# Patient Record
Sex: MALE | Race: WHITE | Employment: OTHER | URBAN - METROPOLITAN AREA
[De-identification: names, ages, dates, MRNs, and addresses within clinical notes are randomized per-mention and may not be internally consistent; named-entity substitution may affect disease eponyms.]

---

## 2017-02-02 RX ORDER — SIMVASTATIN 40 MG
TABLET ORAL
Qty: 90 TABLET | Refills: 3 | Status: SHIPPED | OUTPATIENT
Start: 2017-02-02 | End: 2017-10-17 | Stop reason: ALTCHOICE

## 2017-02-02 RX ORDER — LISINOPRIL 5 MG/1
TABLET ORAL
Qty: 90 TABLET | Refills: 3 | Status: SHIPPED | OUTPATIENT
Start: 2017-02-02 | End: 2017-10-17 | Stop reason: DRUGHIGH

## 2017-02-06 ENCOUNTER — OFFICE VISIT (OUTPATIENT)
Dept: INTERNAL MEDICINE CLINIC | Age: 57
End: 2017-02-06

## 2017-02-06 VITALS
HEART RATE: 68 BPM | WEIGHT: 208 LBS | DIASTOLIC BLOOD PRESSURE: 66 MMHG | BODY MASS INDEX: 30.81 KG/M2 | SYSTOLIC BLOOD PRESSURE: 120 MMHG | HEIGHT: 69 IN | RESPIRATION RATE: 12 BRPM

## 2017-02-06 DIAGNOSIS — N52.9 ERECTILE DYSFUNCTION, UNSPECIFIED ERECTILE DYSFUNCTION TYPE: ICD-10-CM

## 2017-02-06 DIAGNOSIS — I10 BENIGN ESSENTIAL HTN: Primary | ICD-10-CM

## 2017-02-06 DIAGNOSIS — R73.01 IMPAIRED FASTING GLUCOSE: ICD-10-CM

## 2017-02-06 DIAGNOSIS — Z23 FLU VACCINE NEED: ICD-10-CM

## 2017-02-06 DIAGNOSIS — I87.2 VENOUS INSUFFICIENCY OF BOTH LOWER EXTREMITIES: ICD-10-CM

## 2017-02-06 DIAGNOSIS — E78.5 HYPERLIPIDEMIA, UNSPECIFIED HYPERLIPIDEMIA TYPE: ICD-10-CM

## 2017-02-06 LAB
A/G RATIO: 1.7 (ref 1.1–2.2)
ALBUMIN SERPL-MCNC: 4.6 G/DL (ref 3.4–5)
ALP BLD-CCNC: 55 U/L (ref 40–129)
ALT SERPL-CCNC: 18 U/L (ref 10–40)
ANION GAP SERPL CALCULATED.3IONS-SCNC: 13 MMOL/L (ref 3–16)
AST SERPL-CCNC: 16 U/L (ref 15–37)
BILIRUB SERPL-MCNC: <0.2 MG/DL (ref 0–1)
BUN BLDV-MCNC: 27 MG/DL (ref 7–20)
CALCIUM SERPL-MCNC: 9.4 MG/DL (ref 8.3–10.6)
CHLORIDE BLD-SCNC: 102 MMOL/L (ref 99–110)
CHOLESTEROL, TOTAL: 168 MG/DL (ref 0–199)
CO2: 27 MMOL/L (ref 21–32)
CREAT SERPL-MCNC: 1 MG/DL (ref 0.9–1.3)
GFR AFRICAN AMERICAN: >60
GFR NON-AFRICAN AMERICAN: >60
GLOBULIN: 2.7 G/DL
GLUCOSE BLD-MCNC: 76 MG/DL (ref 70–99)
HDLC SERPL-MCNC: 42 MG/DL (ref 40–60)
LDL CHOLESTEROL CALCULATED: ABNORMAL MG/DL
LDL CHOLESTEROL DIRECT: 94 MG/DL
POTASSIUM SERPL-SCNC: 4.6 MMOL/L (ref 3.5–5.1)
SODIUM BLD-SCNC: 142 MMOL/L (ref 136–145)
TOTAL PROTEIN: 7.3 G/DL (ref 6.4–8.2)
TRIGL SERPL-MCNC: 310 MG/DL (ref 0–150)
VLDLC SERPL CALC-MCNC: ABNORMAL MG/DL

## 2017-02-06 PROCEDURE — 99214 OFFICE O/P EST MOD 30 MIN: CPT | Performed by: INTERNAL MEDICINE

## 2017-02-06 PROCEDURE — 90686 IIV4 VACC NO PRSV 0.5 ML IM: CPT | Performed by: INTERNAL MEDICINE

## 2017-02-06 PROCEDURE — 90471 IMMUNIZATION ADMIN: CPT | Performed by: INTERNAL MEDICINE

## 2017-02-06 RX ORDER — TADALAFIL 20 MG/1
TABLET ORAL
Qty: 5 TABLET | Refills: 5 | Status: SHIPPED | OUTPATIENT
Start: 2017-02-06 | End: 2017-09-13 | Stop reason: SDUPTHER

## 2017-02-07 LAB
ESTIMATED AVERAGE GLUCOSE: 116.9 MG/DL
HBA1C MFR BLD: 5.7 %

## 2017-02-10 ENCOUNTER — TELEPHONE (OUTPATIENT)
Dept: INTERNAL MEDICINE CLINIC | Age: 57
End: 2017-02-10

## 2017-09-13 ENCOUNTER — OFFICE VISIT (OUTPATIENT)
Dept: INTERNAL MEDICINE CLINIC | Age: 57
End: 2017-09-13

## 2017-09-13 VITALS
BODY MASS INDEX: 29.2 KG/M2 | SYSTOLIC BLOOD PRESSURE: 129 MMHG | HEIGHT: 70 IN | DIASTOLIC BLOOD PRESSURE: 74 MMHG | WEIGHT: 204 LBS | RESPIRATION RATE: 12 BRPM | HEART RATE: 49 BPM

## 2017-09-13 DIAGNOSIS — Z12.5 SCREENING FOR PROSTATE CANCER: ICD-10-CM

## 2017-09-13 DIAGNOSIS — M79.672 PAIN OF LEFT HEEL: ICD-10-CM

## 2017-09-13 DIAGNOSIS — S90.32XA CONTUSION OF LEFT HEEL, INITIAL ENCOUNTER: ICD-10-CM

## 2017-09-13 DIAGNOSIS — Z23 FLU VACCINE NEED: ICD-10-CM

## 2017-09-13 DIAGNOSIS — N52.9 ERECTILE DYSFUNCTION, UNSPECIFIED ERECTILE DYSFUNCTION TYPE: ICD-10-CM

## 2017-09-13 DIAGNOSIS — I45.10 RBBB (RIGHT BUNDLE BRANCH BLOCK): ICD-10-CM

## 2017-09-13 DIAGNOSIS — Z00.00 ANNUAL PHYSICAL EXAM: Primary | ICD-10-CM

## 2017-09-13 DIAGNOSIS — R00.1 SINUS BRADYCARDIA: ICD-10-CM

## 2017-09-13 LAB
A/G RATIO: 1.6 (ref 1.1–2.2)
ALBUMIN SERPL-MCNC: 4.6 G/DL (ref 3.4–5)
ALP BLD-CCNC: 51 U/L (ref 40–129)
ALT SERPL-CCNC: 19 U/L (ref 10–40)
ANION GAP SERPL CALCULATED.3IONS-SCNC: 14 MMOL/L (ref 3–16)
AST SERPL-CCNC: 17 U/L (ref 15–37)
BASOPHILS ABSOLUTE: 0.1 K/UL (ref 0–0.2)
BASOPHILS RELATIVE PERCENT: 1.9 %
BILIRUB SERPL-MCNC: 0.4 MG/DL (ref 0–1)
BILIRUBIN, POC: NORMAL
BLOOD URINE, POC: NORMAL
BUN BLDV-MCNC: 27 MG/DL (ref 7–20)
CALCIUM SERPL-MCNC: 9.3 MG/DL (ref 8.3–10.6)
CHLORIDE BLD-SCNC: 100 MMOL/L (ref 99–110)
CHOLESTEROL, TOTAL: 188 MG/DL (ref 0–199)
CLARITY, POC: CLEAR
CO2: 26 MMOL/L (ref 21–32)
COLOR, POC: YELLOW
CREAT SERPL-MCNC: 0.9 MG/DL (ref 0.9–1.3)
EOSINOPHILS ABSOLUTE: 0.5 K/UL (ref 0–0.6)
EOSINOPHILS RELATIVE PERCENT: 10.5 %
GFR AFRICAN AMERICAN: >60
GFR NON-AFRICAN AMERICAN: >60
GLOBULIN: 2.9 G/DL
GLUCOSE BLD-MCNC: 96 MG/DL (ref 70–99)
GLUCOSE URINE, POC: NORMAL
HCT VFR BLD CALC: 44.2 % (ref 40.5–52.5)
HDLC SERPL-MCNC: 48 MG/DL (ref 40–60)
HEMOGLOBIN: 14.7 G/DL (ref 13.5–17.5)
KETONES, POC: NORMAL
LDL CHOLESTEROL CALCULATED: 113 MG/DL
LEUKOCYTE EST, POC: NORMAL
LYMPHOCYTES ABSOLUTE: 2.1 K/UL (ref 1–5.1)
LYMPHOCYTES RELATIVE PERCENT: 41.1 %
MCH RBC QN AUTO: 32.1 PG (ref 26–34)
MCHC RBC AUTO-ENTMCNC: 33.4 G/DL (ref 31–36)
MCV RBC AUTO: 96.2 FL (ref 80–100)
MONOCYTES ABSOLUTE: 0.4 K/UL (ref 0–1.3)
MONOCYTES RELATIVE PERCENT: 8.2 %
NEUTROPHILS ABSOLUTE: 2 K/UL (ref 1.7–7.7)
NEUTROPHILS RELATIVE PERCENT: 38.3 %
NITRITE, POC: NORMAL
PDW BLD-RTO: 12.6 % (ref 12.4–15.4)
PH, POC: 5
PLATELET # BLD: 260 K/UL (ref 135–450)
PMV BLD AUTO: 7.6 FL (ref 5–10.5)
POTASSIUM SERPL-SCNC: 5.1 MMOL/L (ref 3.5–5.1)
PROSTATE SPECIFIC ANTIGEN: 0.85 NG/ML (ref 0–4)
PROTEIN, POC: NORMAL
RBC # BLD: 4.59 M/UL (ref 4.2–5.9)
SODIUM BLD-SCNC: 140 MMOL/L (ref 136–145)
SPECIFIC GRAVITY, POC: 1.01
TOTAL PROTEIN: 7.5 G/DL (ref 6.4–8.2)
TRIGL SERPL-MCNC: 137 MG/DL (ref 0–150)
TSH SERPL DL<=0.05 MIU/L-ACNC: 2.07 UIU/ML (ref 0.27–4.2)
UROBILINOGEN, POC: 0.2
VLDLC SERPL CALC-MCNC: 27 MG/DL
WBC # BLD: 5.1 K/UL (ref 4–11)

## 2017-09-13 PROCEDURE — 99396 PREV VISIT EST AGE 40-64: CPT | Performed by: INTERNAL MEDICINE

## 2017-09-13 PROCEDURE — 81002 URINALYSIS NONAUTO W/O SCOPE: CPT | Performed by: INTERNAL MEDICINE

## 2017-09-13 PROCEDURE — 90471 IMMUNIZATION ADMIN: CPT | Performed by: INTERNAL MEDICINE

## 2017-09-13 PROCEDURE — 93000 ELECTROCARDIOGRAM COMPLETE: CPT | Performed by: INTERNAL MEDICINE

## 2017-09-13 PROCEDURE — 90686 IIV4 VACC NO PRSV 0.5 ML IM: CPT | Performed by: INTERNAL MEDICINE

## 2017-09-13 RX ORDER — TADALAFIL 20 MG/1
TABLET ORAL
Qty: 5 TABLET | Refills: 5 | Status: SHIPPED | OUTPATIENT
Start: 2017-09-13 | End: 2017-10-17

## 2017-09-13 RX ORDER — MELOXICAM 15 MG/1
15 TABLET ORAL
Qty: 30 TABLET | Refills: 0 | Status: SHIPPED | OUTPATIENT
Start: 2017-09-13 | End: 2017-10-17 | Stop reason: ALTCHOICE

## 2017-09-22 ENCOUNTER — HOSPITAL ENCOUNTER (OUTPATIENT)
Dept: OTHER | Age: 57
Discharge: HOME | End: 2017-09-22
Attending: INTERNAL MEDICINE | Admitting: INTERNAL MEDICINE

## 2017-09-22 DIAGNOSIS — M79.672 PAIN OF LEFT HEEL: ICD-10-CM

## 2017-09-22 DIAGNOSIS — S90.32XA CONTUSION OF LEFT HEEL, INITIAL ENCOUNTER: ICD-10-CM

## 2017-09-28 ENCOUNTER — TELEPHONE (OUTPATIENT)
Dept: INTERNAL MEDICINE CLINIC | Age: 57
End: 2017-09-28

## 2017-10-03 ENCOUNTER — OFFICE VISIT (OUTPATIENT)
Dept: INTERNAL MEDICINE CLINIC | Age: 57
End: 2017-10-03

## 2017-10-03 ENCOUNTER — HOSPITAL ENCOUNTER (OUTPATIENT)
Dept: CT IMAGING | Age: 57
Discharge: HOME | End: 2017-10-03
Attending: INTERNAL MEDICINE | Admitting: INTERNAL MEDICINE

## 2017-10-03 ENCOUNTER — TELEPHONE (OUTPATIENT)
Dept: INTERNAL MEDICINE CLINIC | Age: 57
End: 2017-10-03

## 2017-10-03 VITALS — DIASTOLIC BLOOD PRESSURE: 76 MMHG | SYSTOLIC BLOOD PRESSURE: 137 MMHG | HEART RATE: 62 BPM

## 2017-10-03 DIAGNOSIS — R51.9 HEADACHE: ICD-10-CM

## 2017-10-03 DIAGNOSIS — R51.9 NEW ONSET HEADACHE: ICD-10-CM

## 2017-10-03 DIAGNOSIS — R51.9 NEW ONSET HEADACHE: Primary | ICD-10-CM

## 2017-10-03 PROCEDURE — 99214 OFFICE O/P EST MOD 30 MIN: CPT | Performed by: INTERNAL MEDICINE

## 2017-10-03 NOTE — MR AVS SNAPSHOT
· Do not ignore new symptoms that occur with a headache, such as a fever, weakness or numbness, vision changes, or confusion. These may be signs of a more serious problem. To prevent headaches  · Keep a headache diary so you can figure out what triggers your headaches. Avoiding triggers may help you prevent headaches. Record when each headache began, how long it lasted, and what the pain was like (throbbing, aching, stabbing, or dull). Write down any other symptoms you had with the headache, such as nausea, flashing lights or dark spots, or sensitivity to bright light or loud noise. Note if the headache occurred near your period. List anything that might have triggered the headache, such as certain foods (chocolate, cheese, wine) or odors, smoke, bright light, stress, or lack of sleep. · Find healthy ways to deal with stress. Headaches are most common during or right after stressful times. Take time to relax before and after you do something that has caused a headache in the past.  · Try to keep your muscles relaxed by keeping good posture. Check your jaw, face, neck, and shoulder muscles for tension, and try relaxing them. When sitting at a desk, change positions often, and stretch for 30 seconds each hour. · Get plenty of sleep and exercise. · Eat regularly and well. Long periods without food can trigger a headache. · Treat yourself to a massage. Some people find that regular massages are very helpful in relieving tension. · Limit caffeine by not drinking too much coffee, tea, or soda. But don't quit caffeine suddenly, because that can also give you headaches. · Reduce eyestrain from computers by blinking frequently and looking away from the computer screen every so often. Make sure you have proper eyewear and that your monitor is set up properly, about an arm's length away. · Seek help if you have depression or anxiety.  Your headaches may be linked to these conditions. Treatment can both prevent headaches and help with symptoms of anxiety or depression. When should you call for help? Call 911 anytime you think you may need emergency care. For example, call if:  · You have signs of a stroke. These may include:  ¨ Sudden numbness, paralysis, or weakness in your face, arm, or leg, especially on only one side of your body. ¨ Sudden vision changes. ¨ Sudden trouble speaking. ¨ Sudden confusion or trouble understanding simple statements. ¨ Sudden problems with walking or balance. ¨ A sudden, severe headache that is different from past headaches. Call your doctor now or seek immediate medical care if:  · You have a new or worse headache. · Your headache gets much worse. Where can you learn more? Go to https://[employer] Lasertron[employer] Corporation.Qustodian. org and sign in to your Night Out account. Enter 1367 7488 in the EnCoate box to learn more about \"Headache: Care Instructions. \"     If you do not have an account, please click on the \"Sign Up Now\" link. Current as of: October 14, 2016  Content Version: 11.3  © 9382-6219 uGift. Care instructions adapted under license by [facility] ([facility]). If you have questions about a medical condition or this instruction, always ask your healthcare professional. [address] any warranty or liability for your use of this information.               Medications and Orders      Your Current Medications Are              tadalafil (CIALIS) 20 MG tablet Take 1/2 tab by mouth when necessary    meloxicam (MOBIC) 15 MG tablet Take 1 tablet by mouth daily (with breakfast)    lisinopril (PRINIVIL;ZESTRIL) 5 MG tablet TAKE 1 TABLET DAILY    simvastatin (ZOCOR) 40 MG tablet TAKE 1 TABLET EVERY EVENING    Cetirizine HCl (ZYRTEC ALLERGY PO)   Take 10 mg by mouth daily       Allergies              Amoxicillin Nausea Only    Codeine Nausea Only      We Ordered/Performed the following

## 2017-10-03 NOTE — PROGRESS NOTES
[facility]) Physicians  Internal Medicine  Patient Encounter  Leon Richard D.O., [facility]        Chief Complaint   Patient presents with    Headache       HPI: 60 y.o. male Seen today with complaint of headache. Symptoms started while on his boat 2 days ago in [place]. He had been on the boat for 4-5 hours fishing. Pt reports developing an acute severe headache. He described a sharp pain that was unbearable. He reports this was the worst HA of his life. Given his family history of cerebral aneurysm (Father  of ruptured cerebral aneurysm) he was nervous. He felt like his Samba.melupis Lob was going to explode. \"  He thought he was going to dye. He then developed a feeling like he was going to pass out. His arms were tingling. The sun light made his HA worse. His friend drove boat in to the port. He had a Coca Cola. He then started vomiting. He still had the HA. He went back to his hotel and took Tylenol. The HA eased, and continued to be dull. He almost went to the ER. He continues to have a dull HA today that worsens with coughing or bending forward. He had a CTA of his head 2015 which showed no evidence of cerebral aneurysm. He also has had an MRA in the remote past-- no aneurysms. He denies H/O Migraines. He had been drinking the day before. He did not have vertigo. No syncope. He denies fever or chills. His friend was with him the whole time. Past Medical History:   Diagnosis Date    Carotid atherosclerosis     Colon polyps     Dilated aortic root (HCC)     HTN (hypertension)     Hyperlipidemia     Impaired fasting glucose     Mitral regurgitation     Psoriasis     RBBB (right bundle branch block)     Varicose veins     Venous insufficiency     Venous reflux     Saphenofemoral       Review of Systems - As per HPI  GEN: Pt denies fever, chills or night sweats. Denies weight changes.   Appetite good  HEENT: Pt denies visual and auditory changes, epistaxis, upper respiratory symptoms and dysphagia  CV: Pt denies CP, SOB, HUERTAS, orthopnea palpitations, LE swelling, and claudication. PULM: Pt denies cough, wheezing or sputum production  GI: Pt denies N/V/D, heart burn, rectal bleeding, or melena. NEURO: Pt denies unilateral weakness, paresthesia and dizziness. OBJECTIVE:  /76  Pulse 62  GEN: NAD, A&O, Non-toxic  HEENT: NC/AT, KRIS, EOMI, Oral cavity Clear,  TM's NL without hemotympanum, Nasal cavity clear without discharge. Funduscopic exam difficult but no obvious abnormalities particularly papilledema. NECK: Supple. No thyromegaly. No nuchal regidity  LYMPH: No C/SC nodes. CV: S1 S2 NL, RRR. No murmurs, clicks or rubs. PULM: CTA, symmentric air exchange  EXT: No C/C/E  GI: Abdomen is soft, NT, BS+, No hepatomegaly. NEURO: No focal or lateralizing deficits. No meningismus. (-) Kernig's, (-) Brudzinski's. No focal deficits. VASC:  No carotid bruits. Pulses symmetric    ASSESSMENT[de-identified]  Fernando Uribe was seen today for headache. Diagnoses and all orders for this visit:    New onset headache  -     CT Head WO Contrast      R/O Bleed  Differential considerations-- Migraine, dehydration, hypotension, HTN    Additional Plan:  1. Fluids  2. Tylenol or Advil for headache        Discussed medications with patient who voiced understanding of their use, indication and potential side effects. Pt also understands the above recommendations. All questions answered.

## 2017-10-03 NOTE — PATIENT INSTRUCTIONS
triggers your headaches. Avoiding triggers may help you prevent headaches. Record when each headache began, how long it lasted, and what the pain was like (throbbing, aching, stabbing, or dull). Write down any other symptoms you had with the headache, such as nausea, flashing lights or dark spots, or sensitivity to bright light or loud noise. Note if the headache occurred near your period. List anything that might have triggered the headache, such as certain foods (chocolate, cheese, wine) or odors, smoke, bright light, stress, or lack of sleep. · Find healthy ways to deal with stress. Headaches are most common during or right after stressful times. Take time to relax before and after you do something that has caused a headache in the past.  · Try to keep your muscles relaxed by keeping good posture. Check your jaw, face, neck, and shoulder muscles for tension, and try relaxing them. When sitting at a desk, change positions often, and stretch for 30 seconds each hour. · Get plenty of sleep and exercise. · Eat regularly and well. Long periods without food can trigger a headache. · Treat yourself to a massage. Some people find that regular massages are very helpful in relieving tension. · Limit caffeine by not drinking too much coffee, tea, or soda. But don't quit caffeine suddenly, because that can also give you headaches. · Reduce eyestrain from computers by blinking frequently and looking away from the computer screen every so often. Make sure you have proper eyewear and that your monitor is set up properly, about an arm's length away. · Seek help if you have depression or anxiety. Your headaches may be linked to these conditions. Treatment can both prevent headaches and help with symptoms of anxiety or depression. When should you call for help? Call 911 anytime you think you may need emergency care. For example, call if:  · You have signs of a stroke.  These may include:  ¨ Sudden numbness, paralysis, or weakness in your face, arm, or leg, especially on only one side of your body. ¨ Sudden vision changes. ¨ Sudden trouble speaking. ¨ Sudden confusion or trouble understanding simple statements. ¨ Sudden problems with walking or balance. ¨ A sudden, severe headache that is different from past headaches. Call your doctor now or seek immediate medical care if:  · You have a new or worse headache. · Your headache gets much worse. Where can you learn more? Go to https://Authentic8peVenuCare Medical.Resonant Sensors Inc.. org and sign in to your Trigemina account. Enter [id number] [id number] in the Ayrstone Productivity box to learn more about \"Headache: Care Instructions. \"     If you do not have an account, please click on the \"Sign Up Now\" link. Current as of: October 14, 2016  Content Version: 11.3  © 2382-4103 HealthRally, Incorporated. Care instructions adapted under license by [facility] ([facility]). If you have questions about a medical condition or this instruction, always ask your healthcare professional. [place] [address] any warranty or liability for your use of this information.

## 2017-10-12 ENCOUNTER — TELEPHONE (OUTPATIENT)
Dept: INTERNAL MEDICINE CLINIC | Age: 57
End: 2017-10-12

## 2017-10-12 NOTE — TELEPHONE ENCOUNTER
Suhail Ortez is pt's girfriend,she stated that pt was recently discharged from hospital for brain bleed. She stated that last night pt was not exerting himself but his b/p was up to 190/111 and he was complaining of headaches. She stated that she spoke to doctor on call  Dr Nina Voss. She advised pt to take extra Lisinopril 5 mg ,pt had taken his regular dose of 10 mg. She took his b/p this mrning about 10 mins ago and his b/p was 146/78. She did speak to the Neurologist and he told her to continue monitoring pt's b/p ot bring pt to ER for further evaluation. She wants to bring pt here for a appt. She is aware that Dr Shanika Guidry is out of the office. Please advise.

## 2017-10-12 NOTE — TELEPHONE ENCOUNTER
Ok to schedule follow up next week with Dr Brittany Parra for BP monitoring ,or if another MD appt available and family prefers can be seen tomorrow. . If pt is having recurrent HA, should go to ER.

## 2017-10-16 ENCOUNTER — TELEPHONE (OUTPATIENT)
Dept: INTERNAL MEDICINE CLINIC | Age: 57
End: 2017-10-16

## 2017-10-16 NOTE — TELEPHONE ENCOUNTER
He never went to ER when he was told to do so. If he is doing OK now, he can wait until visit. Go to ER if symptoms return.

## 2017-10-16 NOTE — TELEPHONE ENCOUNTER
Per pt: Saturday and Sunday pt bent over x2 and upon standing her felt a tingle and numbness in his left arm for about 5 minuets. He states that each episode left him feeling weak for about 2 hrs. Pt checked BP and his average is 148/82. PC to Dr Colton Mehta last week when pt bp went to 190 and pt was advised to take an extra 5mg Lisinopril. Pt dosage is 10mg Lisinopril per hospital  Pt denies any head pain or headache today.  He is scheduled for an office visit tomorrow

## 2017-10-17 ENCOUNTER — OFFICE VISIT (OUTPATIENT)
Dept: INTERNAL MEDICINE CLINIC | Age: 57
End: 2017-10-17

## 2017-10-17 ENCOUNTER — TELEPHONE (OUTPATIENT)
Dept: INTERNAL MEDICINE CLINIC | Age: 57
End: 2017-10-17

## 2017-10-17 VITALS
BODY MASS INDEX: 27.75 KG/M2 | WEIGHT: 199 LBS | DIASTOLIC BLOOD PRESSURE: 70 MMHG | SYSTOLIC BLOOD PRESSURE: 120 MMHG | HEART RATE: 66 BPM

## 2017-10-17 DIAGNOSIS — I10 BENIGN ESSENTIAL HTN: ICD-10-CM

## 2017-10-17 DIAGNOSIS — I60.9 SAH (SUBARACHNOID HEMORRHAGE) (HCC): Primary | ICD-10-CM

## 2017-10-17 DIAGNOSIS — F41.9 ANXIETY: ICD-10-CM

## 2017-10-17 DIAGNOSIS — R45.87 POOR IMPULSE CONTROL: ICD-10-CM

## 2017-10-17 DIAGNOSIS — E78.5 HYPERLIPIDEMIA, UNSPECIFIED HYPERLIPIDEMIA TYPE: ICD-10-CM

## 2017-10-17 PROCEDURE — 99214 OFFICE O/P EST MOD 30 MIN: CPT | Performed by: INTERNAL MEDICINE

## 2017-10-17 RX ORDER — ATORVASTATIN CALCIUM 20 MG/1
20 TABLET, FILM COATED ORAL DAILY
Qty: 30 TABLET | Refills: 0 | Status: SHIPPED | OUTPATIENT
Start: 2017-10-17 | End: 2018-09-24 | Stop reason: SDUPTHER

## 2017-10-17 RX ORDER — LISINOPRIL 10 MG/1
10 TABLET ORAL DAILY
Qty: 90 TABLET | Refills: 3 | Status: SHIPPED | OUTPATIENT
Start: 2017-10-17 | End: 2018-09-24 | Stop reason: SDUPTHER

## 2017-10-17 RX ORDER — LISINOPRIL 10 MG/1
10 TABLET ORAL DAILY
COMMUNITY
End: 2017-10-17 | Stop reason: SDUPTHER

## 2017-10-17 RX ORDER — ATORVASTATIN CALCIUM 20 MG/1
20 TABLET, FILM COATED ORAL DAILY
Qty: 90 TABLET | Refills: 3 | Status: SHIPPED | OUTPATIENT
Start: 2017-10-17 | End: 2017-10-17 | Stop reason: SDUPTHER

## 2017-10-17 NOTE — PATIENT INSTRUCTIONS
group for people with similar problems. Talking to others sometimes relieves stress. · Get involved in social groups, or volunteer to help others. Being alone sometimes makes things seem worse than they are. · Get at least 30 minutes of exercise on most days of the week to relieve stress. Walking is a good choice. You also may want to do other activities, such as running, swimming, cycling, or playing tennis or team sports. Relaxation techniques  Do relaxation exercises 10 to 20 minutes a day. You can play soothing, relaxing music while you do them, if you wish. · Tell others in your house that you are going to do your relaxation exercises. Ask them not to disturb you. · Find a comfortable place, away from all distractions and noise. · Lie down on your back, or sit with your back straight. · Focus on your breathing. Make it slow and steady. · Breathe in through your nose. Breathe out through either your nose or mouth. · Breathe deeply, filling up the area between your navel and your rib cage. Breathe so that your belly goes up and down. · Do not hold your breath. · Breathe like this for 5 to 10 minutes. Notice the feeling of calmness throughout your whole body. As you continue to breathe slowly and deeply, relax by doing the following for another 5 to 10 minutes:  · Tighten and relax each muscle group in your body. You can begin at your toes and work your way up to your head. · Imagine your muscle groups relaxing and becoming heavy. · Empty your mind of all thoughts. · Let yourself relax more and more deeply. · Become aware of the state of calmness that surrounds you. · When your relaxation time is over, you can bring yourself back to alertness by moving your fingers and toes and then your hands and feet and then stretching and moving your entire body. Sometimes people fall asleep during relaxation, but they usually wake up shortly afterward.   · Always give yourself time to return to full alertness before you drive a car or do anything that might cause an accident if you are not fully alert. Never play a relaxation tape while you drive a car. When should you call for help? Call 911 anytime you think you may need emergency care. For example, call if:  · You feel you cannot stop from hurting yourself or someone else. Keep the numbers for these national suicide hotlines: 8-290-692-TALK (1-934.304.9729) and 3-446-XRFKHMD (0-686.629.3426). If you or someone you know talks about suicide or feeling hopeless, get help right away. Watch closely for changes in your health, and be sure to contact your doctor if:  · You have anxiety or fear that affects your life. · You have symptoms of anxiety that are new or different from those you had before. Where can you learn more? Go to https://App[employer] Ltd.Flipora. org and sign in to your J[employer]J Solutions account. Enter P754 in the BrainLAB box to learn more about \"Anxiety Disorder: Care Instructions. \"     If you do not have an account, please click on the \"Sign Up Now\" link. Current as of: July 26, 2016  Content Version: 11.3  © 3914-4866 From The Bench. Care instructions adapted under license by [facility] [address] If you have questions about a medical condition or this instruction, always ask your healthcare professional. [place] [address] any warranty or liability for your use of this information. Patient Education        Learning About Anxiety Disorders  What are anxiety disorders? Anxiety disorders are a type of medical problem. They cause severe anxiety. When you feel anxious, you feel that something bad is about to happen. This feeling interferes with your life. These disorders include:  · Generalized anxiety disorder. You feel worried and stressed about many everyday events and activities. This goes on for several months and disrupts your life on most days. · Panic disorder. You have repeated panic attacks.  A panic be used. Medicines may include:  · Antidepressants. These may help your symptoms by keeping chemicals in your brain in balance. · Benzodiazepines. These may give you short-term relief of your symptoms. Some people use cognitive-behavioral therapy. A therapist helps you learn to change stressful or bad thoughts into helpful thoughts. Lead a healthy lifestyle  A healthy lifestyle may help you feel better. · Get at least 30 minutes of exercise on most days of the week. Walking is a good choice. · Eat a healthy diet. Include fruits, vegetables, lean proteins, and whole grains in your diet each day. · Try to go to bed at the same time every night. Try for 8 hours of sleep a night. · Find ways to manage stress. Try relaxation exercises. · Avoid alcohol and illegal drugs. Follow-up care is a key part of your treatment and safety. Be sure to make and go to all appointments, and call your doctor if you are having problems. It's also a good idea to know your test results and keep a list of the medicines you take. Where can you learn more? Go to https://RUNform.BitPoster. org and sign in to your MOAEC account. Enter I097 in the [facility] box to learn more about \"Learning About Anxiety Disorders. \"     If you do not have an account, please click on the \"Sign Up Now\" link. Current as of: May 3, 2017  Content Version: 11.3  © 9732-2020 CreationFlow, Incorporated. Care instructions adapted under license by [facility] ([facility]). If you have questions about a medical condition or this instruction, always ask your healthcare professional. [place] [address] any warranty or liability for your use of this information. Patient Education        Learning About High Cholesterol  What is high cholesterol? Cholesterol is a type of fat in your blood. It is needed for many body functions, such as making new cells. Cholesterol is made by your body. It also comes from food you eat.   If you have too much cholesterol, it starts to build up in your arteries. This is called hardening of the arteries, or atherosclerosis. High cholesterol raises your risk of a heart attack and stroke. There are different types of cholesterol. LDL is the \"bad\" cholesterol. High LDL can raise your risk for heart disease, heart attack, and stroke. HDL is the \"good\" cholesterol. High HDL is linked with a lower risk for heart disease, heart attack, and stroke. Your cholesterol levels help your doctor find out your risk for having a heart attack or stroke. How can you prevent high cholesterol? A heart-healthy lifestyle can help you prevent high cholesterol. This lifestyle helps lower your risk for a heart attack and stroke. · Eat heart-healthy foods. ¨ Eat fruits, vegetables, whole grains (like oatmeal), dried beans and peas, nuts and seeds, soy products (like tofu), and fat-free or low-fat dairy products. ¨ Replace butter, margarine, and hydrogenated or partially hydrogenated oils with olive and canola oils. (Canola oil margarine without trans fat is fine.)  ¨ Replace red meat with fish, poultry, and soy protein (like tofu). ¨ Limit processed and packaged foods like chips, crackers, and cookies. · Be active. Exercise can improve your cholesterol level. Get at least 30 minutes of exercise on most days of the week. Walking is a good choice. You also may want to do other activities, such as running, swimming, cycling, or playing tennis or team sports. · Stay at a healthy weight. Lose weight if you need to. · Don't smoke. If you need help quitting, talk to your doctor about stop-smoking programs and medicines. These can increase your chances of quitting for good. How is high cholesterol treated? The goal of treatment is to reduce your chances of having a heart attack or stroke. The goal is not to lower your cholesterol numbers only.   · You may make lifestyle changes, such as eating healthy foods, not smoking, losing weight, and being more active. · You may have to take medicine. Follow-up care is a key part of your treatment and safety. Be sure to make and go to all appointments, and call your doctor if you are having problems. It's also a good idea to know your test results and keep a list of the medicines you take. Where can you learn more? Go to https://chpepiceweb.Yagomart. org and sign in to your [employer] account. Enter A608 in the Malwa International box to learn more about \"Learning About High Cholesterol. \"     If you do not have an account, please click on the \"Sign Up Now\" link. Current as of: April 3, 2017  Content Version: 11.3  © 9173-2727 VoiceGem, A[employer]ent Group. Care instructions adapted under license by [facility] ([facility]). If you have questions about a medical condition or this instruction, always ask your healthcare professional. [address] any warranty or liability for your use of this information. Patient Education        Learning About High Blood Pressure  What is high blood pressure? Blood pressure is a measure of how hard the blood pushes against the walls of your arteries. It's normal for blood pressure to go up and down throughout the day, but if it stays up, you have high blood pressure. Another name for high blood pressure is hypertension. Two numbers tell you your blood pressure. The first number is the systolic pressure. It shows how hard the blood pushes when your heart is pumping. The second number is the diastolic pressure. It shows how hard the blood pushes between heartbeats, when your heart is relaxed and filling with blood. A blood pressure of less than 120/80 (say \"120 over 80\") is ideal for an adult. High blood pressure is 140/90 or higher. You have high blood pressure if your top number is 140 or higher or your bottom number is 90 or higher, or both. Many people fall into the category in between, called prehypertension.  People with prehypertension need
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
## 2017-10-18 ENCOUNTER — OFFICE VISIT (OUTPATIENT)
Dept: PSYCHOLOGY | Age: 57
End: 2017-10-18

## 2017-10-18 DIAGNOSIS — F63.81 INTERMITTENT EXPLOSIVE DISORDER IN ADULT: Primary | ICD-10-CM

## 2017-10-18 PROCEDURE — 90791 PSYCH DIAGNOSTIC EVALUATION: CPT | Performed by: PSYCHOLOGIST

## 2017-10-18 ASSESSMENT — PATIENT HEALTH QUESTIONNAIRE - PHQ9
1. LITTLE INTEREST OR PLEASURE IN DOING THINGS: 0
SUM OF ALL RESPONSES TO PHQ QUESTIONS 1-9: 0
2. FEELING DOWN, DEPRESSED OR HOPELESS: 0
SUM OF ALL RESPONSES TO PHQ9 QUESTIONS 1 & 2: 0

## 2017-10-18 NOTE — PATIENT INSTRUCTIONS
1. When driving, work to see a different explanation of why someone did something that isn't personal (the odds the other  wasn't targeting you)  2. Challenge the belief that there are others ways to not be afraid that don't require violence/aggression  3. Work to find softer ways to defend yourself  4. [place] with new ways to correct the wrongs you see others do  5. Use the word \"Norma\" to remind you to pause and try a new approach when you get angry (let Pat Farah use this word to help you too)  6. To calm your body: diaphragmatic breathing (shorter inhale, longer exhale)  7. Keep going fishing as much as you can  8. Return in 2-3 weeks          Relaxation:  Diaphragmatic Breathing             ______________________________________________________________________________    1. Sit in a comfortable position    2. Place one hand on your stomach and the other on your chest    3. Try to breathe so that only your stomach rises and falls    As you inhale, concentrate on your chest remaining relatively still while your stomach rises. It may be helpful for you to imagine that your pants are too big and you need to push your stomach out to hold them up. When exhaling, allow your stomach to fall in and the air to fully escape. Inhale slowly. You may choose to hold the air in for about a second. Exhale slowly. Dont push the air out, but just let the natural pressure of your body slowly move it out. It is normal for this healthy method of breathing to feel a little awkward at first.  With practice, it will feel more natural.    4. Get your mind on your side    One other important factor in getting relaxed is your mind. Your mind and body are connected. The mind influences the body and the body influences the mind. What you do with your mind when you are trying to relax is very important. The key is to avoid thinking about stressful things.       You can think about      Neutral things (e.g., counting, saying a word like calm or relax)   Pleasant things (e.g., imagining a pleasant place)    5. It is recommended that you practice 2 times per day, 15 minutes each time.

## 2017-10-27 ENCOUNTER — TELEPHONE (OUTPATIENT)
Dept: INTERNAL MEDICINE CLINIC | Age: 57
End: 2017-10-27

## 2017-11-06 ENCOUNTER — OFFICE VISIT (OUTPATIENT)
Dept: PSYCHOLOGY | Age: 57
End: 2017-11-06

## 2017-11-06 DIAGNOSIS — F63.81 INTERMITTENT EXPLOSIVE DISORDER IN ADULT: Primary | ICD-10-CM

## 2017-11-06 PROCEDURE — 90832 PSYTX W PT 30 MINUTES: CPT | Performed by: PSYCHOLOGIST

## 2017-11-06 NOTE — PROGRESS NOTES
Behavioral Health Consultation  GEOVANI Justice,Ph.D.  Psychologist  11/6/2017  8:57 AM      Time spent with Patient: 30 minutes  This is patient's second  [facility] appointment. Reason for Consult:    Chief Complaint   Patient presents with    Agitation     Referring Provider: Bradford Phillip DO  [address], [address]      Feedback given to PCP. S:  Pt is doing better. Working on skills and thinking about things in a different way. Some edginess but breathing helps him not act out on it. Checked his blood pressure at home before and after he did the breathing and it did lower it. Pt reports girlfriend sees he's doing better. Thinking of his granddaughter helps him control himself. Seeing more how getting upset over these things isn't worth it. Talked to siblings about how they were brought up to fight others. O:  MSE:    Appearance    cooperative  Appetite normal  Sleep disturbance No  Fatigue No  Loss of pleasure No  Impulsive behavior Yes  Speech    normal rate  Mood    Irritability  Affect    normal affect  Thought Content    intact  Thought Process    coherent  Associations    logical connections  Insight    Good  Judgment    Intact  Orientation    oriented to person, place, time, and general circumstances  Memory    recent and remote memory intact  Attention/Concentration    intact  Morbid ideation No  Suicide Assessment    no suicidal ideation        A:  Pt is doing much better with his temper. Not perfect but using skills, has been adjusting his thinking. Even discussed it with his siblings. Pt feels confident can continue using skills for a more complete change.     Diagnosis:    Intermittent explosive disorder      Diagnosis Date    Carotid atherosclerosis     Colon polyps     Dilated aortic root (HCC)     HTN (hypertension)     Hyperlipidemia     Impaired fasting glucose     Mitral regurgitation     Psoriasis     RBBB (right bundle branch block)    

## 2017-11-17 ENCOUNTER — OFFICE VISIT (OUTPATIENT)
Dept: INTERNAL MEDICINE CLINIC | Age: 57
End: 2017-11-17

## 2017-11-17 VITALS
BODY MASS INDEX: 27.89 KG/M2 | WEIGHT: 200 LBS | RESPIRATION RATE: 12 BRPM | HEART RATE: 68 BPM | SYSTOLIC BLOOD PRESSURE: 130 MMHG | DIASTOLIC BLOOD PRESSURE: 80 MMHG

## 2017-11-17 DIAGNOSIS — I10 BENIGN ESSENTIAL HTN: Primary | ICD-10-CM

## 2017-11-17 DIAGNOSIS — F63.9 IMPULSE CONTROL DISORDER: ICD-10-CM

## 2017-11-17 DIAGNOSIS — Z86.79 HISTORY OF SUBARACHNOID HEMORRHAGE: ICD-10-CM

## 2017-11-17 DIAGNOSIS — S30.1XXA HEMATOMA OF GROIN, INITIAL ENCOUNTER: ICD-10-CM

## 2017-11-17 PROCEDURE — 99213 OFFICE O/P EST LOW 20 MIN: CPT | Performed by: INTERNAL MEDICINE

## 2017-11-17 NOTE — PROGRESS NOTES
[facility]) Physicians  Internal Medicine  Patient Encounter  Alma Cason D.O., [facility]        Chief Complaint   Patient presents with    Follow-up       HPI: 59 y.o. male who is scheduled to be seen for a follow-up regarding his recent stroke and blood pressure. Patient is requesting updated paperwork to extend his time off work until December 4, 2017. He was given strict instructions by his neurosurgical team who treated him for a subarachnoid hemorrhage. At his last visit with me we addressed his hypertension. We told him to continue lisinopril 10 mg daily. He needed to have his blood pressure rechecked. In addition we addressed his hyperlipidemia. His Lipitor was increased to 20 mg. We also addressed poor impulse control associated with anger spikes, anxiety, mood lability that we were able to relate back to his childhood. He was seen by the psychologist.  At his last visit he indicated he was doing much better with his temper but it was not perfect. He has enjoyed his visits with Dr. Stephanei Alford. He is using the strategies and techniques to help him \"calm down\" in multiple situations when he is getting upset. Since the last visit, he had another angiogram that was complicated by vessel dissection on the left. The vessel healed by pressure alone. The switched to the other side and used the clamp to stop bleeding. Test was 1 week ago. This time they were able to see an aneurysm behind right eye. He will have an MRA every 6 months. He met Dr. Kathleen Rodriguez when he was hospitalized. He will see him in follow up. F/U in January (1/11/2018). He still continues to have headaches intermittently. He has been released from Neurosurgery      He also has appointment with ID 12/11/2017. He was exposed to another patients tooth brush. He needs paperwork filled out.     Past Medical History:   Diagnosis Date    Carotid atherosclerosis     Colon polyps     Dilated aortic root (HCC)     HTN

## 2017-11-17 NOTE — PATIENT INSTRUCTIONS
blood pressure even more. ¨ Buy foods that are labeled \"unsalted,\" \"sodium-free,\" or \"low-sodium. \" Foods labeled \"reduced-sodium\" and \"light sodium\" may still have too much sodium. ¨ Flavor your food with garlic, lemon juice, onion, vinegar, herbs, and spices instead of salt. Do not use soy sauce, steak sauce, onion salt, garlic salt, mustard, or ketchup on your food. ¨ Use less salt (or none) when recipes call for it. You can often use half the salt a recipe calls for without losing flavor. · Be physically active. Get at least 30 minutes of exercise on most days of the week. Walking is a good choice. You also may want to do other activities, such as running, swimming, cycling, or playing tennis or team sports. · Limit alcohol to 2 drinks a day for men and 1 drink a day for women. · Eat plenty of fruits, vegetables, and low-fat dairy products. Eat less saturated and total fats. How is high blood pressure treated? · Your doctor will suggest making lifestyle changes. For example, your doctor may ask you to eat healthy foods, quit smoking, lose extra weight, and be more active. · If lifestyle changes don't help enough or your blood pressure is very high, you will have to take medicine every day. Follow-up care is a key part of your treatment and safety. Be sure to make and go to all appointments, and call your doctor if you are having problems. It's also a good idea to know your test results and keep a list of the medicines you take. Where can you learn more? Go to https://chpepiceweb.[email or web address]-lance.ru. org and sign in to your [employer] account. Enter P501 in the QuantiSense box to learn more about \"Learning About High Blood Pressure. \"     If you do not have an account, please click on the \"Sign Up Now\" link. Current as of: March 23, 2016  Content Version: 11.3  © 8773-0959 [employer]. Care instructions adapted under license by [facility] ([facility]).  If you have questions about a medical condition or this instruction, always ask your healthcare professional. [place] [address] any warranty or liability for your use of this information.

## 2017-11-20 ENCOUNTER — TELEPHONE (OUTPATIENT)
Dept: INTERNAL MEDICINE CLINIC | Age: 57
End: 2017-11-20

## 2017-11-20 NOTE — TELEPHONE ENCOUNTER
Disability paperwork completed and faxed to pts employer. pt will be by the office tomorrow AM to  his original paperwork

## 2017-11-21 ENCOUNTER — TELEPHONE (OUTPATIENT)
Dept: INTERNAL MEDICINE CLINIC | Age: 57
End: 2017-11-21

## 2017-11-21 NOTE — TELEPHONE ENCOUNTER
Aryan Weber stated that pt's Neurologist has listed pt as disabled til 12-4 . They are done with seeing pt. And are referring pt to his PCP . She wants to know how long is pt to be disabled and be off work ?   Also she is requesting a copy of [employer] forms to be faxed to her at [phone]

## 2018-02-20 ENCOUNTER — OFFICE VISIT (OUTPATIENT)
Dept: INTERNAL MEDICINE CLINIC | Age: 58
End: 2018-02-20

## 2018-02-20 VITALS
RESPIRATION RATE: 12 BRPM | HEART RATE: 56 BPM | BODY MASS INDEX: 28.35 KG/M2 | WEIGHT: 198 LBS | HEIGHT: 70 IN | SYSTOLIC BLOOD PRESSURE: 115 MMHG | DIASTOLIC BLOOD PRESSURE: 60 MMHG

## 2018-02-20 DIAGNOSIS — I10 BENIGN ESSENTIAL HTN: Primary | ICD-10-CM

## 2018-02-20 DIAGNOSIS — E78.5 HYPERLIPIDEMIA, UNSPECIFIED HYPERLIPIDEMIA TYPE: ICD-10-CM

## 2018-02-20 DIAGNOSIS — R73.01 IMPAIRED FASTING GLUCOSE: ICD-10-CM

## 2018-02-20 DIAGNOSIS — I87.2 VENOUS INSUFFICIENCY OF BOTH LOWER EXTREMITIES: ICD-10-CM

## 2018-02-20 DIAGNOSIS — E88.81 METABOLIC SYNDROME: ICD-10-CM

## 2018-02-20 LAB
A/G RATIO: 1.6 (ref 1.1–2.2)
ALBUMIN SERPL-MCNC: 4.4 G/DL (ref 3.4–5)
ALP BLD-CCNC: 50 U/L (ref 40–129)
ALT SERPL-CCNC: 17 U/L (ref 10–40)
ANION GAP SERPL CALCULATED.3IONS-SCNC: 13 MMOL/L (ref 3–16)
AST SERPL-CCNC: 18 U/L (ref 15–37)
BASOPHILS ABSOLUTE: 0.1 K/UL (ref 0–0.2)
BASOPHILS RELATIVE PERCENT: 1.4 %
BILIRUB SERPL-MCNC: 0.5 MG/DL (ref 0–1)
BUN BLDV-MCNC: 22 MG/DL (ref 7–20)
CALCIUM SERPL-MCNC: 9.1 MG/DL (ref 8.3–10.6)
CHLORIDE BLD-SCNC: 102 MMOL/L (ref 99–110)
CHOLESTEROL, TOTAL: 152 MG/DL (ref 0–199)
CO2: 27 MMOL/L (ref 21–32)
CREAT SERPL-MCNC: 1 MG/DL (ref 0.9–1.3)
EOSINOPHILS ABSOLUTE: 0.4 K/UL (ref 0–0.6)
EOSINOPHILS RELATIVE PERCENT: 8.6 %
GFR AFRICAN AMERICAN: >60
GFR NON-AFRICAN AMERICAN: >60
GLOBULIN: 2.7 G/DL
GLUCOSE BLD-MCNC: 89 MG/DL (ref 70–99)
HCT VFR BLD CALC: 43.6 % (ref 40.5–52.5)
HDLC SERPL-MCNC: 47 MG/DL (ref 40–60)
HEMOGLOBIN: 14.5 G/DL (ref 13.5–17.5)
LDL CHOLESTEROL CALCULATED: 86 MG/DL
LYMPHOCYTES ABSOLUTE: 1.9 K/UL (ref 1–5.1)
LYMPHOCYTES RELATIVE PERCENT: 36.9 %
MCH RBC QN AUTO: 32.1 PG (ref 26–34)
MCHC RBC AUTO-ENTMCNC: 33.2 G/DL (ref 31–36)
MCV RBC AUTO: 96.7 FL (ref 80–100)
MONOCYTES ABSOLUTE: 0.4 K/UL (ref 0–1.3)
MONOCYTES RELATIVE PERCENT: 7.8 %
NEUTROPHILS ABSOLUTE: 2.4 K/UL (ref 1.7–7.7)
NEUTROPHILS RELATIVE PERCENT: 45.3 %
PDW BLD-RTO: 12.6 % (ref 12.4–15.4)
PLATELET # BLD: 281 K/UL (ref 135–450)
PMV BLD AUTO: 7.6 FL (ref 5–10.5)
POTASSIUM SERPL-SCNC: 4.7 MMOL/L (ref 3.5–5.1)
RBC # BLD: 4.51 M/UL (ref 4.2–5.9)
SODIUM BLD-SCNC: 142 MMOL/L (ref 136–145)
TOTAL PROTEIN: 7.1 G/DL (ref 6.4–8.2)
TRIGL SERPL-MCNC: 93 MG/DL (ref 0–150)
VLDLC SERPL CALC-MCNC: 19 MG/DL
WBC # BLD: 5.2 K/UL (ref 4–11)

## 2018-02-20 PROCEDURE — 99214 OFFICE O/P EST MOD 30 MIN: CPT | Performed by: INTERNAL MEDICINE

## 2018-02-20 NOTE — PROGRESS NOTES
is well controlled on the compression stockings  Prescription provided for compression stockings.   He takes these to [facility]

## 2018-02-21 LAB
ESTIMATED AVERAGE GLUCOSE: 111.2 MG/DL
HBA1C MFR BLD: 5.5 %

## 2018-06-25 ENCOUNTER — TELEPHONE (OUTPATIENT)
Dept: INTERNAL MEDICINE CLINIC | Age: 58
End: 2018-06-25

## 2018-08-20 ENCOUNTER — OFFICE VISIT (OUTPATIENT)
Dept: INTERNAL MEDICINE CLINIC | Age: 58
End: 2018-08-20

## 2018-08-20 VITALS
BODY MASS INDEX: 27.92 KG/M2 | RESPIRATION RATE: 12 BRPM | DIASTOLIC BLOOD PRESSURE: 60 MMHG | WEIGHT: 195 LBS | HEIGHT: 70 IN | HEART RATE: 60 BPM | SYSTOLIC BLOOD PRESSURE: 110 MMHG

## 2018-08-20 DIAGNOSIS — I10 BENIGN ESSENTIAL HTN: Primary | ICD-10-CM

## 2018-08-20 DIAGNOSIS — R73.01 IMPAIRED FASTING GLUCOSE: ICD-10-CM

## 2018-08-20 DIAGNOSIS — E78.5 HYPERLIPIDEMIA, UNSPECIFIED HYPERLIPIDEMIA TYPE: ICD-10-CM

## 2018-08-20 DIAGNOSIS — I87.2 VENOUS INSUFFICIENCY OF BOTH LOWER EXTREMITIES: ICD-10-CM

## 2018-08-20 DIAGNOSIS — Z12.5 SCREENING FOR PROSTATE CANCER: ICD-10-CM

## 2018-08-20 DIAGNOSIS — E88.81 METABOLIC SYNDROME: ICD-10-CM

## 2018-08-20 DIAGNOSIS — Z23 NEED FOR SHINGLES VACCINE: ICD-10-CM

## 2018-08-20 PROCEDURE — 99214 OFFICE O/P EST MOD 30 MIN: CPT | Performed by: INTERNAL MEDICINE

## 2018-08-20 NOTE — PROGRESS NOTES
[facility]) Physicians  Internal Medicine  Patient Encounter  Flores Mendez D.O., [facility] Portillo        Chief Complaint   Patient presents with   [place]       HPI: 59 y.o. male who has not been seen for a check up in about a year seen today for follow up regarding the status of his current chronic medical problems along with a med review. He has no further follow up with neurology for H/O Ruptured aneurysm. Hyperlipidemia:    Lab Results   Component Value Date    LDLCALC 86 02/20/2018   Patient remains on Lipitor 20 mg daily. He denies any new myalgias. He denies muscle cramping. HTN-- BP at home-- 130's. He denies cough. He does not add salt to foods. He denies HA's. He denies dizziness. He denies lightheadedness or syncope. IFG-- He has lost 15# since he had the stroke. He is walking. He eats well. He tries to stay away from sweets, but he likes potatoes. Lab Results   Component Value Date    LABA1C 5.5 02/20/2018     Lab Results   Component Value Date    .2 02/20/2018      Venous insufficiency-- He wears his stocking daily. He denies swelling. He does get swelling if he does not wear compression stockings. He does walk at work. Past Medical History:   Diagnosis Date    Carotid atherosclerosis     Colon polyps     Dilated aortic root (HCC)     HTN (hypertension)     Hyperlipidemia     Impaired fasting glucose     Mitral regurgitation     Psoriasis     RBBB (right bundle branch block)     Subarachnoid hematoma (HCC) 10/01/2017    Varicose veins     Venous insufficiency     Venous reflux     Saphenofemoral       Review of Systems - As per HPI  PSYCH:  He can still have a temper. Better since counseling. No meds.       OBJECTIVE:  /60   Pulse 60   Resp 12   Ht 5' 9.75\" (1.772 m)   Wt 195 lb (88.5 kg)   BMI 28.18 kg/m²    Wt Readings from Last 3 Encounters:   08/20/18 195 lb (88.5 kg)   02/20/18 198 lb (89.8 kg)   11/17/17 200 lb (90.7 kg)

## 2018-08-20 NOTE — PATIENT INSTRUCTIONS
cookies. · Bake, broil, or steam foods. Don't dunham them. · Be physically active. Get at least 30 minutes of exercise on most days of the week. Walking is a good choice. You also may want to do other activities, such as running, swimming, cycling, or playing tennis or team sports. · Stay at a healthy weight or lose weight by making the changes in eating and physical activity listed above. Losing just a small amount of weight, even 5 to 10 pounds, can reduce your risk for having a heart attack or stroke. · Do not smoke. When should you call for help? Watch closely for changes in your health, and be sure to contact your doctor if:    · You need help making lifestyle changes.     · You have questions about your medicine. Where can you learn more? Go to https://[email or web address] TVpepiceweb.CorvisaCloud. org and sign in to your Dashlane account. Enter A356 in the Fashiontrot box to learn more about \"High Cholesterol: Care Instructions. \"     If you do not have an account, please click on the \"Sign Up Now\" link. Current as of: May 10, 2017  Content Version: 11.7  © 4128-6901 [employer]. Care instructions adapted under license by [facility] ([facility]). If you have questions about a medical condition or this instruction, always ask your healthcare professional. [address] any warranty or liability for your use of this information.

## 2018-09-24 RX ORDER — LISINOPRIL 10 MG/1
TABLET ORAL
Qty: 90 TABLET | Refills: 2 | Status: SHIPPED | OUTPATIENT
Start: 2018-09-24 | End: 2019-06-23 | Stop reason: SDUPTHER

## 2018-09-24 RX ORDER — ATORVASTATIN CALCIUM 20 MG/1
TABLET, FILM COATED ORAL
Qty: 90 TABLET | Refills: 2 | Status: SHIPPED | OUTPATIENT
Start: 2018-09-24 | End: 2019-06-23 | Stop reason: SDUPTHER

## 2019-01-07 ENCOUNTER — OFFICE VISIT (OUTPATIENT)
Dept: ORTHOPEDIC SURGERY | Age: 59
End: 2019-01-07
Payer: COMMERCIAL

## 2019-01-07 VITALS
DIASTOLIC BLOOD PRESSURE: 82 MMHG | WEIGHT: 195 LBS | SYSTOLIC BLOOD PRESSURE: 126 MMHG | BODY MASS INDEX: 26.41 KG/M2 | HEIGHT: 72 IN

## 2019-01-07 DIAGNOSIS — M25.512 ACUTE PAIN OF LEFT SHOULDER: Primary | ICD-10-CM

## 2019-01-07 PROCEDURE — 99204 OFFICE O/P NEW MOD 45 MIN: CPT | Performed by: ORTHOPAEDIC SURGERY

## 2019-01-14 ENCOUNTER — OFFICE VISIT (OUTPATIENT)
Dept: ORTHOPEDIC SURGERY | Age: 59
End: 2019-01-14
Payer: COMMERCIAL

## 2019-01-14 VITALS — WEIGHT: 195 LBS | HEIGHT: 72 IN | BODY MASS INDEX: 26.41 KG/M2

## 2019-01-14 DIAGNOSIS — M65.20 CALCIFIC TENDINITIS: Primary | ICD-10-CM

## 2019-01-14 DIAGNOSIS — M75.41 SUBACROMIAL IMPINGEMENT, RIGHT: ICD-10-CM

## 2019-01-14 DIAGNOSIS — M75.112 INCOMPLETE TEAR OF LEFT ROTATOR CUFF: ICD-10-CM

## 2019-01-14 PROCEDURE — 99214 OFFICE O/P EST MOD 30 MIN: CPT | Performed by: ORTHOPAEDIC SURGERY

## 2019-01-28 RX ORDER — ASPIRIN 81 MG/1
TABLET, COATED ORAL
Qty: 90 TABLET | Refills: 3 | Status: SHIPPED | OUTPATIENT
Start: 2019-01-28 | End: 2020-01-23

## 2019-02-21 ENCOUNTER — OFFICE VISIT (OUTPATIENT)
Dept: INTERNAL MEDICINE CLINIC | Age: 59
End: 2019-02-21
Payer: COMMERCIAL

## 2019-02-21 VITALS
WEIGHT: 195 LBS | DIASTOLIC BLOOD PRESSURE: 74 MMHG | BODY MASS INDEX: 26.41 KG/M2 | RESPIRATION RATE: 12 BRPM | HEART RATE: 58 BPM | SYSTOLIC BLOOD PRESSURE: 122 MMHG | HEIGHT: 72 IN

## 2019-02-21 DIAGNOSIS — E88.81 METABOLIC SYNDROME: ICD-10-CM

## 2019-02-21 DIAGNOSIS — E78.5 HYPERLIPIDEMIA, UNSPECIFIED HYPERLIPIDEMIA TYPE: ICD-10-CM

## 2019-02-21 DIAGNOSIS — Z00.00 ANNUAL PHYSICAL EXAM: Primary | ICD-10-CM

## 2019-02-21 DIAGNOSIS — M75.112 INCOMPLETE TEAR OF LEFT ROTATOR CUFF: ICD-10-CM

## 2019-02-21 DIAGNOSIS — I45.10 RBBB (RIGHT BUNDLE BRANCH BLOCK): ICD-10-CM

## 2019-02-21 DIAGNOSIS — I67.1 ANEURYSM OF CAVERNOUS PORTION OF RIGHT INTERNAL CAROTID ARTERY: ICD-10-CM

## 2019-02-21 DIAGNOSIS — M65.20 CALCIFIC TENDINITIS: ICD-10-CM

## 2019-02-21 DIAGNOSIS — I10 BENIGN ESSENTIAL HTN: ICD-10-CM

## 2019-02-21 DIAGNOSIS — Z12.5 SCREENING FOR PROSTATE CANCER: ICD-10-CM

## 2019-02-21 PROBLEM — M75.41 SUBACROMIAL IMPINGEMENT, RIGHT: Status: RESOLVED | Noted: 2019-01-14 | Resolved: 2019-02-21

## 2019-02-21 PROCEDURE — 93000 ELECTROCARDIOGRAM COMPLETE: CPT | Performed by: INTERNAL MEDICINE

## 2019-02-21 PROCEDURE — 99396 PREV VISIT EST AGE 40-64: CPT | Performed by: INTERNAL MEDICINE

## 2019-02-21 ASSESSMENT — PATIENT HEALTH QUESTIONNAIRE - PHQ9
1. LITTLE INTEREST OR PLEASURE IN DOING THINGS: 0
2. FEELING DOWN, DEPRESSED OR HOPELESS: 0
SUM OF ALL RESPONSES TO PHQ9 QUESTIONS 1 & 2: 0
SUM OF ALL RESPONSES TO PHQ QUESTIONS 1-9: 0
SUM OF ALL RESPONSES TO PHQ QUESTIONS 1-9: 0

## 2019-02-25 ENCOUNTER — TELEPHONE (OUTPATIENT)
Dept: INTERNAL MEDICINE CLINIC | Age: 59
End: 2019-02-25

## 2019-03-08 ENCOUNTER — TELEPHONE (OUTPATIENT)
Dept: ORTHOPEDIC SURGERY | Age: 59
End: 2019-03-08

## 2019-03-11 ENCOUNTER — ANESTHESIA EVENT (OUTPATIENT)
Dept: OPERATING ROOM | Age: 59
End: 2019-03-11
Payer: COMMERCIAL

## 2019-03-12 ENCOUNTER — TELEPHONE (OUTPATIENT)
Dept: ORTHOPEDIC SURGERY | Age: 59
End: 2019-03-12

## 2019-03-14 DIAGNOSIS — M75.112 INCOMPLETE TEAR OF LEFT ROTATOR CUFF: ICD-10-CM

## 2019-03-14 DIAGNOSIS — M75.41 SUBACROMIAL IMPINGEMENT, RIGHT: Primary | ICD-10-CM

## 2019-03-14 DIAGNOSIS — M65.20 CALCIFIC TENDINITIS: ICD-10-CM

## 2019-03-14 RX ORDER — MELOXICAM 15 MG/1
15 TABLET ORAL DAILY
Qty: 90 TABLET | Refills: 3 | Status: SHIPPED | OUTPATIENT
Start: 2019-03-19 | End: 2019-08-21 | Stop reason: ALTCHOICE

## 2019-03-14 RX ORDER — OXYCODONE HYDROCHLORIDE 10 MG/1
10 TABLET ORAL EVERY 8 HOURS PRN
Qty: 21 TABLET | Refills: 0 | Status: SHIPPED | OUTPATIENT
Start: 2019-03-19 | End: 2019-03-26

## 2019-03-18 ENCOUNTER — SURG/PROC ORDERS (OUTPATIENT)
Dept: ORTHOPEDIC SURGERY | Age: 59
End: 2019-03-18

## 2019-03-18 RX ORDER — DOCUSATE SODIUM 100 MG/1
100 CAPSULE, LIQUID FILLED ORAL 2 TIMES DAILY
Status: CANCELLED | OUTPATIENT
Start: 2019-03-18

## 2019-03-19 ENCOUNTER — ANESTHESIA (OUTPATIENT)
Dept: OPERATING ROOM | Age: 59
End: 2019-03-19
Payer: COMMERCIAL

## 2019-03-19 ENCOUNTER — HOSPITAL ENCOUNTER (OUTPATIENT)
Age: 59
Setting detail: OUTPATIENT SURGERY
Discharge: HOME | End: 2019-03-19
Attending: ORTHOPAEDIC SURGERY | Admitting: ORTHOPAEDIC SURGERY
Payer: COMMERCIAL

## 2019-03-19 VITALS
TEMPERATURE: 95.5 F | OXYGEN SATURATION: 99 % | RESPIRATION RATE: 5 BRPM | SYSTOLIC BLOOD PRESSURE: 174 MMHG | DIASTOLIC BLOOD PRESSURE: 98 MMHG

## 2019-03-19 VITALS
DIASTOLIC BLOOD PRESSURE: 74 MMHG | SYSTOLIC BLOOD PRESSURE: 124 MMHG | HEART RATE: 72 BPM | HEIGHT: 72 IN | TEMPERATURE: 97.1 F | OXYGEN SATURATION: 98 % | RESPIRATION RATE: 17 BRPM | BODY MASS INDEX: 26.28 KG/M2 | WEIGHT: 194 LBS

## 2019-03-19 PROCEDURE — 6360000002 HC RX W HCPCS: Performed by: NURSE ANESTHETIST, CERTIFIED REGISTERED

## 2019-03-19 PROCEDURE — 3600000014 HC SURGERY LEVEL 4 ADDTL 15MIN: Performed by: ORTHOPAEDIC SURGERY

## 2019-03-19 PROCEDURE — 6360000002 HC RX W HCPCS: Performed by: ANESTHESIOLOGY

## 2019-03-19 PROCEDURE — 7100000001 HC PACU RECOVERY - ADDTL 15 MIN: Performed by: ORTHOPAEDIC SURGERY

## 2019-03-19 PROCEDURE — 2580000003 HC RX 258: Performed by: ORTHOPAEDIC SURGERY

## 2019-03-19 PROCEDURE — 2500000003 HC RX 250 WO HCPCS: Performed by: NURSE ANESTHETIST, CERTIFIED REGISTERED

## 2019-03-19 PROCEDURE — 2500000003 HC RX 250 WO HCPCS: Performed by: ORTHOPAEDIC SURGERY

## 2019-03-19 PROCEDURE — 3600000004 HC SURGERY LEVEL 4 BASE: Performed by: ORTHOPAEDIC SURGERY

## 2019-03-19 PROCEDURE — 6360000002 HC RX W HCPCS: Performed by: ORTHOPAEDIC SURGERY

## 2019-03-19 PROCEDURE — C1713 ANCHOR/SCREW BN/BN,TIS/BN: HCPCS | Performed by: ORTHOPAEDIC SURGERY

## 2019-03-19 PROCEDURE — 3700000000 HC ANESTHESIA ATTENDED CARE: Performed by: ORTHOPAEDIC SURGERY

## 2019-03-19 PROCEDURE — 3700000001 HC ADD 15 MINUTES (ANESTHESIA): Performed by: ORTHOPAEDIC SURGERY

## 2019-03-19 PROCEDURE — 2709999900 HC NON-CHARGEABLE SUPPLY: Performed by: ORTHOPAEDIC SURGERY

## 2019-03-19 PROCEDURE — 7100000010 HC PHASE II RECOVERY - FIRST 15 MIN: Performed by: ORTHOPAEDIC SURGERY

## 2019-03-19 PROCEDURE — 2720000010 HC SURG SUPPLY STERILE: Performed by: ORTHOPAEDIC SURGERY

## 2019-03-19 PROCEDURE — 7100000000 HC PACU RECOVERY - FIRST 15 MIN: Performed by: ORTHOPAEDIC SURGERY

## 2019-03-19 PROCEDURE — 64415 NJX AA&/STRD BRCH PLXS IMG: CPT | Performed by: ANESTHESIOLOGY

## 2019-03-19 PROCEDURE — L3660 SO 8 AB RSTR CAN/WEB PRE OTS: HCPCS | Performed by: ORTHOPAEDIC SURGERY

## 2019-03-19 PROCEDURE — 7100000011 HC PHASE II RECOVERY - ADDTL 15 MIN: Performed by: ORTHOPAEDIC SURGERY

## 2019-03-19 DEVICE — Z INACTIVE USE 2600835 ANCHOR TEND 8 FOR REGENETEN BIOINDUCTIVE IMPL SYS: Type: IMPLANTABLE DEVICE | Site: SHOULDER | Status: FUNCTIONAL

## 2019-03-19 DEVICE — IMPLANTABLE DEVICE
Type: IMPLANTABLE DEVICE | Site: SHOULDER | Status: FUNCTIONAL
Brand: ROTATION MEDICAL RECONSTITUTED COLLAGEN SCAFFOLD - ARTHROSCOPIC, MEDIUM

## 2019-03-19 RX ORDER — FENTANYL CITRATE 50 UG/ML
100 INJECTION, SOLUTION INTRAMUSCULAR; INTRAVENOUS ONCE
Status: COMPLETED | OUTPATIENT
Start: 2019-03-19 | End: 2019-03-19

## 2019-03-19 RX ORDER — LABETALOL HYDROCHLORIDE 5 MG/ML
5 INJECTION, SOLUTION INTRAVENOUS EVERY 10 MIN PRN
Status: DISCONTINUED | OUTPATIENT
Start: 2019-03-19 | End: 2019-03-19 | Stop reason: HOSPADM

## 2019-03-19 RX ORDER — DEXAMETHASONE SODIUM PHOSPHATE 4 MG/ML
INJECTION, SOLUTION INTRA-ARTICULAR; INTRALESIONAL; INTRAMUSCULAR; INTRAVENOUS; SOFT TISSUE PRN
Status: DISCONTINUED | OUTPATIENT
Start: 2019-03-19 | End: 2019-03-19 | Stop reason: SDUPTHER

## 2019-03-19 RX ORDER — ONDANSETRON 2 MG/ML
INJECTION INTRAMUSCULAR; INTRAVENOUS PRN
Status: DISCONTINUED | OUTPATIENT
Start: 2019-03-19 | End: 2019-03-19 | Stop reason: SDUPTHER

## 2019-03-19 RX ORDER — CEFAZOLIN SODIUM 2 G/100ML
2 INJECTION, SOLUTION INTRAVENOUS
Status: COMPLETED | OUTPATIENT
Start: 2019-03-19 | End: 2019-03-19

## 2019-03-19 RX ORDER — HYDROMORPHONE HCL 110MG/55ML
0.5 PATIENT CONTROLLED ANALGESIA SYRINGE INTRAVENOUS EVERY 5 MIN PRN
Status: DISCONTINUED | OUTPATIENT
Start: 2019-03-19 | End: 2019-03-19 | Stop reason: HOSPADM

## 2019-03-19 RX ORDER — ONDANSETRON 2 MG/ML
4 INJECTION INTRAMUSCULAR; INTRAVENOUS
Status: DISCONTINUED | OUTPATIENT
Start: 2019-03-19 | End: 2019-03-19 | Stop reason: HOSPADM

## 2019-03-19 RX ORDER — EPHEDRINE SULFATE 50 MG/ML
INJECTION INTRAVENOUS PRN
Status: DISCONTINUED | OUTPATIENT
Start: 2019-03-19 | End: 2019-03-19 | Stop reason: SDUPTHER

## 2019-03-19 RX ORDER — SODIUM CHLORIDE 0.9 % (FLUSH) 0.9 %
10 SYRINGE (ML) INJECTION EVERY 12 HOURS SCHEDULED
Status: DISCONTINUED | OUTPATIENT
Start: 2019-03-19 | End: 2019-03-19 | Stop reason: HOSPADM

## 2019-03-19 RX ORDER — SODIUM CHLORIDE, SODIUM LACTATE, POTASSIUM CHLORIDE, CALCIUM CHLORIDE 600; 310; 30; 20 MG/100ML; MG/100ML; MG/100ML; MG/100ML
INJECTION, SOLUTION INTRAVENOUS CONTINUOUS
Status: DISCONTINUED | OUTPATIENT
Start: 2019-03-19 | End: 2019-03-19 | Stop reason: HOSPADM

## 2019-03-19 RX ORDER — GLYCOPYRROLATE 0.2 MG/ML
INJECTION INTRAMUSCULAR; INTRAVENOUS PRN
Status: DISCONTINUED | OUTPATIENT
Start: 2019-03-19 | End: 2019-03-19 | Stop reason: SDUPTHER

## 2019-03-19 RX ORDER — OXYCODONE HYDROCHLORIDE AND ACETAMINOPHEN 5; 325 MG/1; MG/1
1 TABLET ORAL
Status: DISCONTINUED | OUTPATIENT
Start: 2019-03-19 | End: 2019-03-19 | Stop reason: HOSPADM

## 2019-03-19 RX ORDER — PROPOFOL 10 MG/ML
INJECTION, EMULSION INTRAVENOUS PRN
Status: DISCONTINUED | OUTPATIENT
Start: 2019-03-19 | End: 2019-03-19 | Stop reason: SDUPTHER

## 2019-03-19 RX ORDER — ROCURONIUM BROMIDE 10 MG/ML
INJECTION, SOLUTION INTRAVENOUS PRN
Status: DISCONTINUED | OUTPATIENT
Start: 2019-03-19 | End: 2019-03-19 | Stop reason: SDUPTHER

## 2019-03-19 RX ORDER — LIDOCAINE HYDROCHLORIDE 10 MG/ML
1 INJECTION, SOLUTION EPIDURAL; INFILTRATION; INTRACAUDAL; PERINEURAL
Status: DISCONTINUED | OUTPATIENT
Start: 2019-03-19 | End: 2019-03-19 | Stop reason: HOSPADM

## 2019-03-19 RX ORDER — KETOROLAC TROMETHAMINE 30 MG/ML
INJECTION, SOLUTION INTRAMUSCULAR; INTRAVENOUS PRN
Status: DISCONTINUED | OUTPATIENT
Start: 2019-03-19 | End: 2019-03-19 | Stop reason: SDUPTHER

## 2019-03-19 RX ORDER — HYDRALAZINE HYDROCHLORIDE 20 MG/ML
5 INJECTION INTRAMUSCULAR; INTRAVENOUS EVERY 10 MIN PRN
Status: DISCONTINUED | OUTPATIENT
Start: 2019-03-19 | End: 2019-03-19 | Stop reason: HOSPADM

## 2019-03-19 RX ORDER — LIDOCAINE HYDROCHLORIDE 20 MG/ML
INJECTION, SOLUTION INFILTRATION; PERINEURAL PRN
Status: DISCONTINUED | OUTPATIENT
Start: 2019-03-19 | End: 2019-03-19 | Stop reason: SDUPTHER

## 2019-03-19 RX ORDER — MIDAZOLAM HYDROCHLORIDE 1 MG/ML
2 INJECTION INTRAMUSCULAR; INTRAVENOUS ONCE
Status: COMPLETED | OUTPATIENT
Start: 2019-03-19 | End: 2019-03-19

## 2019-03-19 RX ORDER — SODIUM CHLORIDE 0.9 % (FLUSH) 0.9 %
10 SYRINGE (ML) INJECTION PRN
Status: DISCONTINUED | OUTPATIENT
Start: 2019-03-19 | End: 2019-03-19 | Stop reason: HOSPADM

## 2019-03-19 RX ORDER — DOCUSATE SODIUM 100 MG/1
100 CAPSULE, LIQUID FILLED ORAL 2 TIMES DAILY
Status: DISCONTINUED | OUTPATIENT
Start: 2019-03-19 | End: 2019-03-19 | Stop reason: HOSPADM

## 2019-03-19 RX ORDER — FENTANYL CITRATE 50 UG/ML
25 INJECTION, SOLUTION INTRAMUSCULAR; INTRAVENOUS EVERY 5 MIN PRN
Status: DISCONTINUED | OUTPATIENT
Start: 2019-03-19 | End: 2019-03-19 | Stop reason: HOSPADM

## 2019-03-19 RX ORDER — SUCCINYLCHOLINE CHLORIDE 20 MG/ML
INJECTION INTRAMUSCULAR; INTRAVENOUS PRN
Status: DISCONTINUED | OUTPATIENT
Start: 2019-03-19 | End: 2019-03-19 | Stop reason: SDUPTHER

## 2019-03-19 RX ADMIN — ONDANSETRON 4 MG: 2 INJECTION INTRAMUSCULAR; INTRAVENOUS at 07:18

## 2019-03-19 RX ADMIN — GLYCOPYRROLATE 0.2 MG: 0.2 INJECTION, SOLUTION INTRAMUSCULAR; INTRAVENOUS at 08:04

## 2019-03-19 RX ADMIN — PROPOFOL 150 MG: 10 INJECTION, EMULSION INTRAVENOUS at 07:10

## 2019-03-19 RX ADMIN — LIDOCAINE HYDROCHLORIDE 75 MG: 20 INJECTION, SOLUTION INFILTRATION; PERINEURAL at 07:10

## 2019-03-19 RX ADMIN — ROCURONIUM BROMIDE 5 MG: 10 INJECTION, SOLUTION INTRAVENOUS at 07:10

## 2019-03-19 RX ADMIN — KETOROLAC TROMETHAMINE 15 MG: 30 INJECTION, SOLUTION INTRAMUSCULAR; INTRAVENOUS at 08:24

## 2019-03-19 RX ADMIN — EPHEDRINE SULFATE 10 MG: 50 INJECTION, SOLUTION INTRAVENOUS at 07:32

## 2019-03-19 RX ADMIN — SODIUM CHLORIDE, POTASSIUM CHLORIDE, SODIUM LACTATE AND CALCIUM CHLORIDE: 600; 310; 30; 20 INJECTION, SOLUTION INTRAVENOUS at 06:29

## 2019-03-19 RX ADMIN — CEFAZOLIN SODIUM 2 G: 2 INJECTION, SOLUTION INTRAVENOUS at 06:59

## 2019-03-19 RX ADMIN — SUCCINYLCHOLINE CHLORIDE 140 MG: 20 INJECTION, SOLUTION INTRAMUSCULAR; INTRAVENOUS at 07:11

## 2019-03-19 RX ADMIN — MIDAZOLAM 1 MG: 1 INJECTION INTRAMUSCULAR; INTRAVENOUS at 06:50

## 2019-03-19 RX ADMIN — FENTANYL CITRATE 50 MCG: 50 INJECTION INTRAMUSCULAR; INTRAVENOUS at 06:49

## 2019-03-19 RX ADMIN — EPHEDRINE SULFATE 10 MG: 50 INJECTION, SOLUTION INTRAVENOUS at 08:00

## 2019-03-19 RX ADMIN — DEXAMETHASONE SODIUM PHOSPHATE 8 MG: 4 INJECTION, SOLUTION INTRAMUSCULAR; INTRAVENOUS at 07:18

## 2019-03-19 ASSESSMENT — PULMONARY FUNCTION TESTS
PIF_VALUE: 17
PIF_VALUE: 28
PIF_VALUE: 17
PIF_VALUE: 20
PIF_VALUE: 15
PIF_VALUE: 17
PIF_VALUE: 17
PIF_VALUE: 20
PIF_VALUE: 24
PIF_VALUE: 21
PIF_VALUE: 22
PIF_VALUE: 16
PIF_VALUE: 20
PIF_VALUE: 19
PIF_VALUE: 21
PIF_VALUE: 15
PIF_VALUE: 24
PIF_VALUE: 21
PIF_VALUE: 20
PIF_VALUE: 16
PIF_VALUE: 16
PIF_VALUE: 17
PIF_VALUE: 21
PIF_VALUE: 20
PIF_VALUE: 23
PIF_VALUE: 19
PIF_VALUE: 17
PIF_VALUE: 15
PIF_VALUE: 21
PIF_VALUE: 17
PIF_VALUE: 17
PIF_VALUE: 18
PIF_VALUE: 21
PIF_VALUE: 20
PIF_VALUE: 23
PIF_VALUE: 19
PIF_VALUE: 4
PIF_VALUE: 18
PIF_VALUE: 17
PIF_VALUE: 21
PIF_VALUE: 20
PIF_VALUE: 16
PIF_VALUE: 20
PIF_VALUE: 21
PIF_VALUE: 17
PIF_VALUE: 17
PIF_VALUE: 20
PIF_VALUE: 16
PIF_VALUE: 21
PIF_VALUE: 21
PIF_VALUE: 17
PIF_VALUE: 4
PIF_VALUE: 17
PIF_VALUE: 21
PIF_VALUE: 17
PIF_VALUE: 21
PIF_VALUE: 24
PIF_VALUE: 21
PIF_VALUE: 23
PIF_VALUE: 21
PIF_VALUE: 20
PIF_VALUE: 17
PIF_VALUE: 21
PIF_VALUE: 23
PIF_VALUE: 20
PIF_VALUE: 17
PIF_VALUE: 21
PIF_VALUE: 17
PIF_VALUE: 18
PIF_VALUE: 23
PIF_VALUE: 17
PIF_VALUE: 16
PIF_VALUE: 17
PIF_VALUE: 17

## 2019-03-19 ASSESSMENT — ENCOUNTER SYMPTOMS: SHORTNESS OF BREATH: 0

## 2019-03-19 ASSESSMENT — PAIN - FUNCTIONAL ASSESSMENT: PAIN_FUNCTIONAL_ASSESSMENT: 0-10

## 2019-03-20 DIAGNOSIS — M75.112 INCOMPLETE TEAR OF LEFT ROTATOR CUFF: Primary | ICD-10-CM

## 2019-03-20 DIAGNOSIS — M75.42 SUBACROMIAL IMPINGEMENT OF LEFT SHOULDER: ICD-10-CM

## 2019-03-25 ENCOUNTER — OFFICE VISIT (OUTPATIENT)
Dept: ORTHOPEDIC SURGERY | Age: 59
End: 2019-03-25

## 2019-03-25 DIAGNOSIS — M75.112 INCOMPLETE TEAR OF LEFT ROTATOR CUFF: Primary | ICD-10-CM

## 2019-03-25 DIAGNOSIS — M75.42 SUBACROMIAL IMPINGEMENT OF LEFT SHOULDER: ICD-10-CM

## 2019-03-25 DIAGNOSIS — M65.20 CALCIFIC TENDINITIS: ICD-10-CM

## 2019-03-25 PROCEDURE — 99024 POSTOP FOLLOW-UP VISIT: CPT | Performed by: ORTHOPAEDIC SURGERY

## 2019-04-29 ENCOUNTER — OFFICE VISIT (OUTPATIENT)
Dept: ORTHOPEDIC SURGERY | Age: 59
End: 2019-04-29

## 2019-04-29 VITALS — HEIGHT: 72 IN | WEIGHT: 194 LBS | BODY MASS INDEX: 26.28 KG/M2

## 2019-04-29 DIAGNOSIS — M75.112 INCOMPLETE TEAR OF LEFT ROTATOR CUFF: Primary | ICD-10-CM

## 2019-04-29 DIAGNOSIS — M65.20 CALCIFIC TENDINITIS: ICD-10-CM

## 2019-04-29 DIAGNOSIS — M75.42 SUBACROMIAL IMPINGEMENT OF LEFT SHOULDER: ICD-10-CM

## 2019-04-29 PROCEDURE — 99024 POSTOP FOLLOW-UP VISIT: CPT | Performed by: ORTHOPAEDIC SURGERY

## 2019-04-29 NOTE — PROGRESS NOTES
History of Present of Illness:  S/P Rotator Cuff Repair  The patient returns today for left shoulder evaluation 5 weeks after rotator cuff repair    Examination:  Inspection reveals warm, dry, intact skin. There is no adenopathy. The distal neurovascular exam is grossly intact. Examination of the contralateral shoulder reveals no atrophy or deformity. The skin is warm and dry. Range of motion is within normal limits. There is no focal tenderness with palpation. Provocative SLAP, biceps tension, apprehension AC joint or rotator cuff tests are negative. Strength is graded 5/5 in all muscle groups outside of the rotator cuff. Rotator cuff strength is intact. The distal neurovascular exam is grossly intact. Cervical spine: The skin is warm and dry. There is no swelling, warmth, or erythema. Range of motion is within normal limits. There is no paraspinal or spinous process tenderness. Spurling's sign is negative and did not produce shoulder pain. The distal neurovascular exam is grossly intact. Diagnostic Test Findings:    No orders of the defined types were placed in this encounter. Treatment Plan:  He needs to work on his range of motion slowly and I'll see him back here in 5-6 weeks      Disclaimer: \"This note was dictated with voice recognition software. Though review and correction are routine, we apologize for any errors. \"

## 2019-06-10 ENCOUNTER — OFFICE VISIT (OUTPATIENT)
Dept: ORTHOPEDIC SURGERY | Age: 59
End: 2019-06-10

## 2019-06-10 VITALS — BODY MASS INDEX: 26.28 KG/M2 | HEIGHT: 72 IN | WEIGHT: 194 LBS

## 2019-06-10 DIAGNOSIS — M75.42 SUBACROMIAL IMPINGEMENT OF LEFT SHOULDER: ICD-10-CM

## 2019-06-10 DIAGNOSIS — M75.112 NONTRAUMATIC INCOMPLETE TEAR OF LEFT ROTATOR CUFF: Primary | ICD-10-CM

## 2019-06-10 DIAGNOSIS — M65.20 CALCIFIC TENDINITIS: ICD-10-CM

## 2019-06-10 PROCEDURE — 99024 POSTOP FOLLOW-UP VISIT: CPT | Performed by: ORTHOPAEDIC SURGERY

## 2019-06-10 NOTE — PROGRESS NOTES
History of Present of Illness:  S/P Rotator Cuff Repair  The patient returns today for left shoulder evaluation 10 weeks after rotator cuff repair    Examination:  Inspection reveals warm, dry, intact skin. There is no adenopathy. The distal neurovascular exam is grossly intact. Examination of the contralateral shoulder reveals no atrophy or deformity. The skin is warm and dry. Range of motion is within normal limits. There is no focal tenderness with palpation. Provocative SLAP, biceps tension, apprehension AC joint or rotator cuff tests are negative. Strength is graded 5/5 in all muscle groups outside of the rotator cuff. Rotator cuff strength is intact. The distal neurovascular exam is grossly intact. Cervical spine: The skin is warm and dry. There is no swelling, warmth, or erythema. Range of motion is within normal limits. There is no paraspinal or spinous process tenderness. Spurling's sign is negative and did not produce shoulder pain. The distal neurovascular exam is grossly intact. Diagnostic Test Findings:    No orders of the defined types were placed in this encounter. Treatment Plan:  He still needs to work a little bit on his range of motion especially external and internal rotation overall though he is doing very well we will get him return him to work      Disclaimer: \"This note was dictated with voice recognition software. Though review and correction are routine, we apologize for any errors. \"

## 2019-06-13 ENCOUNTER — OFFICE VISIT (OUTPATIENT)
Dept: ORTHOPEDIC SURGERY | Age: 59
End: 2019-06-13

## 2019-06-13 DIAGNOSIS — M75.42 SUBACROMIAL IMPINGEMENT OF LEFT SHOULDER: ICD-10-CM

## 2019-06-13 DIAGNOSIS — M75.112 NONTRAUMATIC INCOMPLETE TEAR OF LEFT ROTATOR CUFF: Primary | ICD-10-CM

## 2019-06-13 DIAGNOSIS — M65.20 CALCIFIC TENDINITIS: ICD-10-CM

## 2019-06-13 PROCEDURE — 99024 POSTOP FOLLOW-UP VISIT: CPT | Performed by: ORTHOPAEDIC SURGERY

## 2019-06-14 ENCOUNTER — TELEPHONE (OUTPATIENT)
Dept: ORTHOPEDIC SURGERY | Age: 59
End: 2019-06-14

## 2019-06-24 RX ORDER — LISINOPRIL 10 MG/1
TABLET ORAL
Qty: 90 TABLET | Refills: 2 | Status: SHIPPED | OUTPATIENT
Start: 2019-06-24 | End: 2020-03-20

## 2019-06-24 RX ORDER — ATORVASTATIN CALCIUM 20 MG/1
TABLET, FILM COATED ORAL
Qty: 90 TABLET | Refills: 2 | Status: SHIPPED | OUTPATIENT
Start: 2019-06-24 | End: 2020-03-20

## 2019-08-05 ENCOUNTER — OFFICE VISIT (OUTPATIENT)
Dept: ORTHOPEDIC SURGERY | Age: 59
End: 2019-08-05
Payer: COMMERCIAL

## 2019-08-05 VITALS — HEIGHT: 72 IN | WEIGHT: 194 LBS | BODY MASS INDEX: 26.28 KG/M2

## 2019-08-05 DIAGNOSIS — M75.112 NONTRAUMATIC INCOMPLETE TEAR OF LEFT ROTATOR CUFF: Primary | ICD-10-CM

## 2019-08-05 DIAGNOSIS — M75.42 SUBACROMIAL IMPINGEMENT OF LEFT SHOULDER: ICD-10-CM

## 2019-08-05 PROCEDURE — 99214 OFFICE O/P EST MOD 30 MIN: CPT | Performed by: ORTHOPAEDIC SURGERY

## 2019-08-05 NOTE — PROGRESS NOTES
extremity does not show any tenderness, deformity or injury. Range of motion is unremarkable. There is no gross instability. There are no rashes, ulcerations or lesions. Strength and tone are normal.  Right Upper Extremity:  Examination of the right upper extremity does not show any tenderness, deformity or injury. Range of motion is unremarkable. There is no gross instability. There are no rashes, ulcerations or lesions. Strength and tone are normal.  Neck: Examination of the neck does not show any tenderness, deformity or injury. Range of motion is unremarkable. There is no gross instability. There are no rashes, ulcerations or lesions. Strength and tone are normal.    Past Surgical History:   Procedure Laterality Date    COLONOSCOPY  2001    COLONOSCOPY  2005    COLONOSCOPY  3/2010    Dr. Arline Davila Right 1/8/2013    Dr. Ambriz Comp ARTHROSCOPY Left 3/19/2019    LEFT SHOULDER ARTHROSCOPE WITH DEBRIDEMENT, SUBACROMIAL DECOMPRESSION, DISTAL CLAVICLE EXCISION, CALCIFIC TENDONITIS DEBRIDEMENT WITH ROTATOR CUFF AUGMENTATION performed by Xiomara Cerrato DO at [address] [place] [address]  6/22/2015    RF ablation SFJ, calf , Dr. Jaime Hutchinson      cyst     Assessment: Left shoulder postop rotator cuff repair doing extremely well he also had a calcific tendinitis debridement    Impression: Same    Office Procedures:  No orders of the defined types were placed in this encounter. Previous Treatments: X-ray, MRI, shoulder arthroscopy, physical therapy,    Treatment Plan: Increase activities as tolerated return to work Monday follow-up as needed      Adama Correa D.O. [facility]  Sports Fellowship Trained  Board Certified  [employer]ov[employer] Sheila Team Physician      Disclaimer: \"This note was dictated with voice recognition software. Though review and correction are routine, we apologize for any errors. \"

## 2019-08-07 ENCOUNTER — TELEPHONE (OUTPATIENT)
Dept: ORTHOPEDIC SURGERY | Age: 59
End: 2019-08-07

## 2019-08-19 ENCOUNTER — TELEPHONE (OUTPATIENT)
Dept: INTERNAL MEDICINE CLINIC | Age: 59
End: 2019-08-19

## 2019-08-21 ENCOUNTER — OFFICE VISIT (OUTPATIENT)
Dept: INTERNAL MEDICINE CLINIC | Age: 59
End: 2019-08-21
Payer: COMMERCIAL

## 2019-08-21 VITALS
SYSTOLIC BLOOD PRESSURE: 115 MMHG | RESPIRATION RATE: 12 BRPM | BODY MASS INDEX: 25.73 KG/M2 | HEART RATE: 54 BPM | DIASTOLIC BLOOD PRESSURE: 60 MMHG | HEIGHT: 72 IN | OXYGEN SATURATION: 99 % | WEIGHT: 190 LBS

## 2019-08-21 DIAGNOSIS — R73.01 IMPAIRED FASTING GLUCOSE: ICD-10-CM

## 2019-08-21 DIAGNOSIS — I10 BENIGN ESSENTIAL HTN: Primary | ICD-10-CM

## 2019-08-21 DIAGNOSIS — E78.5 HYPERLIPIDEMIA, UNSPECIFIED HYPERLIPIDEMIA TYPE: ICD-10-CM

## 2019-08-21 PROCEDURE — 99214 OFFICE O/P EST MOD 30 MIN: CPT | Performed by: INTERNAL MEDICINE

## 2019-08-21 NOTE — PROGRESS NOTES
Comprehensive Metabolic Panel; Future  - Lipid Panel; Future    3. Impaired fasting glucose  Condition stability is uncertain. Due for lab  Continue a sensible low simple sugar diet. Continue portion control  Check A1c twice a year.   - Hemoglobin A1C; Future

## 2020-01-23 RX ORDER — ASPIRIN 81 MG/1
TABLET, COATED ORAL
Qty: 90 TABLET | Refills: 3 | Status: SHIPPED | OUTPATIENT
Start: 2020-01-23 | End: 2021-01-18

## 2020-01-23 NOTE — TELEPHONE ENCOUNTER
Last appointment: 8/21/2019  Next appointment: None scheduled  Last refill: 01/28/19 # 90 with 3 refills

## 2020-03-20 RX ORDER — ATORVASTATIN CALCIUM 20 MG/1
TABLET, FILM COATED ORAL
Qty: 90 TABLET | Refills: 3 | Status: SHIPPED | OUTPATIENT
Start: 2020-03-20 | End: 2021-03-15

## 2020-03-20 RX ORDER — LISINOPRIL 10 MG/1
TABLET ORAL
Qty: 90 TABLET | Refills: 3 | Status: SHIPPED | OUTPATIENT
Start: 2020-03-20 | End: 2021-03-15

## 2020-08-25 ENCOUNTER — VIRTUAL VISIT (OUTPATIENT)
Dept: INTERNAL MEDICINE CLINIC | Age: 60
End: 2020-08-25
Payer: COMMERCIAL

## 2020-08-25 PROBLEM — R73.01 IMPAIRED FASTING GLUCOSE: Status: ACTIVE | Noted: 2020-08-25

## 2020-08-25 PROBLEM — I67.1 ANEURYSM OF CAVERNOUS PORTION OF RIGHT INTERNAL CAROTID ARTERY: Status: ACTIVE | Noted: 2020-08-25

## 2020-08-25 PROBLEM — I87.2 VENOUS INSUFFICIENCY OF BOTH LOWER EXTREMITIES: Status: ACTIVE | Noted: 2020-08-25

## 2020-08-25 PROCEDURE — 99214 OFFICE O/P EST MOD 30 MIN: CPT | Performed by: INTERNAL MEDICINE

## 2020-08-25 ASSESSMENT — PATIENT HEALTH QUESTIONNAIRE - PHQ9
SUM OF ALL RESPONSES TO PHQ QUESTIONS 1-9: 0
2. FEELING DOWN, DEPRESSED OR HOPELESS: 0
SUM OF ALL RESPONSES TO PHQ QUESTIONS 1-9: 0
1. LITTLE INTEREST OR PLEASURE IN DOING THINGS: 0
SUM OF ALL RESPONSES TO PHQ9 QUESTIONS 1 & 2: 0

## 2020-08-25 ASSESSMENT — ENCOUNTER SYMPTOMS
TROUBLE SWALLOWING: 0
DIARRHEA: 0
SHORTNESS OF BREATH: 0
VOMITING: 0
NAUSEA: 0
ABDOMINAL PAIN: 0
COUGH: 0

## 2020-08-25 NOTE — PATIENT INSTRUCTIONS
To do list:      #1 schedule your consultation with sleep specialist    #2 obtain your lab test as soon as possible. You can do this at the [facility]    #3 continue efforts with Lifestyle modification including low calorie diet focusing on Low fat/low cholesterol and low carbohydrate intake, along with  increasing cardiovascular (aerobic) exercise. #4 return in the next 4 to 6 months for your physical.    #5 MRA of your brain is due.   Please schedule at the same location

## 2020-08-25 NOTE — PROGRESS NOTES
2020    [facility] Phys[facility]  Internal Medicine  Patient Encounter  Hima Nguyen D.O., [address] -- Audio/Visual (During JMDJL-34 public health emergency)      I discussed at this telephone/video visit is a nontraditional type of visit that we are conducting in lieu of an office visit to minimize patient risk during the coronavirus pandemic. I discussed with the patient that I would not be able to perform a full physical examination, but that in most other respects the visit would be beneficial to his/her continued medical care. He/She again gave verbal consent for us to conduct this type of visit. Chief Complaint   Patient presents with    Sleep Apnea    Check-Up    Medication Check         HPI:    Dallas Lam (:  1960) has requested an audio/video evaluation for the following concern(s): Checkup and medication review    62 y.o. male being evaluated via virtual video visit due to the coronavirus pandemic emergency and public health crisis and inability to see the patient face-to-face in the office. Patient is being evaluated regarding the status of current chronic medical problems as the below along with a medication review and reconciliation. The latter is accurate without discrepancies. He is also expressing concern about sleep apnea. Pt states he has been witnessed stopping breathing. He has also been told he snores. He has been told this for several years. He occasionally has lack of energy during the day. He may get drowsy driving. He is requesting a referral to sleep specialist.        Hyperlipidemia:    Lab Results   Component Value Date     [address] 90 2019    LDLDIRECT 94 2017    Patient remains on Lipitor 20 mg daily. He denies any new myalgias. He denies muscle cramping.       HTN-- H/O Subarachnoid hemorrhage due to cerebral aneurysm rupture in 2017. He states is BP has been well controlled.   He denies HA's, dizziness, episodes of unilateral weakness or paresthesias.       IFG-- He states his weight has been OK. He denies changes. He denies excessive thirst or frequent urination. Lab Results   Component Value Date    LABA1C 5.6 08/21/2019     Lab Results   Component Value Date    .0 08/21/2019       Venous insufficiency-- he has not been wearing his compression stockings. He has not been on his feet at much.           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 Authorizing Provider   lisinopril (PRINIVIL;ZESTRIL) 10 MG tablet TAKE 1 TABLET DAILY Yes Kane Loco DO   atorvastatin (LIPITOR) 20 MG tablet TAKE 1 TABLET DAILY Yes Kane Loco DO   ASPIRIN LOW DOSE 81 MG EC tablet TAKE 1 TABLET DAILY Yes Kane Loco DO   Cetirizine HCl (ZYRTEC ALLERGY PO)   Take 10 mg by mouth daily  Yes Historical Provider, MD         Review of Systems   Constitutional: Negative for chills, fever and unexpected weight change. HENT: Negative for hearing loss, nosebleeds and trouble swallowing. Eyes: Negative for visual disturbance. Respiratory: Negative for cough and shortness of breath. Cardiovascular: Negative for chest pain, palpitations and leg swelling. No claudication  No Orthopnea     Gastrointestinal: Negative for abdominal pain, diarrhea, nausea and vomiting. Genitourinary: Negative for difficulty urinating. Neurological: Negative for dizziness and headaches. No unilateral weakness or paresthesias           PHYSICAL EXAMINATION:    Vital Signs: (As obtained by patient/caregiver or practitioner observation)    There were no vitals filed for this visit. There is no height or weight on file to calculate BMI. Wt Readings from Last 3 Encounters:   08/21/19 190 lb (86.2 kg)   08/05/19 194 lb 0.1 oz (88 kg)   06/10/19 194 lb 0.1 oz (88 kg)     BP Readings from Last 3 Encounters:   08/21/19 115/60   03/19/19 (!) 174/98   03/19/19 124/74           Physical Exam  Constitutional:       General: He is not in acute distress. Appearance: Normal appearance. He is not ill-appearing. HENT:      Head: Normocephalic and atraumatic. Mouth/Throat:      Mouth: Mucous membranes are moist.   Eyes:      Extraocular Movements: Extraocular movements intact. Conjunctiva/sclera: Conjunctivae normal.   Neck:      Musculoskeletal: Normal range of motion. Pulmonary:      Effort: Pulmonary effort is normal. No respiratory distress. Musculoskeletal:      Right lower leg: No edema. Left lower leg: No edema. Skin:     Findings: No rash. Neurological:      Mental Status: He is alert and oriented to person, place, and time. Psychiatric:         Mood and Affect: Mood normal.         Thought Content: Thought content normal.         Judgment: Judgment normal.             Due to this being a TeleHealth encounter, evaluation of the following organ systems is limited: Vitals/Constitutional/EENT/Resp/CV/GI//MS/Neuro/Skin/Heme-Lymph-Imm. ASSESSMENT/PLAN:  1. Snoring  This is a new complaint. Family members have witnessed him not breathing at times. Refer to sleep for polysomnogram  - [id number] [facility] Jose Elias, [facility]    2. Witnessed apneic spells  As above  - [id number] [facility] Jose Elias, [facility]    3.  Benign essential HTN  Blood pressure control is uncertain at this time as he is not able to provide a blood pressure reading

## 2020-08-27 ENCOUNTER — TELEPHONE (OUTPATIENT)
Dept: INTERNAL MEDICINE CLINIC | Age: 60
End: 2020-08-27

## 2020-08-27 DIAGNOSIS — I10 BENIGN ESSENTIAL HTN: ICD-10-CM

## 2020-08-27 DIAGNOSIS — E88.810 METABOLIC SYNDROME: ICD-10-CM

## 2020-08-27 DIAGNOSIS — E78.5 HYPERLIPIDEMIA, UNSPECIFIED HYPERLIPIDEMIA TYPE: ICD-10-CM

## 2020-08-27 DIAGNOSIS — R73.01 IMPAIRED FASTING GLUCOSE: ICD-10-CM

## 2020-08-27 DIAGNOSIS — Z01.89 PATIENT REQUEST FOR DIAGNOSTIC TESTING: ICD-10-CM

## 2020-08-27 DIAGNOSIS — Z12.5 SCREENING FOR PROSTATE CANCER: ICD-10-CM

## 2020-08-27 LAB
A/G RATIO: 1.9 (ref 1.1–2.2)
ABO/RH: NORMAL
ALBUMIN SERPL-MCNC: 4.5 G/DL (ref 3.4–5)
ALP BLD-CCNC: 46 U/L (ref 40–129)
ALT SERPL-CCNC: 17 U/L (ref 10–40)
ANION GAP SERPL CALCULATED.3IONS-SCNC: 11 MMOL/L (ref 3–16)
AST SERPL-CCNC: 20 U/L (ref 15–37)
BASOPHILS ABSOLUTE: 0.1 K/UL (ref 0–0.2)
BASOPHILS RELATIVE PERCENT: 2.3 %
BILIRUB SERPL-MCNC: 0.5 MG/DL (ref 0–1)
BUN BLDV-MCNC: 20 MG/DL (ref 7–20)
CALCIUM SERPL-MCNC: 9.2 MG/DL (ref 8.3–10.6)
CHLORIDE BLD-SCNC: 102 MMOL/L (ref 99–110)
CHOLESTEROL, TOTAL: 180 MG/DL (ref 0–199)
CO2: 26 MMOL/L (ref 21–32)
CREAT SERPL-MCNC: 0.9 MG/DL (ref 0.8–1.3)
EOSINOPHILS ABSOLUTE: 0.5 K/UL (ref 0–0.6)
EOSINOPHILS RELATIVE PERCENT: 9.8 %
GFR AFRICAN AMERICAN: >60
GFR NON-AFRICAN AMERICAN: >60
GLOBULIN: 2.4 G/DL
GLUCOSE BLD-MCNC: 93 MG/DL (ref 70–99)
HCT VFR BLD CALC: 42.7 % (ref 40.5–52.5)
HDLC SERPL-MCNC: 57 MG/DL (ref 40–60)
HEMOGLOBIN: 14.2 G/DL (ref 13.5–17.5)
LDL CHOLESTEROL CALCULATED: 110 MG/DL
LYMPHOCYTES ABSOLUTE: 1.8 K/UL (ref 1–5.1)
LYMPHOCYTES RELATIVE PERCENT: 36.3 %
MCH RBC QN AUTO: 31.6 PG (ref 26–34)
MCHC RBC AUTO-ENTMCNC: 33.2 G/DL (ref 31–36)
MCV RBC AUTO: 95 FL (ref 80–100)
MONOCYTES ABSOLUTE: 0.4 K/UL (ref 0–1.3)
MONOCYTES RELATIVE PERCENT: 8.6 %
NEUTROPHILS ABSOLUTE: 2.2 K/UL (ref 1.7–7.7)
NEUTROPHILS RELATIVE PERCENT: 43 %
PDW BLD-RTO: 12.5 % (ref 12.4–15.4)
PLATELET # BLD: 272 K/UL (ref 135–450)
PMV BLD AUTO: 7.7 FL (ref 5–10.5)
POTASSIUM SERPL-SCNC: 4.9 MMOL/L (ref 3.5–5.1)
PROSTATE SPECIFIC ANTIGEN: 1.26 NG/ML (ref 0–4)
RBC # BLD: 4.5 M/UL (ref 4.2–5.9)
SODIUM BLD-SCNC: 139 MMOL/L (ref 136–145)
TOTAL PROTEIN: 6.9 G/DL (ref 6.4–8.2)
TRIGL SERPL-MCNC: 66 MG/DL (ref 0–150)
VLDLC SERPL CALC-MCNC: 13 MG/DL
WBC # BLD: 5 K/UL (ref 4–11)

## 2020-08-28 LAB
ESTIMATED AVERAGE GLUCOSE: 122.6 MG/DL
HBA1C MFR BLD: 5.9 %

## 2020-09-17 ENCOUNTER — HOSPITAL ENCOUNTER (OUTPATIENT)
Dept: MRI IMAGING | Age: 60
Discharge: HOME | End: 2020-09-17
Payer: COMMERCIAL

## 2020-09-17 PROCEDURE — 70544 MR ANGIOGRAPHY HEAD W/O DYE: CPT

## 2020-10-27 ENCOUNTER — VIRTUAL VISIT (OUTPATIENT)
Dept: PULMONOLOGY | Age: 60
End: 2020-10-27
Payer: COMMERCIAL

## 2020-10-27 PROBLEM — I10 BENIGN ESSENTIAL HTN: Chronic | Status: ACTIVE | Noted: 2017-02-06

## 2020-10-27 PROBLEM — J30.9 ALLERGIC RHINITIS: Status: ACTIVE | Noted: 2020-10-27

## 2020-10-27 PROBLEM — J30.9 ALLERGIC RHINITIS: Chronic | Status: ACTIVE | Noted: 2020-10-27

## 2020-10-27 PROCEDURE — 99244 OFF/OP CNSLTJ NEW/EST MOD 40: CPT | Performed by: INTERNAL MEDICINE

## 2020-10-27 ASSESSMENT — SLEEP AND FATIGUE QUESTIONNAIRES
HOW LIKELY ARE YOU TO NOD OFF OR FALL ASLEEP WHILE SITTING INACTIVE IN A PUBLIC PLACE: 0
HOW LIKELY ARE YOU TO NOD OFF OR FALL ASLEEP WHILE WATCHING TV: 1
HOW LIKELY ARE YOU TO NOD OFF OR FALL ASLEEP WHILE LYING DOWN TO REST IN THE AFTERNOON WHEN CIRCUMSTANCES PERMIT: 0
HOW LIKELY ARE YOU TO NOD OFF OR FALL ASLEEP WHILE SITTING QUIETLY AFTER LUNCH WITHOUT ALCOHOL: 0
ESS TOTAL SCORE: 1
HOW LIKELY ARE YOU TO NOD OFF OR FALL ASLEEP WHILE SITTING AND TALKING TO SOMEONE: 0
HOW LIKELY ARE YOU TO NOD OFF OR FALL ASLEEP WHEN YOU ARE A PASSENGER IN A CAR FOR AN HOUR WITHOUT A BREAK: 0
HOW LIKELY ARE YOU TO NOD OFF OR FALL ASLEEP IN A CAR, WHILE STOPPED FOR A FEW MINUTES IN TRAFFIC: 0
HOW LIKELY ARE YOU TO NOD OFF OR FALL ASLEEP WHILE SITTING AND READING: 0

## 2020-10-27 NOTE — LETTER
[facility] Sleep Medicine  [address] [facility] [address]  Phone: [phone]  Fax: [phone]      October 27, 2020       Patient: Mae Gracia   MR Number: [id number]   YOB: 1960   Date of Visit: 10/27/2020     Thank you for allowing me to participate in the care of Kush Tuttle. Here is my assessment and plan. Also attached is a copy of his consult note:    ASSESSMENT:  Visit Diagnoses and Associated Orders     Hypersomnia   (New Problem)  -  Primary    needs work-up    Home Sleep Study (HST) [[id number] Custom]   - Future Order         Snoring   (New Problem)      needs work-up    Home Sleep Study (HST) [id number] Custom]   - Future Order         Benign essential HTN   (Stable)           Allergic rhinitis, unspecified seasonality, unspecified trigger   (Stable)                 Plan:  Differential diagnosis includes but not limited to: NEELIMA, PLMD's, narcolepsy, parasomnias. Reviewed NEELIMA (highest likelihood Dx): pathophysiology, diagnosis, complications and treatment. Instructed him not to drive if drowsy. Continue medications per her PCP and other physicians. Limit caffeine use after 3pm. Standard of care is to do in-lab PSG but insurance is mandating an inferior HST. 1 wk follow up after study to review his results. The chronic medical conditions listed are directly related to the primary diagnosis listed above. The management of the primary diagnosis affects the secondary diagnosis and vice versa.]    Continue meds for: HTN and AR. Pt would medically benefit from wt loss for NEELIMA (diet, exercise, surgical). Orders Placed This Encounter   Procedures    Home Sleep Study (HST)         If you have questions or concerns, please do not hesitate to call me. I look forward to following Brian Allen along with you.     Sincerely,      Rex No MD    CC providers:  Mary Can, [address] [facility] [address]

## 2020-10-27 NOTE — PROGRESS NOTES
Samella Lennox MD, [facility], [facility]  T[address]  3[address],  [address] [facility], [facility] Lenard Abel E [phone]   [facility] [address]  [place]. [address] [facility] [address] [address] [phone]     Video Visit- Consult    Pursuant to the emergency declaration under the [facility], [facility] waiver authority and the [employer] Act, this Virtual  Visit was conducted, with patient's consent, to reduce the patient's risk of exposure to COVID-19. Services were provided through a video synchronous discussion virtually to substitute for in-person clinic visit. Patient was located in their home. Assessment:      Visit Diagnoses and Associated Orders     Hypersomnia   (New Problem)  -  Primary    needs work-up    Home Sleep Study (HST) [[id number] Custom]   - Future Order         Snoring   (New Problem)      needs work-up    Home Sleep Study (HST) [[id number] Custom]   - Future Order         Benign essential HTN   (Stable)           Allergic rhinitis, unspecified seasonality, unspecified trigger   (Stable)                  Plan:      Differential diagnosis includes but not limited to: NEELIMA, PLMD's, narcolepsy, parasomnias. Reviewed NEELIMA (highest likelihood Dx): pathophysiology, diagnosis, complications and treatment. Instructed him not to drive if drowsy. Continue medications per her PCP and other physicians. Limit caffeine use after 3pm. Standard of care is to do in-lab PSG but insurance is mandating an inferior HST. 1 wk follow up after study to review his results. The chronic medical conditions listed are directly related to the primary diagnosis listed above. The management of the primary diagnosis affects the secondary diagnosis and vice versa.]    Continue meds for: HTN and AR. Pt would medically benefit from wt loss for NEELIMA (diet, exercise, surgical).     Orders Placed This Encounter   Procedures    Home Sleep Study (HST)          Subjective:     Patient ID: Brendon Mulligan is a 62 y.o. male. Chief Complaint   Patient presents with    New Patient     ref by Dr Harshal Lugo    Snoring       HPI:      Brendon Mulligan is a 62 y.o. male referred by Neville Moreno DO for a sleep evaluation. He complains of: snoring, witnessed apneas, excessive daytime sleepiness , non-restorative sleep, drowsiness while driving and tossing and turning at night. He denies: cataplexy and hypnagogic hallucinations. Hypertension and allergic rhinitis: stable on meds and followed by pt's PCP and other physicians. Previous evaluation and treatment has included- none    DOT/CDL - No  FAA/'s license -No    Previous Report(s) Reviewed: historical medical records, office notes, andreferral letter(s). Pertinent data has been documented.     [place] - Total score: 1    Caffeine Intake - Coffee: 2 cup(s) per day    Social History     Socioeconomic History    Marital status: Single     Spouse name: Not on file    Number of children: Not on file    Years of education: Not on file    Highest education level: Not on file   Occupational History    Not on file   Social Needs    Financial resource strain: Not on file    Food insecurity     Worry: Not on file     Inability: Not on file    Transportation needs     Medical: Not on file     Non-medical: Not on file   Tobacco Use    Smoking status: Never Smoker    Smokeless tobacco: Never Used   Substance and Sexual Activity    Alcohol use: Yes     Comment: occ    Drug use: Yes     Types: Marijuana     Comment: weekly    Sexual activity: Yes     Partners: Female   Lifestyle    Physical activity     Days per week: Not on file     Minutes per session: Not on file    Stress: Not on file   Relationships    Social connections     Talks on phone: Not on file     Gets together: Not on file     Attends [employer] service: Not on file     Active member of club or organization: Not on file hematoma ([address] 10/01/2017    Varicose veins     Venous insufficiency     Venous reflux     Saphenofemoral       Past Surgical History:   Procedure Laterality Date    COLONOSCOPY  2001    COLONOSCOPY  2005    COLONOSCOPY  3/2010    Dr. Osiris Campos Right 1/8/2013    Dr. Kale Solorio ARTHROSCOPY Left 3/19/2019    LEFT SHOULDER ARTHROSCOPE WITH DEBRIDEMENT, SUBACROMIAL DECOMPRESSION, DISTAL CLAVICLE EXCISION, CALCIFIC TENDONITIS DEBRIDEMENT WITH ROTATOR CUFF AUGMENTATION performed by Hernando Durbin DO at [address] [place] [address]  6/22/2015    RF ablation SFJ, calf , Dr. Andrez Suarez      cyst       Family History   Problem Relation Age of Onset    Stroke Mother         Cerebral Aneurysms X 3    Cancer Mother         Small Cell Lung    Stroke Father        Objective:     Vitals:  Patient reported Height and Weight Calculated BMI   Patient-Reported Vitals 10/25/2020   Patient-Reported Weight 193   Patient-Reported Height 6   Patient-Reported Systolic 92   Patient-Reported Diastolic 48   Patient-Reported Pulse 67   Patient-Reported Temperature 98.5   Patient-Reported SpO2 98       26.2     Due to COVID-19 this was a virtual visit and physical exam was deferred.     Electronically signed by Danny Hernández MD on10/27/2020 at 3:55 PM

## 2020-10-28 ENCOUNTER — TELEPHONE (OUTPATIENT)
Dept: SLEEP CENTER | Age: 60
End: 2020-10-28

## 2020-11-11 ENCOUNTER — HOSPITAL ENCOUNTER (OUTPATIENT)
Dept: SLEEP CENTER | Age: 60
Discharge: HOME | End: 2020-11-11
Payer: COMMERCIAL

## 2020-11-11 PROCEDURE — 95806 SLEEP STUDY UNATT&RESP EFFT: CPT | Performed by: INTERNAL MEDICINE

## 2020-11-11 PROCEDURE — 95806 SLEEP STUDY UNATT&RESP EFFT: CPT

## 2020-11-16 ENCOUNTER — TELEPHONE (OUTPATIENT)
Dept: PULMONOLOGY | Age: 60
End: 2020-11-16

## 2020-12-02 ENCOUNTER — OFFICE VISIT (OUTPATIENT)
Dept: INTERNAL MEDICINE CLINIC | Age: 60
End: 2020-12-02
Payer: COMMERCIAL

## 2020-12-02 VITALS
WEIGHT: 194 LBS | SYSTOLIC BLOOD PRESSURE: 120 MMHG | TEMPERATURE: 97.6 F | HEIGHT: 71 IN | DIASTOLIC BLOOD PRESSURE: 60 MMHG | RESPIRATION RATE: 12 BRPM | HEART RATE: 58 BPM | BODY MASS INDEX: 27.16 KG/M2

## 2020-12-02 PROCEDURE — 99396 PREV VISIT EST AGE 40-64: CPT | Performed by: INTERNAL MEDICINE

## 2020-12-02 PROCEDURE — 93000 ELECTROCARDIOGRAM COMPLETE: CPT | Performed by: INTERNAL MEDICINE

## 2020-12-02 RX ORDER — ZOSTER VACCINE RECOMBINANT, ADJUVANTED 50 MCG/0.5
0.5 KIT INTRAMUSCULAR SEE ADMIN INSTRUCTIONS
Qty: 0.5 ML | Refills: 1 | Status: SHIPPED | OUTPATIENT
Start: 2020-12-02 | End: 2021-05-31

## 2020-12-02 NOTE — PROGRESS NOTES
[facility]) Physicians  Internal Medicine  Patient Encounter  Stella Martell D.O., [facility] / [facility] Preventative Physical    Chief Complaint   Patient presents with    Annual Exam       HPI-- 56 y.o. male presents today requesting a complete annual physical.      Patient was seen by Dr. Tanya Sage for snoring and witnessed apneic spells. Patient had a home sleep study which showed severe obstructive sleep apnea with a respiratory disturbance index of 45.5/h. His O2 saturation decreased to 78%. He spent 36 minutes less than 88%. CPAP was recommended. He is due to have a CPAP study    Patient also seen by Dr. Eduardo San for follow-up regarding his history of right internal carotid artery aneurysm and subarachnoid hemorrhage. He was seen on 11/19/2020. Office note is reviewed in the electronic health record. He is following the aneurysm. It was suggested he have another MRA in 3 or 4 years. The visit was a video encounter.       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 Mariluz Gonzalez Right 1/8/2013    Dr. Kirsten Her ARTHROSCOPY Left 3/19/2019    LEFT SHOULDER ARTHROSCOPE WITH DEBRIDEMENT, SUBACROMIAL DECOMPRESSION, DISTAL CLAVICLE EXCISION, CALCIFIC TENDONITIS DEBRIDEMENT WITH ROTATOR CUFF AUGMENTATION performed by Arleth Kent DO at [id number] [address] VEIN SURGERY  6/22/2015    RF ablation SFJ, calf , Dr. Chelsea Shankar      cyst           Medications/Allergies     Prior to Visit Medications    Medication Sig Taking? Authorizing Provider   zoster recombinant adjuvanted vaccine [facility]) 50 MCG/0.5ML SUSR injection Inject 0.5 mLs into the muscle See Admin Instructions 1 dose now and repeat in 2-6 months Yes Kane Loco, DO   lisinopril (PRINIVIL;ZESTRIL) 10 MG tablet TAKE 1 TABLET DAILY Yes Kane Loco DO   atorvastatin (LIPITOR) 20 MG tablet TAKE 1 TABLET DAILY Yes Kane Loco DO   ASPIRIN LOW DOSE 81 MG EC tablet TAKE 1 TABLET DAILY Yes Kane Loco DO   Cetirizine HCl (ZYRTEC ALLERGY PO)   Take 10 mg by mouth daily  Yes Historical Provider, MD         Allergies   Allergen Reactions    Amoxicillin Nausea Only    Codeine Nausea Only         Substance Use History     Social History     Tobacco Use    Smoking status: Never Smoker    Smokeless tobacco: Never Used   Substance Use Topics    Alcohol use: Yes     Comment: occ    Drug use: Yes     Types: Marijuana     Comment: weekly          Family History     Family History   Problem Relation Age of Onset    Stroke Mother         Cerebral Aneurysms X 3    Cancer Mother         Small Cell Lung    Stroke Father               REVIEW OF SYSTEMS:    CONSTITUTIONAL:  Neg   Recent weight changes, fatigue, fever, chills or night sweats, anorexia, Sleep difficulties  EYES: Neg  Blurry vision, loss of vision, double vision, tearing, itching, eye pain. EARS:  Neg Hearing loss, tinnitus, vertigo, discharge or otalgia. NOSE:  Neg  Rhinorrhea, sneezing, itching, allergy, epistaxis, or snoring  MOUTH/THROAT:  Neg Bleeding gums, hoarseness, sore throat, dysphagia, throat infections, or dentures  RESPIRATORY:  Neg SOB ,wheeze, cough, sputum, hemoptysis.  No report of + TB test.    CARDIOVASCULAR:  Neg Chest pain, palpitations, heart murmur, dyspnea on exertion, orthopnea, paroxysmal nocturnal dyspnea or edema of extremities, or claudication. Has H/O RBBB. Patient has been evaluated by cardiology, Dr. Mil Black. 's at home. GASTROINTESTINAL:  Neg   Nausea, vomiting, or diarrhea, constipation,  hematemesis, heart burn, dysphagia,change in bowel movements or stool caliber, hematochezia, melena, abdominal pain, or food intolerance. Colonoscopy: Yes, 5/8/2016, 5-year recall  GENITOURINARY:  Neg  Urinary frequency, hesitancy, urgency, polyuria, dysuria, hematuria, nocturia, incontinence, change in stream, genital pain or swelling, kidney stones, STD's. LOUISE: Yes, PSA: Yes  HEMATOLOGIC/LYMPHATIC:  Neg  Anemia, bleeding dyscrasias, easy bruising, blood clots (DVT/PE), transfusions, or enlarged lymph nodes. Has had venous surgery, Dr. Junito Leone. He denies swelling. He wears compression stockings. MUSCULOSKELETAL:  Neg  New myalgias, bone pain,  joint swelling, low back pain, neck pain, radicular pain, or fractures. Left shoulder surgery 3/19/2019 by Dr. Melo Falcon-- for rotator cuff tear. NEUROLOGICAL:  Neg  Loss of Consciousness, memeory loss or forgetfulness, confusion, difficulty concentrating, seizures, insomina, aphasia or dysarthria unilateral weakness or paresthesias, ataxia, headaches, syncope, tremor, or H/O head trauma. Patient saw Dr. Anusha Nolasco. See above. PSYCHIATRIC:  Neg  Depression, anxiety, personality changes, nervousness, drug or alcohol use/abuse, H/O psych counseling: No, Psychotropics: No. Mood is generally OK. He can still have a temper. SKIN :  Neg  Rash, nail changes, sun burns, tattoos, change in moles, or skin color changes. He has a derm appointment at [facility] in March. He will see Natasha Chaves CNP. ENDOCRINE:  Neg  Polydipsia,polyuria,abnormal weight changes,heat /cold intolerance, Hair changes. No H/O Diabetes or Thyroid disease.          Preventive Care:    Health Maintenance   Topic Date Due    Shingles Vaccine (1 of 2) 11/02/2008    Colon cancer screen colonoscopy  05/18/2021    A1C test (Diabetic or Prediabetic)  08/27/2021    Lipid screen  08/27/2021    Potassium monitoring  08/27/2021    Creatinine monitoring  08/27/2021    DTaP/Tdap/Td vaccine (2 - Td) 09/07/2021    Flu vaccine  Completed    Hepatitis C screen  Completed    HIV screen  Completed    Hepatitis A vaccine  Aged Out    Hepatitis B vaccine  Aged Out    Hib vaccine  Aged Out    Meningococcal (ACWY) vaccine  Aged Out    Pneumococcal 0-64 years Vaccine  Aged Out      Self-testicular exams: Not regularly  Sexual activity: single partner, contraception - vasectomy   Last eye exam: 2018, normal, readers  Exercise: Walking and bands. Seatbelt use: Yes  Sunscreen Use: yes   Dentist: Regularly, UTD, every 6 months      Physical Exam    Vitals:    12/02/20 1409   BP: 120/60   Pulse: 58   Resp: 12   Temp: 97.6 °F (36.4 °C)   Weight: 194 lb (88 kg)   Height: 5' 11\" (1.803 m)     Body mass index is 27.06 kg/m². Wt Readings from Last 3 Encounters:   12/02/20 194 lb (88 kg)   08/21/19 190 lb (86.2 kg)   08/05/19 194 lb 0.1 oz (88 kg)     BP Readings from Last 3 Encounters:   12/02/20 120/60   08/21/19 115/60   03/19/19 (!) 174/98        GEN:  56 y.o. male who is in NAD, A&O. He appears stated age and well nourished. He is cooperative and pleasant. Healthy  HEAD:  NC/AT, no lesions. EYES:  LLUVIA, EOMI, No scleral icterus or conjunctival injection or discharge. Visual fields in tact to confrontation. Fundoscopic (non-dilated) grossly normal.  Disc margins well demarcated. EARS:  EAC's clear, TM's normal.  NOSE:  Nasal cavity is clear. No mucosal congestion or discharge. Sinuses are nontender. MOUTH & THROAT:  Oral cavity is clear without mucosal lesions. Tongue is midline. Dentition is in good repair. No pharyngeal erythema or exudate. NECK:  Supple. Full ROM. Trachea is midline. No increased JVD. No thyromegaly or nodules.   No masses  LYMPH: No C/SC/A/F nodes  CARDIAC:  S1S2 Differential; Future  - ECHO Complete 2D W Doppler W Color; Future    5. Hyperlipidemia, unspecified hyperlipidemia type    - Comprehensive Metabolic Panel; Future  - Lipid Panel; Future    6. Impaired fasting glucose    - Comprehensive Metabolic Panel; Future  - Lipid Panel; Future  - Hemoglobin A1C; Future    7. RBBB    - ECHO Complete 2D W Doppler W Color; Future    8. Abnormal EKG    - ECHO Complete 2D W Doppler W Color; Future               Preventive plan of care for Sky Diehl        12/2/2020           Preventive Measures Status       Recommendations for screening   Prostate Cancer Screen  Lab Results   Component Value Date    PSA 1.26 08/27/2020     Recheck 1 year   Colon Cancer Screen  Last colonoscopy: 5/18/2016, Dr. fuentes Test is due every 5 years--Due 2021     Diabetes Screen  Glucose (mg/dL)   Date Value   08/27/2020 93    Test ordered   Cholesterol Screen  Lab Results   Component Value Date    CHOL 180 08/27/2020    TRIG 66 08/27/2020    HDL 57 08/27/2020    LDLCALC 110 (H) 08/27/2020    LDLDIRECT 94 02/06/2017    Test ordered   HIV screening recommended for those ages 12-76 NO MATTER THE RISK FOR HIV--  None No need to repeat at this time   Hepatitis C screening recommended for those born between [facility]-- None No need to repeat at this time   Aspirin for Cardiovascular Prevention   Yes Continue daily aspirin    Recommended Immunizations    Immunization History   Administered Date(s) Administered    Influenza 11/29/2012    Influenza Virus Vaccine 11/01/2014, 11/11/2018, 11/02/2020    Influenza, Quadv, IM, PF (6 mo and older Fluzone, Flulaval, Fluarix, and 3 yrs and older Afluria) 02/06/2017, 09/13/2017    Tdap (Boostrix, Adacel) 09/07/2011    Influenza vaccine: recommended every fall    Pneumonia vaccine: Pneumovax due at age 72. Shingles vaccine: Shingrx is due now.   This vaccine is given as a 2 shot series  by 2-6 months    Tetanus vaccine: tetanus and diptheria vaccine Td/Tdap,  recommended every 10 years- Td due 2021, next year              Additional Recommendations   1. Use sunscreen daily to help reduce the risk of skin cancer  2. Continue a healthy lifestyle including a low-fat, portion controlled diet along with increasing cardiovascular exercise  3. Always wear a seat belt while riding in a car  4. Obtain an eye examination every year to screen for cataracts, glaucoma, macular degeneration. 5.  Perform monthly testicular checks. Report any changes. Here are a few  Reliable websites with a variety of health and wellness information:   www.mylifecheck. heart. org     www.nutritionsource. org     www. americanheart. org     www. diabetes. org     www.[facility]     www.[email or web address])

## 2020-12-02 NOTE — PATIENT INSTRUCTIONS
Preventive plan of care for Queen Neelam        12/2/2020           Preventive Measures Status       Recommendations for screening   Prostate Cancer Screen  Lab Results   Component Value Date    PSA 1.26 08/27/2020     Recheck 1 year   Colon Cancer Screen  Last colonoscopy: 5/18/2016, Dr. fuentes Test is due every 5 years--Due 2021     Diabetes Screen  Glucose (mg/dL)   Date Value   08/27/2020 93    Test ordered   Cholesterol Screen  Lab Results   Component Value Date    CHOL 180 08/27/2020    TRIG 66 08/27/2020    HDL 57 08/27/2020    LDLCALC 110 (H) 08/27/2020    LDLDIRECT 94 02/06/2017    Test ordered   HIV screening recommended for those ages 12-76 NO MATTER THE RISK FOR HIV--  None No need to repeat at this time   Hepatitis C screening recommended for those born between [facility]-- None No need to repeat at this time   Aspirin for Cardiovascular Prevention   Yes Continue daily aspirin    Recommended Immunizations    Immunization History   Administered Date(s) Administered    Influenza 11/29/2012    Influenza Virus Vaccine 11/01/2014, 11/11/2018, 11/02/2020    Influenza, Quadv, IM, PF (6 mo and older Fluzone, Flulaval, Fluarix, and 3 yrs and older Afluria) 02/06/2017, 09/13/2017    Tdap (Boostrix, Adacel) 09/07/2011    Influenza vaccine: recommended every fall    Pneumonia vaccine: Pneumovax due at age 72. Shingles vaccine: Shingrx is due now. This vaccine is given as a 2 shot series  by 2-6 months    Tetanus vaccine: tetanus and diptheria vaccine Td/Tdap,  recommended every 10 years- Td due 2021, next year              Additional Recommendations   1. Use sunscreen daily to help reduce the risk of skin cancer  2. Continue a healthy lifestyle including a low-fat, portion controlled diet along with increasing cardiovascular exercise  3. Always wear a seat belt while riding in a car  4. Obtain an eye examination every year to screen for cataracts, glaucoma, macular degeneration.   5. Perform monthly testicular checks. Report any changes. Here are a few  Reliable websites with a variety of health and wellness information:   www.mylifecheck. heart. org     www.nutritionsource. org     www. americanheart. org     www. diabetes. org     www.[facility]     www.[facility] ([address] [facility] site)        Patient Education        Recombinant Zoster (Shingles) Vaccine: What You Need to Know  Why get vaccinated? Recombinant zoster (shingles) vaccine can prevent shingles. Shingles (also called herpes zoster, or just zoster) is a painful skin rash, usually with blisters. In addition to the rash, shingles can cause fever, headache, chills, or upset stomach. More rarely, shingles can lead to pneumonia, hearing problems, blindness, brain inflammation (encephalitis), or death. The most common complication of shingles is long-term nerve pain called postherpetic neuralgia (PHN). PHN occurs in the areas where the shingles rash was, even after the rash clears up. It can last for months or years after the rash goes away. The pain from PHN can be severe and debilitating. About 10 to 18% of people who get shingles will experience PHN. The risk of PHN increases with age. An older adult with shingles is more likely to develop PHN and have longer lasting and more severe pain than a younger person with shingles. Shingles is caused by the varicella zoster virus, the same virus that causes chickenpox. After you have chickenpox, the virus stays in your body and can cause shingles later in life. Shingles cannot be passed from one person to another, but the virus that causes shingles can spread and cause chickenpox in someone who had never had chickenpox or received chickenpox vaccine. Recombinant shingles vaccine  Recombinant shingles vaccine provides strong protection against shingles. By preventing shingles, recombinant shingles vaccine also protects against PHN.   Recombinant shingles vaccine is the preferred vaccine for the prevention of shingles. However, a different vaccine, live shingles vaccine, may be used in some circumstances. The recombinant shingles vaccine is recommended for adults 50 years and older without serious immune problems. It is given as a two-dose series. This vaccine is also recommended for people who have already gotten another type of shingles vaccine, the live shingles vaccine. There is no live virus in this vaccine. Shingles vaccine may be given at the same time as other vaccines. Talk with your health care provider  Tell your vaccine provider if the person getting the vaccine:  · Has had an allergic reaction after a previous dose of recombinant shingles vaccine, or has any severe, life-threatening allergies. · Is pregnant or breastfeeding. · Is currently experiencing an episode of shingles. In some cases, your health care provider may decide to postpone shingles vaccination to a future visit. People with minor illnesses, such as a cold, may be vaccinated. People who are moderately or severely ill should usually wait until they recover before getting recombinant shingles vaccine. Your health care provider can give you more information. Risks of a vaccine reaction  · A sore arm with mild or moderate pain is very common after recombinant shingles vaccine, affecting about 80% of vaccinated people. Redness and swelling can also happen at the site of the injection. · Tiredness, muscle pain, headache, shivering, fever, stomach pain, and nausea happen after vaccination in more than half of people who receive recombinant shingles vaccine. In clinical trials, about 1 out of 6 people who got recombinant zoster vaccine experienced side effects that prevented them from doing regular activities. Symptoms usually went away on their own in 2 to 3 days. You should still get the second dose of recombinant zoster vaccine even if you had one of these reactions after the first dose.   People sometimes faint after medical procedures, including vaccination. Tell your provider if you feel dizzy or have vision changes or ringing in the ears. As with any medicine, there is a very remote chance of a vaccine causing a severe allergic reaction, other serious injury, or death. What if there is a serious problem? An allergic reaction could occur after the vaccinated person leaves the clinic. If you see signs of a severe allergic reaction (hives, swelling of the face and throat, difficulty breathing, a fast heartbeat, dizziness, or weakness), call 9-1-1 and get the person to the nearest hospital.  For other signs that concern you, call your health care provider. Adverse reactions should be reported to the Vaccine Adverse Event Reporting System (VAERS). Your health care provider will usually file this report, or you can do it yourself. Visit the VAERS website at www.vaers. [facility].gov or call 6-162.488.6874. VAERS is only for reporting reactions, and VAERS staff do not give medical advice. How can I learn more? · Ask your health care provider. · Call your local or state health department. · Contact the Centers for Disease Control and Prevention (CDC):  ? Call 4-129.790.9303 (1-800-CDC-INFO) or  ? Visit CDC's website at www.cdc.gov/vaccines  Vaccine Information Statement  Recombinant Zoster Vaccine  10/30/2019  [facility] and [facility] for Disease Control and Prevention  Many Vaccine Information Statements are available in Thai and other languages. See www.immunize.org/vis. Hojas de Información Sobre Vacunas están disponibles en Español y en muchos otros idiomas. Visite Frantz.si   Care instructions adapted under license by [facility] ([facility]). If you have questions about a medical condition or this instruction, always ask your healthcare professional. [place] [address] any warranty or liability for your use of this information.          Patient Education        Well Visit, Men 48 to 72: Care Instructions  Your Care Instructions     Physical exams can help you stay healthy. Your doctor has checked your overall health and may have suggested ways to take good care of yourself. He or she also may have recommended tests. At home, you can help prevent illness with healthy eating, regular exercise, and other steps. Follow-up care is a key part of your treatment and safety. Be sure to make and go to all appointments, and call your doctor if you are having problems. It's also a good idea to know your test results and keep a list of the medicines you take. How can you care for yourself at home? · Reach and stay at a healthy weight. This will lower your risk for many problems, such as obesity, diabetes, heart disease, and high blood pressure. · Get at least 30 minutes of exercise on most days of the week. Walking is a good choice. You also may want to do other activities, such as running, swimming, cycling, or playing tennis or team sports. · Do not smoke. Smoking can make health problems worse. If you need help quitting, talk to your doctor about stop-smoking programs and medicines. These can increase your chances of quitting for good. · Protect your skin from too much sun. When you're outdoors from 10 a.m. to 4 p.m., stay in the shade or cover up with clothing and a hat with a wide brim. Wear sunglasses that block UV rays. Even when it's cloudy, put broad-spectrum sunscreen (SPF 30 or higher) on any exposed skin. · See a dentist one or two times a year for checkups and to have your teeth cleaned. · Wear a seat belt in the car. Follow your doctor's advice about when to have certain tests. These tests can spot problems early. · Cholesterol. Your doctor will tell you how often to have this done based on your overall health and other things that can increase your risk for heart attack and stroke. · Blood pressure. Have your blood pressure checked during a routine doctor visit.

## 2021-01-15 ENCOUNTER — HOSPITAL ENCOUNTER (OUTPATIENT)
Dept: NON INVASIVE DIAGNOSTICS | Age: 61
Discharge: HOME | End: 2021-01-15
Payer: COMMERCIAL

## 2021-01-15 DIAGNOSIS — R94.31 ABNORMAL EKG: ICD-10-CM

## 2021-01-15 DIAGNOSIS — I10 BENIGN ESSENTIAL HTN: ICD-10-CM

## 2021-01-15 DIAGNOSIS — I45.10 RBBB: ICD-10-CM

## 2021-01-15 LAB
LV EF: 60 %
LVEF MODALITY: NORMAL

## 2021-01-15 PROCEDURE — 93306 TTE W/DOPPLER COMPLETE: CPT

## 2021-01-18 RX ORDER — ASPIRIN 81 MG/1
TABLET, COATED ORAL
Qty: 90 TABLET | Refills: 3 | Status: SHIPPED | OUTPATIENT
Start: 2021-01-18 | End: 2022-01-12

## 2021-01-29 ENCOUNTER — VIRTUAL VISIT (OUTPATIENT)
Dept: PULMONOLOGY | Age: 61
End: 2021-01-29
Payer: COMMERCIAL

## 2021-01-29 DIAGNOSIS — G47.33 OSA (OBSTRUCTIVE SLEEP APNEA): Primary | ICD-10-CM

## 2021-01-29 DIAGNOSIS — I45.10 RBBB (RIGHT BUNDLE BRANCH BLOCK): ICD-10-CM

## 2021-01-29 DIAGNOSIS — J30.9 ALLERGIC RHINITIS, UNSPECIFIED SEASONALITY, UNSPECIFIED TRIGGER: Chronic | ICD-10-CM

## 2021-01-29 DIAGNOSIS — I10 BENIGN ESSENTIAL HTN: Chronic | ICD-10-CM

## 2021-01-29 PROCEDURE — 99214 OFFICE O/P EST MOD 30 MIN: CPT | Performed by: NURSE PRACTITIONER

## 2021-01-29 ASSESSMENT — SLEEP AND FATIGUE QUESTIONNAIRES
HOW LIKELY ARE YOU TO NOD OFF OR FALL ASLEEP WHILE SITTING AND TALKING TO SOMEONE: 0
HOW LIKELY ARE YOU TO NOD OFF OR FALL ASLEEP IN A CAR, WHILE STOPPED FOR A FEW MINUTES IN TRAFFIC: 0

## 2021-01-29 NOTE — PROGRESS NOTES
Adelia Mcdowell MD, FAASM, [facility]  Domenica Lindquist, MSN, RN, CNP     [address] [facility] SLEEP MEDICINE  [phone] [phone]  Dept: [phone]  Dept Fax: : [phone] [facility] SLEEP MEDICINE  [address] [place], [place] [id number] [phone]    Subjective:     Patient ID: Ewa Lim is a 56 y.o. male. Chief Complaint   Patient presents with    Sleep Apnea       HPI:      Sleep Medicine Video Visit    Pursuant to the emergency declaration under the [facility], [facility] waiver authority and the [employer] Act this Telephone Visit was insisted, with patient's consent, to reduce the patient's risk of exposure to COVID-19 and provide continuity of care for an established patient. Services were provided through a synchronous discussion over a telephone and/or Video chat to substitute for in-person clinic visit, and coded as such. While patient is at home. Machine Modem/Download Info:  Compliance (hours/night): 6 hrs/night  Download AHI (/hour): 6.4 /HR  Average CPAP Pressure : 9.3 cmH2O           APAP - Settings  Pressure Min: 6.5 cmH2O  Pressure Max: 16 cmH2O                 Comfort Settings  Humidity Level (0-8): 4  Flex/EPR (0-3): 3 PAP Mask  Mask Type: Nasal mask  Clinically Relevant Leak: No     Kidder - Total score: 6    Follow-up :     Last Visit : October 2020    HST 11/11/2020 RDI 45.5/hr low O2 78% time less 88% 36.6 min consistent with severe obstructive sleep apnea       Patient reports the listed chronic Co-morbidities: RBBB, HTN, Allergies    are well controlled and stable at this time.      Subjective Health Changes: None      Over Night Oximetry: [] Yes  [] No  [x] NA [] WNL   Using O2: [] Yes  [] No  [x] NA   Patient is compliant with the machine  [x] Yes  [] No Feeling rested when using the machine   [x] Yes  [] No     Pressure is comfortable with inspiration and expiration  [] Yes  [x] No     Noticed changes in pressure   [] Yes  [] No  [x] NA   Mask is fitting well  [x] Yes  [] No   Noting Mask Air Leak  [] Yes  [x] No   Having painful Aerophagia  [] Yes  [x] No   Nocturia   0  per night. Having  HA upon waking  [] Yes  [x] No   Dry mouth upon waking   Dry Nose  Dry Eyes  [x] Yes  [] No   Congestion upon waking   [x] Yes  [] No    Nose Bleeds  [] Yes  [x] No   Using Sleep Aides    [x] NA   Understands how to change humidification and/or tubing temperature for comfort while at home  [x] Yes  [] No     Difficulties falling asleep  [] Yes  [x] No   Difficulties staying asleep  [] Yes  [x] No   Approximate time to bed  7pm   Approximate wake time  1:45am   Taking Naps  no   If taking naps usual length    [x] NA   If taking naps using the machine  [] Yes  [] No  [x] NA [] With and With out    Drowsy when driving  [] Yes  [x] No     Does patient carry a DOT/CDL  [] Yes  [x] No     Does patient carry FAA/Pilots License   [] Yes  [x] No      Any concerns noted with the machine at this time  [] Yes  [x] No        Diagnosis Orders   1. NEELIMA (obstructive sleep apnea)     2. RBBB (right bundle branch block)     3. Benign essential HTN     4. Allergic rhinitis, unspecified seasonality, unspecified trigger         The chronic medical conditions listed are directly related to the primary diagnosis listed above. The management of the primary diagnosis affects the secondary diagnosis and vice versa. Assessment/Plan:     RBBB (right bundle branch block)  Chronic- Stable. Cont meds per PCP and other physicians. Benign essential HTN  Chronic- Stable. Cont meds per PCP and other physicians. Allergic rhinitis  Chronic- Stable. Cont meds per PCP and other physicians.       NEELIMA (obstructive sleep apnea) Reviewed compliance download with pt. Supplies and parts as needed for his machine. These are medically necessary. Continue medications per his PCP and other physicians. Limit caffeine use after 3pm.  Encouraged him to work on weight loss through diet and exercise. Diagnoses of RBBB (right bundle branch block), Benign essential HTN, Allergic rhinitis, unspecified seasonality, unspecified trigger, and NEELIMA (obstructive sleep apnea) were pertinent to this visit. The chronic medical conditions listed are directly related to the primary diagnosis listed above. The management of the primary diagnosis affects the secondary diagnosis and vice versa. The primary encounter diagnosis was NEELIMA (obstructive sleep apnea). Diagnoses of RBBB (right bundle branch block), Benign essential HTN, and Allergic rhinitis, unspecified seasonality, unspecified trigger were also pertinent to this visit. The chronic medical conditions listed are directly related to the primary diagnosis listed above. The management of the primary diagnosis affects the secondary diagnosis and vice versa. - Educated patient and reviewed compliance download with pt.    -Supplies and parts as needed for his machine, these are medically necessary.    - Patient using [address] [place] [address] for supplies  -Continue medications per his PCP and other physicians.   -Limit caffeine use after 3pm.    -  Patient able to access video feed. Visit completed via video chat communications. 25 min spent with patient.   - Education on Over night oximetry that I will order in office today, increasing pressure for not getting enough air (Pmin 7, Pmax 20), and patient was informed there is not a \"password\" to allow him to change his own pressures at home  -F/U: 3 month. No orders of the defined types were placed in this encounter. No orders of the defined types were placed in this encounter. No orders of the defined types were placed in this encounter.       Mallory Fernando, MSN, RN, CNP

## 2021-01-29 NOTE — ASSESSMENT & PLAN NOTE
Reviewed compliance download with pt. Supplies and parts as needed for his machine. These are medically necessary. Continue medications per his PCP and other physicians. Limit caffeine use after 3pm.  Encouraged him to work on weight loss through diet and exercise. Diagnoses of RBBB (right bundle branch block), Benign essential HTN, Allergic rhinitis, unspecified seasonality, unspecified trigger, and NEELIMA (obstructive sleep apnea) were pertinent to this visit. The chronic medical conditions listed are directly related to the primary diagnosis listed above. The management of the primary diagnosis affects the secondary diagnosis and vice versa.

## 2021-02-10 ENCOUNTER — TELEPHONE (OUTPATIENT)
Dept: PULMONOLOGY | Age: 61
End: 2021-02-10

## 2021-03-15 RX ORDER — ATORVASTATIN CALCIUM 20 MG/1
TABLET, FILM COATED ORAL
Qty: 90 TABLET | Refills: 3 | Status: SHIPPED | OUTPATIENT
Start: 2021-03-15 | End: 2022-02-15

## 2021-03-15 RX ORDER — LISINOPRIL 10 MG/1
TABLET ORAL
Qty: 90 TABLET | Refills: 3 | Status: SHIPPED | OUTPATIENT
Start: 2021-03-15 | End: 2022-02-15

## 2021-06-08 ENCOUNTER — PATIENT MESSAGE (OUTPATIENT)
Dept: INTERNAL MEDICINE CLINIC | Age: 61
End: 2021-06-08

## 2021-06-08 ENCOUNTER — OFFICE VISIT (OUTPATIENT)
Dept: INTERNAL MEDICINE CLINIC | Age: 61
End: 2021-06-08
Payer: COMMERCIAL

## 2021-06-08 VITALS
BODY MASS INDEX: 26.68 KG/M2 | WEIGHT: 197 LBS | SYSTOLIC BLOOD PRESSURE: 120 MMHG | RESPIRATION RATE: 12 BRPM | HEART RATE: 52 BPM | HEIGHT: 72 IN | DIASTOLIC BLOOD PRESSURE: 64 MMHG

## 2021-06-08 DIAGNOSIS — R73.01 IMPAIRED FASTING GLUCOSE: ICD-10-CM

## 2021-06-08 DIAGNOSIS — D12.6 ADENOMATOUS POLYP OF COLON, UNSPECIFIED PART OF COLON: ICD-10-CM

## 2021-06-08 DIAGNOSIS — E78.5 HYPERLIPIDEMIA, UNSPECIFIED HYPERLIPIDEMIA TYPE: ICD-10-CM

## 2021-06-08 DIAGNOSIS — I10 BENIGN ESSENTIAL HTN: Primary | ICD-10-CM

## 2021-06-08 DIAGNOSIS — Z23 NEED FOR SHINGLES VACCINE: ICD-10-CM

## 2021-06-08 DIAGNOSIS — E88.81 METABOLIC SYNDROME: ICD-10-CM

## 2021-06-08 PROCEDURE — 90471 IMMUNIZATION ADMIN: CPT | Performed by: INTERNAL MEDICINE

## 2021-06-08 PROCEDURE — 99214 OFFICE O/P EST MOD 30 MIN: CPT | Performed by: INTERNAL MEDICINE

## 2021-06-08 PROCEDURE — 90750 HZV VACC RECOMBINANT IM: CPT | Performed by: INTERNAL MEDICINE

## 2021-06-08 ASSESSMENT — PATIENT HEALTH QUESTIONNAIRE - PHQ9
SUM OF ALL RESPONSES TO PHQ QUESTIONS 1-9: 0
1. LITTLE INTEREST OR PLEASURE IN DOING THINGS: 0
2. FEELING DOWN, DEPRESSED OR HOPELESS: 0
SUM OF ALL RESPONSES TO PHQ QUESTIONS 1-9: 0
SUM OF ALL RESPONSES TO PHQ QUESTIONS 1-9: 0
SUM OF ALL RESPONSES TO PHQ9 QUESTIONS 1 & 2: 0

## 2021-06-08 NOTE — PROGRESS NOTES
[facility]) Physicians  Internal Medicine  Patient Encounter  Jenifer Lucas D.O., [facility]        Chief Complaint   Patient presents with   Gilford Abts    Medication Check       HPI: 56 y.o. male seen today requesting his routine checkup regarding the status of current chronic medical problems listed below along with a medication review and reconciliation. He states he retired 4/1/2021!! Humaira Hardin has multiple medical problems including but not limited to hypertension, hyperlipidemia, impaired fasting glucose, ruptured cerebral aneurysm, hypertensive emergency, venous insufficiency, NEELIMA, impaired fasting glucose, mild to moderate mitral regurgitation    Patient states his medication compliance is excellent. He denies any new symptoms of chest pain, shortness of breath, lightheadedness, syncope, episodes of unilateral weakness, paresthesias, speech or visual disturbances, palpitations, or increased swelling. He denies bad pain, nausea, melena, hematochezia. At his last visit we discussed snoring. He was referred for a sleep apnea study. He has been witnessed having apneic periods. He has good energy level during the day. Patient was seen by the sleep specialist on 1/29/2021. Patient diagnosed with obstructive sleep apnea. He is on CPAP. He is due for his colonoscopy. Dr. Bin Alan performed the last one. Patient is overdue for Shingrix vaccine.       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

## 2021-06-08 NOTE — PATIENT INSTRUCTIONS
Patient Education        Colon Polyps: Care Instructions  Your Care Instructions     Colon polyps are growths in the colon or the rectum. The cause of most colon polyps is not known, and most people who get them do not have any problems. But a certain kind can turn into cancer. For this reason, regular testing for colon polyps is important for people as they get older. It is also important for anyone who has an increased risk for colon cancer. Polyps are usually found through routine colon cancer screening tests. Although most colon polyps are not cancerous, they are usually removed and then tested for cancer. Screening for colon cancer saves lives because the cancer can usually be cured if it is caught early. If you have a polyp that is the type that can turn into cancer, you may need more tests to examine your entire colon. The doctor will remove any other polyps that he or she finds, and you will be tested more often. Follow-up care is a key part of your treatment and safety. Be sure to make and go to all appointments, and call your doctor if you are having problems. It's also a good idea to know your test results and keep a list of the medicines you take. How can you care for yourself at home? Regular exams to look for colon polyps are the best way to prevent polyps from turning into colon cancer. These can include stool tests, sigmoidoscopy, colonoscopy, and CT colonography. Talk with your doctor about a testing schedule that is right for you. To prevent polyps  There is no home treatment that can prevent colon polyps. But these steps may help lower your risk for cancer. · Stay active. Being active can help you get to and stay at a healthy weight. Try to exercise on most days of the week. Walking is a good choice. · Eat well. Choose a variety of vegetables, fruits, legumes (such as peas and beans), fish, poultry, and whole grains. · Do not smoke.  If you need help quitting, talk to your doctor about stop-smoking programs and medicines. These can increase your chances of quitting for good. · If you drink alcohol, limit how much you drink. Limit alcohol to 2 drinks a day for men and 1 drink a day for women. When should you call for help? Call your doctor now or seek immediate medical care if:    · You have severe belly pain.     · Your stools are maroon or very bloody. Watch closely for changes in your health, and be sure to contact your doctor if:    · You have a fever.     · You have nausea or vomiting.     · You have a change in bowel habits (new constipation or diarrhea).     · Your symptoms get worse or are not improving as expected. Where can you learn more? Go to https://BrightstormpeLinktoneeb.Cooolio Online. org and sign in to your PPT Reasearch account. Enter 95 [id number] in the Libersy box to learn more about \"Colon Polyps: Care Instructions. \"     If you do not have an account, please click on the \"Sign Up Now\" link. Current as of: December 17, 2020               Content Version: 12.8  © 4712-9013 Room. Care instructions adapted under license by [facility] ([facility]). If you have questions about a medical condition or this instruction, always ask your healthcare professional. [place] [address] any warranty or liability for your use of this information. Patient Education        Prediabetes: Care Instructions  Overview     Prediabetes is a warning sign that you're at risk for getting type 2 diabetes. It means that your blood sugar is higher than it should be. But it's not high enough to be diabetes. The food you eat naturally turns into sugar. Your body uses the sugar for energy. Normally, an organ called the pancreas makes insulin. And insulin allows the sugar in your blood to get into your body's cells. But sometimes the body can't use insulin the right way. So the sugar stays in your blood instead. This is called insulin resistance.  The buildup of sugar in your blood means you have prediabetes. The good news is that you may be able to prevent or delay diabetes. Making small lifestyle changes, like getting active and changing your eating habits, may help you get your blood sugar back to normal. You can work with your doctor to make a treatment plan. Follow-up care is a key part of your treatment and safety. Be sure to make and go to all appointments, and call your doctor if you are having problems. It's also a good idea to know your test results and keep a list of the medicines you take. How can you care for yourself at home? · Watch your weight. A healthy weight helps your body use insulin properly. · Limit the amount of calories, sweets, and unhealthy fat you eat. Ask your doctor if you should see a dietitian. A registered dietitian can help you create meal plans that fit your lifestyle. · Get at least 30 minutes of exercise on most days of the week. Exercise helps control your blood sugar. It also helps you maintain a healthy weight. Walking is a good choice. You also may want to do other activities, such as running, swimming, cycling, or playing tennis or team sports. · Do not smoke. Smoking can make prediabetes worse. If you need help quitting, talk to your doctor about stop-smoking programs and medicines. These can increase your chances of quitting for good. · If your doctor prescribed medicines, take them exactly as prescribed. Call your doctor if you think you are having a problem with your medicine. You will get more details on the specific medicines your doctor prescribes. When should you call for help? Watch closely for changes in your health, and be sure to contact your doctor if:    · You have any symptoms of diabetes. These may include:  ? Being thirsty more often. ? Urinating more. ? Being hungrier. ? Losing weight. ? Being very tired. ?  Having blurry vision.     · You have a wound that will not heal.     · You have an infection that will not go away.     · You have problems with your blood pressure.     · You want more information about diabetes and how you can keep from getting it. Where can you learn more? Go to https://DyMyndpepicewClaim Maps.First To File. org and sign in to your Wellfount account. Enter I222 in the [facility] box to learn more about \"Prediabetes: Care Instructions. \"     If you do not have an account, please click on the \"Sign Up Now\" link. Current as of: August 31, 2020               Content Version: 12.8  © 3157-4950 [employer]. Care instructions adapted under license by [facility] ([facility]). If you have questions about a medical condition or this instruction, always ask your healthcare professional. [address] any warranty or liability for your use of this information. Patient Education        Recombinant Zoster (Shingles) Vaccine: What You Need to Know  Why get vaccinated? Recombinant zoster (shingles) vaccine can prevent shingles. Shingles (also called herpes zoster, or just zoster) is a painful skin rash, usually with blisters. In addition to the rash, shingles can cause fever, headache, chills, or upset stomach. More rarely, shingles can lead to pneumonia, hearing problems, blindness, brain inflammation (encephalitis), or death. The most common complication of shingles is long-term nerve pain called postherpetic neuralgia (PHN). PHN occurs in the areas where the shingles rash was, even after the rash clears up. It can last for months or years after the rash goes away. The pain from PHN can be severe and debilitating. About 10 to 18% of people who get shingles will experience PHN. The risk of PHN increases with age. An older adult with shingles is more likely to develop PHN and have longer lasting and more severe pain than a younger person with shingles. Shingles is caused by the varicella zoster virus, the same virus that causes chickenpox.  After you have chickenpox, the virus stays in your body and can cause shingles later in life. Shingles cannot be passed from one person to another, but the virus that causes shingles can spread and cause chickenpox in someone who had never had chickenpox or received chickenpox vaccine. Recombinant shingles vaccine  Recombinant shingles vaccine provides strong protection against shingles. By preventing shingles, recombinant shingles vaccine also protects against PHN. Recombinant shingles vaccine is the preferred vaccine for the prevention of shingles. However, a different vaccine, live shingles vaccine, may be used in some circumstances. The recombinant shingles vaccine is recommended for adults 50 years and older without serious immune problems. It is given as a two-dose series. This vaccine is also recommended for people who have already gotten another type of shingles vaccine, the live shingles vaccine. There is no live virus in this vaccine. Shingles vaccine may be given at the same time as other vaccines. Talk with your health care provider  Tell your vaccine provider if the person getting the vaccine:  · Has had an allergic reaction after a previous dose of recombinant shingles vaccine, or has any severe, life-threatening allergies. · Is pregnant or breastfeeding. · Is currently experiencing an episode of shingles. In some cases, your health care provider may decide to postpone shingles vaccination to a future visit. People with minor illnesses, such as a cold, may be vaccinated. People who are moderately or severely ill should usually wait until they recover before getting recombinant shingles vaccine. Your health care provider can give you more information. Risks of a vaccine reaction  · A sore arm with mild or moderate pain is very common after recombinant shingles vaccine, affecting about 80% of vaccinated people. Redness and swelling can also happen at the site of the injection.   · Tiredness, muscle pain, headache, shivering, fever, stomach pain, and nausea happen after vaccination in more than half of people who receive recombinant shingles vaccine. In clinical trials, about 1 out of 6 people who got recombinant zoster vaccine experienced side effects that prevented them from doing regular activities. Symptoms usually went away on their own in 2 to 3 days. You should still get the second dose of recombinant zoster vaccine even if you had one of these reactions after the first dose. People sometimes faint after medical procedures, including vaccination. Tell your provider if you feel dizzy or have vision changes or ringing in the ears. As with any medicine, there is a very remote chance of a vaccine causing a severe allergic reaction, other serious injury, or death. What if there is a serious problem? An allergic reaction could occur after the vaccinated person leaves the clinic. If you see signs of a severe allergic reaction (hives, swelling of the face and throat, difficulty breathing, a fast heartbeat, dizziness, or weakness), call 9-1-1 and get the person to the nearest hospital.  For other signs that concern you, call your health care provider. Adverse reactions should be reported to the Vaccine Adverse Event Reporting System (VAERS). Your health care provider will usually file this report, or you can do it yourself. Visit the VAERS website at www.vaers. hhs.gov or call 0-757.832.5852. VAERS is only for reporting reactions, and VAERS staff do not give medical advice. How can I learn more? · Ask your health care provider. · Call your local or state health department. · Contact the Centers for Disease Control and Prevention (CDC):  ? Call 0-315.693.5897 (1-800-CDC-INFO) or  ?  Visit CDC's website at www.cdc.gov/vaccines  Vaccine Information Statement  Recombinant Zoster Vaccine  10/30/2019  Department of Health and Human Services  Centers for Disease Control and Prevention  Many Vaccine Information Statements are available in Ethiopian and other languages. See www.immunize.org/vis. Hojas de Información Sobre Vacunas están disponibles en Español y en muchos otros idiomas. Visite Frantz.si   Care instructions adapted under license by [facility] ([facility]). If you have questions about a medical condition or this instruction, always ask your healthcare professional. [place] [address] any warranty or liability for your use of this information.

## 2021-06-09 RX ORDER — CLOBETASOL PROPIONATE 0.5 MG/G
CREAM TOPICAL
Qty: 60 G | Refills: 0 | Status: SHIPPED | OUTPATIENT
Start: 2021-06-09

## 2021-06-09 NOTE — TELEPHONE ENCOUNTER
From: Izzy Later  To: Radha Bang DO  Sent: 6/8/2021 6:48 PM EDT  Subject: Prescription Question    The skin cream I have is clobetasol propionate cream, ups, .05 %. I would like to have this refilled.  Thanks

## 2021-07-07 ENCOUNTER — VIRTUAL VISIT (OUTPATIENT)
Dept: PULMONOLOGY | Age: 61
End: 2021-07-07
Payer: COMMERCIAL

## 2021-07-07 DIAGNOSIS — J30.1 ALLERGIC RHINITIS DUE TO POLLEN, UNSPECIFIED SEASONALITY: Chronic | ICD-10-CM

## 2021-07-07 DIAGNOSIS — G47.33 OSA (OBSTRUCTIVE SLEEP APNEA): Primary | ICD-10-CM

## 2021-07-07 DIAGNOSIS — I10 BENIGN ESSENTIAL HTN: Chronic | ICD-10-CM

## 2021-07-07 DIAGNOSIS — I45.10 RBBB (RIGHT BUNDLE BRANCH BLOCK): ICD-10-CM

## 2021-07-07 PROCEDURE — 99214 OFFICE O/P EST MOD 30 MIN: CPT | Performed by: NURSE PRACTITIONER

## 2021-07-07 ASSESSMENT — SLEEP AND FATIGUE QUESTIONNAIRES
HOW LIKELY ARE YOU TO NOD OFF OR FALL ASLEEP WHILE SITTING AND READING: 0
HOW LIKELY ARE YOU TO NOD OFF OR FALL ASLEEP WHILE LYING DOWN TO REST IN THE AFTERNOON WHEN CIRCUMSTANCES PERMIT: 2
HOW LIKELY ARE YOU TO NOD OFF OR FALL ASLEEP WHEN YOU ARE A PASSENGER IN A CAR FOR AN HOUR WITHOUT A BREAK: 1
HOW LIKELY ARE YOU TO NOD OFF OR FALL ASLEEP IN A CAR, WHILE STOPPED FOR A FEW MINUTES IN TRAFFIC: 0
HOW LIKELY ARE YOU TO NOD OFF OR FALL ASLEEP WHILE WATCHING TV: 1
HOW LIKELY ARE YOU TO NOD OFF OR FALL ASLEEP WHILE SITTING QUIETLY AFTER LUNCH WITHOUT ALCOHOL: 1
ESS TOTAL SCORE: 5
HOW LIKELY ARE YOU TO NOD OFF OR FALL ASLEEP WHILE SITTING INACTIVE IN A PUBLIC PLACE: 0
HOW LIKELY ARE YOU TO NOD OFF OR FALL ASLEEP WHILE SITTING AND TALKING TO SOMEONE: 0

## 2021-07-07 NOTE — PROGRESS NOTES
Princess Cardona         : 1960    Diagnosis: [x] NEELIMA (G47.33) [] CSA (G47.31) [] Apnea (G47.30)   Length of Need: [x] 12 Months [] 99 Months [] Other:    Machine (SUNSHINE!): [] Respironics Dream Station   2   Auto [] ResMed AirSense     Auto [] Other:     []  CPAP () [] Bilevel ()   Mode: [] Auto [] Spontaneous    Mode: [] Auto [] Spontaneous            Comfort Settings:   - Ramp Pressure:  cmH2O                                        - Ramp time: 15 min                                     -  Flex/EPR - 3 full time                                    - For ResMed Bilevel (TiMax-4 sec   TiMin- 0.2 sec)     Humidifier: [x] Heated ()        [x] Water chamber replacement ()/ 1 per 6 months        Mask:   [x] Nasal () /1 per 3 months [] Full Face () /1 per 3 months   [x] Patient choice -Size and fit mask [] Patient Choice - Size and fit mask   [] Dispense:  [] Dispense:    [x] Headgear () / 1 per 3 months [] Headgear () / 1 per 3 months   [x] Replacement Nasal Cushion ()/2 per month [] Interface Replacement ()/1 per month   [] Replacement Nasal Pillows ()/2 per month         Tubing: [x] Heated ()/1 per 3 months    [] Standard ()/1 per 3 months [] Other:           Filters: [x] Non-disposable ()/1 per 6 months     [x] Ultra-Fine, Disposable ()/2 per month        Miscellaneous: [] Chin Strap ()/ 1 per 6 months [] O2 bleed-in:       LPM   [] Oximetry on CPAP/Bilevel []  Other:    [x] Modem: ()         Start Order Date: 21    MEDICAL JUSTIFICATION:  I, the undersigned, certify that the above prescribed supplies are medically necessary for this patients wellbeing. In my opinion, the supplies are both reasonable and necessary in reference to accepted standards of medicalpractice in treatment of this patients condition.     ZULY Jiménez - CNP      NPI: [id number]       Order Signed Date: 21    Electronically signed by Federico Baldwin APRN - CNP on 2021 at 10:44 AM    Daly Barrios  1960  [address] [phone] (home) [phone] (work)  [phone] (mobile)      Insurance Info (confirm with patient if correct):  Payor/Plan Subscr  Sex Relation Sub.  Ins. ID Effective Group Num

## 2021-07-07 NOTE — PROGRESS NOTES
by ZULY Cornelius CNP on 2021 at 10:46 AM    Brendno Mulligan  1960  [address] [phone] (home) [phone] (work)  [phone] (mobile)      Insurance Info (confirm with patient if correct):  Payor/Plan Subscr  Sex Relation Sub.  Ins. ID Effective Group Num

## 2021-07-07 NOTE — PROGRESS NOTES
Liana Magallon MD, FAASM, [facility]  Rosita Garcia, MSN, RN, CNP     [address] [facility]  [place] [address] [place] [address]  Dept: [phone]  Dept Fax: [phone]  Loc: [phone]    Subjective:     Patient ID: Irwin Velarde is a 56 y.o. male. Chief Complaint   Patient presents with    Sleep Apnea       HPI:      Sleep Medicine Video Visit    Pursuant to the emergency declaration under the [address] [place] [address], [facility] waiver authority and the [employer] Act this Telephone Visit was insisted, with patient's consent, to reduce the patient's risk of exposure to COVID-19 and provide continuity of care for an established patient. Services were provided through a synchronous discussion over a telephone and/or Video chat to substitute for in-person clinic visit, and coded as such. While patient is at home.     Machine Modem/Download Info:  Compliance (hours/night): 7.9 hrs/night  Download AHI (/hour): 5.2 /HR  Average CPAP Pressure : 9.8 cmH2O           APAP - Settings  Pressure Min: 7 cmH2O  Pressure Max: 20 cmH2O                 Comfort Settings  Humidity Level (0-8): 4  Flex/EPR (0-3): 3 PAP Mask  Mask Type: Nasal mask  Clinically Relevant Leak: No     Guffey - Total score: 5    Follow-up :     Last Visit : January 2021      Subjective Health Changes: none      Over Night Oximetry: [] Yes  [] No  [x] NA [] WNL   Using O2: [] Yes  [] No  [x] NA   Patient is compliant with the machine  [x] Yes  [] No [] Per patient   Feeling rested when using the machine   [x] Yes  [] No     Pressure is comfortable with inspiration and expiration  [x] Yes  [] No   ([x] NA   [] Feeling of suffocation  [] Feeling like not enough air    [] To much pressure)     Noticed changes in pressure  [x] NA  [] Yes    [] No     Mask is fitting well  [x] Yes  [] No   Noting Mask Air Leak  [] Yes  [x] No Having painful Aerophagia  [] Yes  [x] No   Nocturia   0  per night. Having  HA upon waking  [] Yes  [x] No   Dry mouth upon waking   Dry Nose  Dry Eyes  [x] Yes  [] No   Congestion upon waking   [x] Yes  [] No    Nose Bleeds  [] Yes  [x] No   Using Sleep Aides  [x] NA  [] OTC  [] Per our office   [] Per another provider   Understands how to change humidification and/or tubing temperature for comfort while at home  [x] Yes  [] No     Difficulties falling asleep  [] Yes  [x] No   Difficulties staying asleep  [] Yes  [x] No   Approximate time to bed  10-11pm   Approximate wake time  7-8am   Taking Naps  rare   If taking naps usual length  60 min    If taking naps using the machine [] NA  [] Yes    [x] No    [] With and With out    Drowsy when driving  [] Yes  [x] No     Does patient carry a DOT/CDL  [] Yes  [x] No     Does patient carry FAA/Pilots License   [] Yes  [x] No      Any concerns noted with the machine at this time  [] Yes  [x] No       Assessment/Plan:   1. NEELIMA (obstructive sleep apnea)  Assessment & Plan:  After downloading data and reviewing  Reviewed compliance download with pt. Supplies and parts as needed for his machine. These are medically necessary. Continue medications per his PCP and other physicians. Limit caffeine use after 3pm.    The chronic medical conditions listed are directly related to the primary diagnosis listed above. The management of the primary diagnosis affects the secondary diagnosis and vice versa    Patient is complaint encouraged to maintain compliance to aide on controlling other stated healthcare concerns. 2. RBBB (right bundle branch block)  Assessment & Plan:  Chronic- stable. After speaking with patient:    Agree with current plan, and would agree to continue this plan per prescribing and managing physician. 3. Benign essential HTN  Assessment & Plan:  Chronic- stable.       After speaking with patient:    Agree with current plan, and would agree to continue this plan per prescribing and managing physician. 4. Allergic rhinitis due to pollen, unspecified seasonality  Assessment & Plan:  Chronic- stable. After speaking with patient:    Agree with current plan, and would agree to continue this plan per prescribing and managing physician. - After pulling data and reviewing it   - Reviewed compliance download with patient    -Medically necessary supplies and parts as needed for his machine.   - Continue medications per his primary care provider and other physicians.   - Encouraged to limit caffeine use after 3pm.    - Educated not to drive when feeling sleepy   - Patient using [address] tagWALLET  (if you are dry turn it high)  Napping with the machine  Inspire  Office locations  Compliance  Follow up  After speaking to the patient, patient is currently stable. We will continue with the current machine settings  Recall Information and DME contact     The chronic medical conditions listed are directly related to the primary diagnosis listed above. The management of the primary diagnosis affects the secondary diagnosis and vice versa.     - Will follow up in off in 12 months    Electronically signed by  Rosita Garcia, MSN, RN, CNP on 7/7/2021 at 10:53 AM

## 2021-09-29 ENCOUNTER — TELEPHONE (OUTPATIENT)
Dept: PULMONOLOGY | Age: 61
End: 2021-09-29

## 2021-09-29 NOTE — PROGRESS NOTES
Diagnosis: [x] NEELIMA (G47.33) [] CSA (G47.31) [] Apnea (G47.30)   Length of Need: [x] 10 Months [] 99 Months [] Other:   Machine (SUNSHINE!): [] Respironics Dream Station   2   Auto [x] ResMed AirSense     Auto 11 [] Other:     [x]  CPAP () [] Bilevel ()   Mode: [x] Auto [] Spontaneous    Mode: [] Auto [] Spontaneous        APAP - Settings  Pressure Min: 7 cmH2O  Pressure Max: 20 cmH2O                Comfort Settings: Ramp Pressure: 4 cmH2O  Ramp time: 15 min  Flex/EPR - 3 full time                                  For ResMed Bileve (TiMax-4 sec   TiMin- 0.2 sec)     Humidifier: [] Heated ()        [x] Water chamber replacement ()/ 1 per 6 months        Mask:   [] Nasal () /1 per 3 months [] Full Face () /1 per 3 months   [] Patient choice -Size and fit mask [] Patient Choice - Size and fit mask   [] Dispense: [] Dispense:   [] Headgear () / 1 per 3 months [] Headgear () / 1 per 3 months   [] Replacement Nasal Cushion ()/2 per month [] Interface Replacement ()/1 per month   [] Replacement Nasal Pillows ()/2 per month         Tubing: [x] Heated ()/1 per 3 months    [] Standard ()/1 per 3 months [] Other:           Filters: [x] Non-disposable ()/1 per 6 months     [x] Ultra-Fine, Disposable ()/2 per month        Miscellaneous: [] Chin Strap ()/ 1 per 6 months [] O2 bleed-in:        LPM   [] Oxymetry on CPAP/Bilevel []  Other:         Start Order Date: 09/29/21    MEDICAL JUSTIFICATION:  I, the undersigned, certify that the above prescribed supplies are medically necessary for this patients wellbeing. In my opinion, the supplies are both reasonable and necessary in reference to accepted standards of medicalpractice in treatment of this patients condition.     Francisco Abbott CNP     NPI: [id number]      Order Signed Date: 09/29/21    Charo Doing  1960  [address]   [place] [facility] [phone] (home) [phone] (work)  [phone] (mobile)      Insurance Info (confirm with patient if correct):  Payor/Plan Subscr  Sex Relation Sub.  Ins. ID Effective Group Num

## 2021-09-29 NOTE — TELEPHONE ENCOUNTER
Patient called the office to say he sent his machine back to [facility] because it was under recall and his insurance said since they were still in the rental period, he could send it back and get one from another DME. He said he wanted to go through another \"supplier\" of ours. I explained to the patient that the DME is just like a pharmacy. He would need to contact his insurance to see who they are contracted with and then call us back with which DME company he would like us to send the order to. Explained to the patient that many suppliers do not have inventory to give machines to patients that are part of the recall. Also explained how [employer] does not support patients after they sell them the machines if something were to go wrong with the machine. Patient said he would call us back with DME of his choice.   [phone]

## 2021-11-10 ENCOUNTER — OFFICE VISIT (OUTPATIENT)
Dept: INTERNAL MEDICINE CLINIC | Age: 61
End: 2021-11-10
Payer: COMMERCIAL

## 2021-11-10 VITALS
HEIGHT: 73 IN | OXYGEN SATURATION: 98 % | BODY MASS INDEX: 25.84 KG/M2 | RESPIRATION RATE: 14 BRPM | HEART RATE: 65 BPM | SYSTOLIC BLOOD PRESSURE: 116 MMHG | WEIGHT: 195 LBS | DIASTOLIC BLOOD PRESSURE: 84 MMHG

## 2021-11-10 DIAGNOSIS — Z12.5 SCREENING FOR PROSTATE CANCER: ICD-10-CM

## 2021-11-10 DIAGNOSIS — G47.33 OSA (OBSTRUCTIVE SLEEP APNEA): ICD-10-CM

## 2021-11-10 DIAGNOSIS — D12.6 ADENOMATOUS POLYP OF COLON, UNSPECIFIED PART OF COLON: ICD-10-CM

## 2021-11-10 DIAGNOSIS — Z13.1 SCREENING FOR DIABETES MELLITUS: ICD-10-CM

## 2021-11-10 DIAGNOSIS — I10 BENIGN ESSENTIAL HTN: Primary | ICD-10-CM

## 2021-11-10 DIAGNOSIS — E88.81 METABOLIC SYNDROME: ICD-10-CM

## 2021-11-10 DIAGNOSIS — Z23 NEED FOR TDAP VACCINATION: ICD-10-CM

## 2021-11-10 DIAGNOSIS — R73.01 IMPAIRED FASTING GLUCOSE: ICD-10-CM

## 2021-11-10 DIAGNOSIS — R82.998 FOAMY URINE: ICD-10-CM

## 2021-11-10 DIAGNOSIS — E78.5 HYPERLIPIDEMIA, UNSPECIFIED HYPERLIPIDEMIA TYPE: ICD-10-CM

## 2021-11-10 LAB
BILIRUBIN URINE: NEGATIVE
BLOOD, URINE: NEGATIVE
CLARITY: CLEAR
COLOR: YELLOW
EPITHELIAL CELLS, UA: 0 /HPF (ref 0–5)
GLUCOSE URINE: NEGATIVE MG/DL
HYALINE CASTS: 0 /LPF (ref 0–8)
KETONES, URINE: NEGATIVE MG/DL
LEUKOCYTE ESTERASE, URINE: NEGATIVE
MICROSCOPIC EXAMINATION: NORMAL
NITRITE, URINE: NEGATIVE
PH UA: 6.5 (ref 5–8)
PROTEIN UA: NEGATIVE MG/DL
RBC UA: 1 /HPF (ref 0–4)
SPECIFIC GRAVITY UA: 1.02 (ref 1–1.03)
URINE TYPE: NORMAL
UROBILINOGEN, URINE: 0.2 E.U./DL
WBC UA: 0 /HPF (ref 0–5)

## 2021-11-10 PROCEDURE — 99214 OFFICE O/P EST MOD 30 MIN: CPT | Performed by: INTERNAL MEDICINE

## 2021-11-10 PROCEDURE — 90715 TDAP VACCINE 7 YRS/> IM: CPT | Performed by: INTERNAL MEDICINE

## 2021-11-10 PROCEDURE — 90471 IMMUNIZATION ADMIN: CPT | Performed by: INTERNAL MEDICINE

## 2021-11-10 NOTE — PATIENT INSTRUCTIONS
allergies. · Has had a coma, decreased level of consciousness, or prolonged seizures within 7 days after a previous dose of any pertussis vaccine (DTP, DTaP, or Tdap). · Has seizures or another nervous system problem. · Has ever had Guillain-Barré Syndrome (also called GBS). · Has had severe pain or swelling after a previous dose of any vaccine that protects against tetanus or diphtheria. In some cases, your health care provider may decide to postpone Tdap vaccination to a future visit. People with minor illnesses, such as a cold, may be vaccinated. People who are moderately or severely ill should usually wait until they recover before getting Tdap vaccine. Your health care provider can give you more information. Risks of a vaccine reaction  · Pain, redness, or swelling where the shot was given, mild fever, headache, feeling tired, and nausea, vomiting, diarrhea, or stomachache sometimes happen after Tdap vaccine. People sometimes faint after medical procedures, including vaccination. Tell your provider if you feel dizzy or have vision changes or ringing in the ears. As with any medicine, there is a very remote chance of a vaccine causing a severe allergic reaction, other serious injury, or death. What if there is a serious problem? An allergic reaction could occur after the vaccinated person leaves the clinic. If you see signs of a severe allergic reaction (hives, swelling of the face and throat, difficulty breathing, a fast heartbeat, dizziness, or weakness), call 9-1-1 and get the person to the nearest hospital.  For other signs that concern you, call your health care provider. Adverse reactions should be reported to the Vaccine Adverse Event Reporting System (VAERS). Your health care provider will usually file this report, or you can do it yourself. Visit the VAERS website at www.vaers. hhs.gov or call 6-713.325.7059.  VAERS is only for reporting reactions, and VAERS staff do not give medical advice. The National Vaccine Injury Compensation Program  The National Vaccine Injury Compensation Program (VICP) is a federal program that was created to compensate people who may have been injured by certain vaccines. Visit the VICP website at www.hrsa.gov/vaccinecompensation or call 5-395.800.4393 to learn about the program and about filing a claim. There is a time limit to file a claim for compensation. How can I learn more? · Ask your health care provider. · Call your local or state health department. · Contact the Centers for Disease Control and Prevention (CDC):  ? Call 5-406.789.7739 (1-800-CDC-INFO) or  ? Visit CDC's website at www.cdc.gov/vaccines  Vaccine Information Statement (Interim)  Tdap (Tetanus, Diphtheria, Pertussis) Vaccine  04/01/2020  [address] [id number]  Department of Health and Human Services  Centers for Disease Control and Prevention  Many Vaccine Information Statements are available in Arabic and other languages. See www.immunize.org/vis. Muchas hojas de información sobre vacunas están disponibles en español y en otros idiomas. Visite www.immunize.org/vis. Care instructions adapted under license by [facility] ([facility]). If you have questions about a medical condition or this instruction, always ask your healthcare professional. [address] any warranty or liability for your use of this information. Patient Education        Prostate Cancer Screening: Care Instructions  Overview     Prostate cancer is the abnormal growth of cells in the prostate. This is a small organ below the bladder that makes fluid for semen. Screening can help find prostate cancer early. When it's found and treated early, the cancer may be cured. But it's not always treated. That's because the treatments can cause serious side effects. In most cases, prostate cancer isn't life-threatening. This is especially true in someone who is older and when the cancer grows slowly.   Prostate cancer is a common type of cancer. Most cases occur after age 72. The disease runs in families. And it's more common in  Americans. Follow-up care is a key part of your treatment and safety. Be sure to make and go to all appointments, and call your doctor if you are having problems. It's also a good idea to know your test results and keep a list of the medicines you take. What is the screening test for prostate cancer? The main screening test for prostate cancer is the prostate-specific antigen (PSA) test. This is a blood test that measures how much PSA is in your blood. A high level may mean that you have an enlarged prostate, an infection, or cancer. Along with the PSA test, you may have a digital (finger) rectal exam. This exam checks for anything abnormal in your prostate. To do the exam, the doctor puts a lubricated, gloved finger into your rectum. If these tests suggest cancer, you may need a prostate biopsy. How is prostate cancer diagnosed? In a biopsy, the doctor takes small tissue samples from your prostate gland. Another doctor then looks at the tissue under a microscope to see if there are cancer cells, signs of infection, or other problems. The results help diagnose prostate cancer. What are the pros and cons of screening? Neither a PSA test nor a digital rectal exam can tell you for sure that you do or do not have cancer. But they can help you decide if you need more tests, such as a prostate biopsy. Screening tests may be useful because prostate cancer often doesn't cause symptoms. It can be hard to know if you have cancer until it's more advanced. And then it's harder to treat. But having a PSA test can also cause harm. The test may show high levels of PSA that aren't caused by cancer. So you could have a prostate biopsy you didn't need. Or the PSA test might be normal when there is cancer, so a cancer might not be found early.  The test can also find cancers that would never have caused a problem during your lifetime. So you might have treatment that wasn't needed. Prostate cancer usually develops late in life and grows slowly. In most cases, it doesn't shorten lives. Some experts advise screening only if you are at high risk. Talk with your doctor to see if screening is right for you. Where can you learn more? Go to https://chpepiceweb.c3 creations. org and sign in to your NuLabel account. Enter R550 in the FANCRU box to learn more about \"Prostate Cancer Screening: Care Instructions. \"     If you do not have an account, please click on the \"Sign Up Now\" link. Current as of: December 17, 2020               Content Version: 13.0  © 2006-2021 Healthwise, Incorporated. Care instructions adapted under license by [facility] ([facility]). If you have questions about a medical condition or this instruction, always ask your healthcare professional. [address] any warranty or liability for your use of this information.

## 2021-11-10 NOTE — PROGRESS NOTES
[facility]) Physicians  Internal Medicine  Patient Encounter  Sobia Negron D.O., [facility]        Chief Complaint   Patient presents with   Cristela Carlos    Medication Check       HPI: 59 y.o. male seen today requesting his routine checkup regarding the status of current chronic medical problems listed below along with a medication review and reconciliation. He states he retired 4/1/2021!! Candie Arana has multiple medical problems including but not limited to hypertension, hyperlipidemia, impaired fasting glucose, ruptured cerebral aneurysm, hypertensive emergency, venous insufficiency, NEELIMA, impaired fasting glucose, mild to moderate mitral regurgitation      Patient offers no new concerns. He states everything is been going well in his life. He retired in April. He did his colonoscopy 8/9/2021-- 5 year recall. He will go to [place] for the winter. Pt states his urine can be foamy. He states most days the urine can be foamy. Otherwise he has been doing well. He denies urinary frequency, urgency, nocturia, hematuria. He denies discolored urine. He denies swelling, CP, SOB, dizziness, lightheadedness, leg cramps. He denies palpitations. He is going to see Dr. Gwen Fajardo (ENT) to inquire about Inspire for his NEELIMA. His sleep study 11/11/2020-- AHI--45/hr. Severe NEELIMA. SaO2 dropped to 78%.     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 Foamy urine  Etiology is unclear  Rule out proteinuria  - URINALYSIS WITH MICROSCOPIC  - Comprehensive Metabolic Panel; Future    3. Impaired fasting glucose  Condition stability and control are uncertain. Due for lab  Continue lifestyle modification including a carb controlled diet and regular exercise  - Comprehensive Metabolic Panel; Future  - Hemoglobin A1C; Future    4. Hyperlipidemia, unspecified hyperlipidemia type  Condition is well controlled  Due for lab to ensure stability  Continue statin therapy as part of his overall cardiovascular disease risk reduction strategy. - Comprehensive Metabolic Panel; Future  - Lipid Panel; Future    5. NEELIMA (obstructive sleep apnea)  Condition is untreated currently  Patient is investigating Inspire    6. Need for Tdap vaccination  Tdap booster given today  - Tdap (age 6y and older) IM ([email or web address])    7. Metabolic syndrome  Condition stability and control are uncertain  Recheck lab  Continue efforts with Lifestyle modification including low calorie diet focusing on Low fat/low cholesterol and low carbohydrate intake, along with  increasing cardiovascular (aerobic) exercise. - Comprehensive Metabolic Panel; Future  - Lipid Panel; Future  - TSH without Reflex; Future  - Hemoglobin A1C; Future    8. Adenomatous polyp of colon, unspecified part of colon  Status post colonoscopy. 5-year recall (2026)    9. Screening for prostate cancer    - PSA screening; Future    10. Screening for diabetes mellitus    - Comprehensive Metabolic Panel; Future  - Hemoglobin A1C; Future            Discussed medications with patient who voiced understanding of their use, indication and potential side effects. Pt also understands the above recommendations. All questions answered.     This note was generated completely or in part utilizing Dragon dictation speech recognition software. Occasionally, words are mistranscribed and despite editing, the text may contain inaccuracies due to incorrect word recognition.   If further clarification is needed please contact the office at [phone]       Electronically signed    Ashwin Olson D.O.

## 2022-01-12 RX ORDER — ASPIRIN 81 MG/1
TABLET, COATED ORAL
Qty: 90 TABLET | Refills: 3 | Status: SHIPPED | OUTPATIENT
Start: 2022-01-12

## 2022-02-02 ENCOUNTER — TELEPHONE (OUTPATIENT)
Dept: INTERNAL MEDICINE CLINIC | Age: 62
End: 2022-02-02

## 2022-02-02 NOTE — TELEPHONE ENCOUNTER
Patient absolutely needs to be seen by an ophthalmologist.  Not an optometrist.  He could have problems with the vasculature in his eye or nerves in his eye. It is good to know that his blood pressure is under good control. He may need an MRI of his brain as well.

## 2022-02-02 NOTE — TELEPHONE ENCOUNTER
Patient is currently in [address] [address] [place] [address] and today he lost vision in his left eye for about 2-3 minutes-able to see light but nothing else. All good now. No eye pain or headache. His girlfriend is a nurse and took his BP and it was 120/66, pulse was 55 and oxygen was 98% (taken a few hours after episode). This is the second time this has happened in the last few months. Please call patient to discuss his concerns and best option to treat at number provided. Patient has had a stroke in the past (Dr. Damon Lambert is aware).

## 2022-02-15 RX ORDER — LISINOPRIL 10 MG/1
TABLET ORAL
Qty: 90 TABLET | Refills: 3 | Status: SHIPPED | OUTPATIENT
Start: 2022-02-15

## 2022-02-15 RX ORDER — ATORVASTATIN CALCIUM 20 MG/1
TABLET, FILM COATED ORAL
Qty: 90 TABLET | Refills: 3 | Status: SHIPPED | OUTPATIENT
Start: 2022-02-15

## 2022-02-15 NOTE — TELEPHONE ENCOUNTER
Last appointment: 11/10/2021  Next appointment: Visit date not found  Last refill: 3/15/2021  not due back until 5/10/2022

## 2022-02-16 NOTE — TELEPHONE ENCOUNTER
Patient saw Dr. Mina Andino in [address] [address] [place] [address] (Opthamologist) and they were supposed to fax over office notes. Did we receive? How does Dr. Trae Lee want to proceed? Dr. Mina Andino thought 3 possible causes but patient in too good of health to have 2 of the 3. Please call patient at [phone] to discuss.

## 2022-02-16 NOTE — TELEPHONE ENCOUNTER
Spoke wit patient advised him we have not seen any reports from Dr Nicole Khanna. I provided him with our fax number and he will call Dr Nicole Khanna to have those records faxed to us for Dr Velia Singh to review.

## 2022-04-19 ENCOUNTER — TELEPHONE (OUTPATIENT)
Dept: INTERNAL MEDICINE CLINIC | Age: 62
End: 2022-04-19

## 2022-04-19 NOTE — TELEPHONE ENCOUNTER
Patient states he has a pre-op appointment today at [facility]. [facility], [facility]. He states they will be doing an EKG, and his EKG's always indicate a problem. He is requesting to know the name of what causes this, so he can inform them at his appointment today at 1:15. Please contact patient @ phone # provided.

## 2022-07-25 ENCOUNTER — OFFICE VISIT (OUTPATIENT)
Dept: INTERNAL MEDICINE CLINIC | Age: 62
End: 2022-07-25
Payer: COMMERCIAL

## 2022-07-25 VITALS
BODY MASS INDEX: 25.92 KG/M2 | RESPIRATION RATE: 14 BRPM | SYSTOLIC BLOOD PRESSURE: 110 MMHG | HEIGHT: 73 IN | DIASTOLIC BLOOD PRESSURE: 66 MMHG | HEART RATE: 51 BPM | WEIGHT: 195.6 LBS | OXYGEN SATURATION: 99 %

## 2022-07-25 DIAGNOSIS — I45.10 RBBB: ICD-10-CM

## 2022-07-25 DIAGNOSIS — Z23 NEED FOR SHINGLES VACCINE: ICD-10-CM

## 2022-07-25 DIAGNOSIS — G47.33 OSA (OBSTRUCTIVE SLEEP APNEA): ICD-10-CM

## 2022-07-25 DIAGNOSIS — E78.5 HYPERLIPIDEMIA, UNSPECIFIED HYPERLIPIDEMIA TYPE: ICD-10-CM

## 2022-07-25 DIAGNOSIS — Z13.1 SCREENING FOR DIABETES MELLITUS: ICD-10-CM

## 2022-07-25 DIAGNOSIS — G45.9 TIA (TRANSIENT ISCHEMIC ATTACK): ICD-10-CM

## 2022-07-25 DIAGNOSIS — R73.01 IMPAIRED FASTING GLUCOSE: ICD-10-CM

## 2022-07-25 DIAGNOSIS — I34.0 MITRAL VALVE INSUFFICIENCY, UNSPECIFIED ETIOLOGY: ICD-10-CM

## 2022-07-25 DIAGNOSIS — Z00.00 ANNUAL PHYSICAL EXAM: Primary | ICD-10-CM

## 2022-07-25 DIAGNOSIS — R94.31 ABNORMAL EKG: ICD-10-CM

## 2022-07-25 DIAGNOSIS — H53.121 TRANSIENT VISUAL LOSS OF RIGHT EYE: ICD-10-CM

## 2022-07-25 DIAGNOSIS — I10 BENIGN ESSENTIAL HTN: ICD-10-CM

## 2022-07-25 DIAGNOSIS — Z12.5 SCREENING FOR PROSTATE CANCER: ICD-10-CM

## 2022-07-25 PROBLEM — G47.30 SEVERE SLEEP APNEA: Status: ACTIVE | Noted: 2020-11-11

## 2022-07-25 PROCEDURE — 93000 ELECTROCARDIOGRAM COMPLETE: CPT | Performed by: INTERNAL MEDICINE

## 2022-07-25 PROCEDURE — 90750 HZV VACC RECOMBINANT IM: CPT | Performed by: INTERNAL MEDICINE

## 2022-07-25 PROCEDURE — 99396 PREV VISIT EST AGE 40-64: CPT | Performed by: INTERNAL MEDICINE

## 2022-07-25 PROCEDURE — 90471 IMMUNIZATION ADMIN: CPT | Performed by: INTERNAL MEDICINE

## 2022-07-25 ASSESSMENT — PATIENT HEALTH QUESTIONNAIRE - PHQ9
2. FEELING DOWN, DEPRESSED OR HOPELESS: 0
SUM OF ALL RESPONSES TO PHQ QUESTIONS 1-9: 0
1. LITTLE INTEREST OR PLEASURE IN DOING THINGS: 0
SUM OF ALL RESPONSES TO PHQ9 QUESTIONS 1 & 2: 0
SUM OF ALL RESPONSES TO PHQ QUESTIONS 1-9: 0

## 2022-07-25 NOTE — PATIENT INSTRUCTIONS
Preventive plan of care for Gennaro Yoder        7/25/2022           Preventive Measures Status       Recommendations for screening   Prostate Cancer Screen  Lab Results   Component Value Date    PSA 2.61 11/10/2021     Recheck 1 year   Colon Cancer Screen  Last colonoscopy: 8/9/2021 Test is due every 5 years--Due 2026     Diabetes Screen  Glucose (mg/dL)   Date Value   04/19/2022 90    Test recommended and ordered   Cholesterol Screen  Lab Results   Component Value Date    CHOL 202 (H) 11/10/2021    TRIG 90 11/10/2021    HDL 59 11/10/2021    LDLCALC 125 (H) 11/10/2021    LDLDIRECT 94 02/06/2017    Test recommended and ordered   HIV screening recommended for those ages 12-76 NO MATTER THE RISK FOR HIV--8/8/2016 No need to repeat at this time   Hepatitis C screening recommended for patients between the ages of 18-79--8/8/2016 No need to repeat at this time   Aspirin for Cardiovascular Prevention   Yes Continue daily aspirin    Recommended Immunizations    Immunization History   Administered Date(s) Administered    COVID-19, MODERNA BLUE border, Primary or Immunocompromised, (age 12y+), IM, 100 mcg/0.5mL 02/24/2021, 03/24/2021, 11/05/2021    Influenza 11/29/2012    Influenza Virus Vaccine 11/01/2014, 11/11/2018, 11/02/2020    Influenza, Sloane Uribe, IM, PF (6 mo and older Fluzone, Flulaval, Fluarix, and 3 yrs and older Afluria) 02/06/2017, 09/13/2017    Tdap (Boostrix, Adacel) 09/07/2011, 11/10/2021    Zoster Recombinant (Shingrix) 06/08/2021, 07/25/2022    Influenza vaccine: recommended every fall    Pneumonia vaccine: Prevnar 21 due at age 72. Shingles vaccine: Shingrx #2 is due    Tetanus vaccine: tetanus and diptheria vaccine Td/Tdap,  recommended every 10 years- Td due 2031        Additional Recommendations   1. Use sunscreen daily to help reduce the risk of skin cancer  2. Continue a healthy lifestyle including a low-fat, portion controlled diet along with increasing cardiovascular exercise  3.  Always wear a seat belt while riding in a car  4. Obtain an eye examination every year to screen for cataracts, glaucoma, macular degeneration. 5.  Perform monthly testicular checks. Report any changes. Here are a few  Reliable websites with a variety of health and wellness information:   www.mylifecheck. heart. org     www.nutritionsource. org     www. americanheart. org     www. diabetes. org     www.[facility]     www.[facility] site)

## 2022-07-25 NOTE — PROGRESS NOTES
[facility]) Physicians  Internal Medicine  Patient Encounter  Ashwin Olson D.O., [facility] Preventative Physical    Chief Complaint   Patient presents with    Annual Exam       HPI-- 59 y.o. male presents today requesting a complete annual physical.            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 08/25/2020    Carotid atherosclerosis     Cerebral aneurysm rupture ([facility] 10/01/2017    Colon polyps     Dilated aortic root (HCC)     HTN (hypertension)     Hyperlipidemia     Impaired fasting glucose     Mitral regurgitation     Mitral valve insufficiency     Psoriasis     RBBB (right bundle branch block)     Severe sleep apnea 11/11/2020    Subarachnoid hematoma ([facility] 10/01/2017    Varicose veins     Venous insufficiency     Venous reflux     Saphenofemoral          Past Surgical History:   Procedure Laterality Date    COLONOSCOPY  01/01/2001    COLONOSCOPY  01/01/2005    COLONOSCOPY  03/01/2010    Dr. Kolb Ring Right 01/08/2013    Dr. Tonio Jones ARTHROSCOPY Left 03/19/2019    LEFT SHOULDER ARTHROSCOPE WITH DEBRIDEMENT, SUBACROMIAL DECOMPRESSION, DISTAL CLAVICLE EXCISION, CALCIFIC TENDONITIS DEBRIDEMENT WITH ROTATOR CUFF AUGMENTATION [facility]   04/26/2022    Inspire    VEIN SURGERY  06/22/2015    RF ablation SFJ, calf , Dr. Lisset Mccann      cyst           Medications/Allergies     Prior to Visit Medications    Medication Sig   atorvastatin (LIPITOR) 20 MG tablet TAKE 1 TABLET DAILY   lisinopril (PRINIVIL;ZESTRIL) 10 MG tablet TAKE 1 TABLET DAILY   ASPIRIN LOW DOSE 81 MG EC tablet TAKE 1 TABLET DAILY   clobetasol (TEMOVATE) 0.05 % cream Apply topically 2 times daily for 10 days as needed. Do not use more than 10 days.    Cetirizine HCl (ZYRTEC ALLERGY PO)   Take 10 mg by mouth daily          Allergies   Allergen Reactions    Amoxicillin Nausea Only    Codeine Nausea Only         Substance Use History     Social History     Tobacco Use    Smoking status: Never    Smokeless tobacco: Never   Vaping Use    Vaping Use: Never used   Substance Use Topics    Alcohol use: Yes     Comment: occ    Drug use: Yes     Types: Marijuana Valdene Foley)     Comment: weekly          Family History     Family History   Problem Relation Age of Onset    Stroke Mother         Cerebral Aneurysms X 3    Cancer Mother         Small Cell Lung    Stroke Father               REVIEW OF SYSTEMS:    CONSTITUTIONAL:  Neg   Recent weight changes, fatigue, fever, chills or night sweats, anorexia, Sleep difficulties  EYES: Neg  Blurry vision, double vision, tearing, itching, eye pain.  + Episode of vision loss out of the right eye. Symptoms were transient. He saw Ophthalmologist.  2/2/2022. He had a previous episode about 2-3 months prior. No headaches, fever, fatigue. Patient saw Dr. Catrachito Glover in [address] [place] [address] (Opthamologist) and they were supposed to fax over office notes. Did we receive? How does Dr. Ish Young want to proceed? Dr. Catrachito Glover thought 3 possible causes but patient in too good of health to have 2 of the 3. Please call patient at [phone] to discuss. Reviewed letter from Dr. Raghu Bond:  Neg Hearing loss, tinnitus, vertigo, discharge or otalgia. NOSE:  Neg  Rhinorrhea, sneezing, itching, allergy, epistaxis, or snoring  MOUTH/THROAT:  Neg Bleeding gums, hoarseness, sore throat, dysphagia, throat infections, or dentures  RESPIRATORY:  Neg SOB ,wheeze, cough, sputum, hemoptysis. No report of + TB test.  History of severe sleep apnea (AHI 45) back in 2020, status post Inspire procedure, Dr. Drew Vaughn at [facility]. He will have a follow up sleep study. Sleeps through the night. CARDIOVASCULAR:  Neg Chest pain, palpitations, heart murmur, dyspnea on exertion, orthopnea, paroxysmal nocturnal dyspnea or edema of extremities, or claudication.   Has H/O LOIS.  Patient has been evaluated by cardiology, Dr. Eb Newman. 's at home. GASTROINTESTINAL:  Neg   Nausea, vomiting, or diarrhea, constipation,  hematemesis, heart burn, dysphagia,change in bowel movements or stool caliber, hematochezia, melena, abdominal pain, or food intolerance. Colonoscopy: Yes, 5/8/2016, 5-year recall  GENITOURINARY:  Neg  Urinary frequency, hesitancy, urgency, polyuria, dysuria, hematuria, nocturia, incontinence, change in stream, genital pain or swelling, kidney stones, STD's. LOUISE: Yes, PSA: Yes  HEMATOLOGIC/LYMPHATIC:  Neg  Anemia, bleeding dyscrasias, easy bruising, blood clots (DVT/PE), transfusions, or enlarged lymph nodes. Has had venous surgery, Dr. Amor Kong. He denies swelling. He wears compression stockings. MUSCULOSKELETAL:  Neg  New myalgias, bone pain,  joint swelling, low back pain, neck pain, radicular pain, or fractures. Left shoulder surgery 3/19/2019 by Dr. Nae Ceja-- for rotator cuff tear. NEUROLOGICAL:  Neg  Loss of Consciousness, memeory loss or forgetfulness, confusion, difficulty concentrating, seizures, insomina, aphasia or dysarthria unilateral weakness or paresthesias, ataxia, headaches, syncope, tremor, or H/O head trauma. Patient saw Dr. Julia Holly 11/2020. Follow up MRA in 3-4 years. PSYCHIATRIC:  Neg  Depression, anxiety, personality changes, nervousness, drug or alcohol use/abuse, H/O psych counseling: No, Psychotropics: No.  History of temper problems. Patient declined medical therapy  SKIN :  Neg  Rash, nail changes, sun burns, tattoos, change in moles, or skin color changes. He has a derm appointment at [facility] in March. He will see Jr Rosas CNP. ENDOCRINE:  Neg  Polydipsia,polyuria,abnormal weight changes,heat /cold intolerance, Hair changes. No H/O Diabetes or Thyroid disease.          Preventive Care:    Health Maintenance   Topic Date Due    COVID-19 Vaccine (4 - Booster for Moderna series) 03/05/2022    Flu vaccine (1) nodes  CARDIAC:  S1S2 NL. Regular rhythm. No murmur/clicks/rubs. No ectopy. PMI is non-displaced. VASC:  Pedal pulses 2/4. Carotid upstrokes 2+. No bruits noted. Surgical scars noted from vein stripping. New varicose veins and dilated venules BL LE. PULM:  Lungs are CTA. Symmetric breath sounds noted. AP Diameter NL. ACW battery for [address]. GI:  Abdomen is soft and nontender. No distension. No organomegaly. No masses. No pulsatile masses. :   External genitalia NL. No testicular or scrotal masses. Penile shaft is NL. + Vasectomy nodules  BREAST:  No masses. EXT:  No Cyanosis or clubbing. No edema. SKIN: Warm and dry, normal turgor, no rash or lesions of concern. Hyperpigmentation changes involving both lower extremity consistent with venous stasis  NEURO:  Cranial nerves 2-12 are NL. Speech fluent and coherent. Strength is 5/5 in all muscle groups. No sensory deficits. No focal or lateralizing deficits. Reflexes 2/4 and symmetric. Gait is normal.  MS:  No C/T/L paraspinal tenderness. No scoliosis. No joint effusions. Full joint ROM. PSYCH:  Mood and affect NL. Judgement and insight NL.      ASSESSMENT/PLAN:    1. Annual physical exam  All care gaps identified and addressed  Shingrix #2 given today  Counseled patient on the importance of updating COVID-19 vaccine booster #2  - EKG 12 Lead  - Zoster, SHINGRIX, (18 yrs +), IM  - CBC with Auto Differential; Future  - Hemoglobin A1C; Future  - Comprehensive Metabolic Panel; Future  - Lipid Panel; Future  - TSH; Future  - PSA Screening; Future    2. Need for shingles vaccine    - Zoster, SHINGRIX, (18 yrs +), IM    3. Impaired fasting glucose    - Hemoglobin A1C; Future  - Comprehensive Metabolic Panel; Future    4. Transient visual loss of right eye  This happened on 2/2/2022 down in [facility] ophthalmology. Differential considerations would include TIA, temporal arteritis, or an ocular migraine.   Carotid between the ages of 18-79--8/8/2016 No need to repeat at this time   Aspirin for Cardiovascular Prevention   Yes Continue daily aspirin    Recommended Immunizations    Immunization History   Administered Date(s) Administered    COVID-19, MODERNA BLUE border, Primary or Immunocompromised, (age 12y+), IM, 100 mcg/0.5mL 02/24/2021, 03/24/2021, 11/05/2021    Influenza 11/29/2012    Influenza Virus Vaccine 11/01/2014, 11/11/2018, 11/02/2020    Influenza, Katrina Oseguera, IM, PF (6 mo and older Fluzone, Flulaval, Fluarix, and 3 yrs and older Afluria) 02/06/2017, 09/13/2017    Tdap (Boostrix, Adacel) 09/07/2011, 11/10/2021    Zoster Recombinant (Shingrix) 06/08/2021, 07/25/2022    Influenza vaccine: recommended every fall    Pneumonia vaccine: Prevnar 20 due at age 72. Shingles vaccine: Shingrx #2 is due    Tetanus vaccine: tetanus and diptheria vaccine Td/Tdap,  recommended every 10 years- Td due 2031        Additional Recommendations   1. Use sunscreen daily to help reduce the risk of skin cancer  2. Continue a healthy lifestyle including a low-fat, portion controlled diet along with increasing cardiovascular exercise  3. Always wear a seat belt while riding in a car  4. Obtain an eye examination every year to screen for cataracts, glaucoma, macular degeneration. 5.  Perform monthly testicular checks. Report any changes. Here are a few  Reliable websites with a variety of health and wellness information:   www.mylifecheck. heart. org     www.nutritionsource. org     www. americanheart. org     www. diabetes. org     www.[facility]     www.[email or web address])

## 2022-07-29 ENCOUNTER — TELEPHONE (OUTPATIENT)
Dept: INTERNAL MEDICINE CLINIC | Age: 62
End: 2022-07-29

## 2022-07-29 DIAGNOSIS — G45.9 TIA (TRANSIENT ISCHEMIC ATTACK): ICD-10-CM

## 2022-07-29 DIAGNOSIS — I34.0 MITRAL VALVE INSUFFICIENCY, UNSPECIFIED ETIOLOGY: ICD-10-CM

## 2022-07-29 DIAGNOSIS — G47.33 OSA (OBSTRUCTIVE SLEEP APNEA): ICD-10-CM

## 2022-07-29 DIAGNOSIS — I45.10 RBBB: ICD-10-CM

## 2022-07-29 DIAGNOSIS — H53.121 TRANSIENT VISUAL LOSS OF RIGHT EYE: Primary | ICD-10-CM

## 2022-07-29 NOTE — TELEPHONE ENCOUNTER
Central scheduling requests new order for ECHO. Current order reads ECHO Complete With Bubble Study - Clinic Performed [ECH09]. It reads ancillary in the order but Clinic collect in the \"title\" of the order. Cannot say clinic collect anywhere on the order.  Please place new order

## 2022-08-03 ENCOUNTER — PATIENT MESSAGE (OUTPATIENT)
Dept: INTERNAL MEDICINE CLINIC | Age: 62
End: 2022-08-03

## 2022-08-03 RX ORDER — ATORVASTATIN CALCIUM 40 MG/1
TABLET, FILM COATED ORAL
Qty: 90 TABLET | Refills: 3 | Status: SHIPPED | OUTPATIENT
Start: 2022-08-03

## 2022-08-03 NOTE — TELEPHONE ENCOUNTER
From: Apollo Miles  To: Dr. Sosa Fan: 8/3/2022 7:19 AM EDT  Subject: Lipitor    You can call this prescription increase [facility].  Thanks

## 2022-08-10 ENCOUNTER — HOSPITAL ENCOUNTER (OUTPATIENT)
Dept: NON INVASIVE DIAGNOSTICS | Age: 62
Discharge: HOME | End: 2022-08-10
Payer: COMMERCIAL

## 2022-08-10 ENCOUNTER — HOSPITAL ENCOUNTER (OUTPATIENT)
Dept: VASCULAR LAB | Age: 62
Discharge: HOME | End: 2022-08-10
Payer: COMMERCIAL

## 2022-08-10 DIAGNOSIS — H53.121 TRANSIENT VISUAL LOSS OF RIGHT EYE: ICD-10-CM

## 2022-08-10 DIAGNOSIS — G45.9 TIA (TRANSIENT ISCHEMIC ATTACK): ICD-10-CM

## 2022-08-10 LAB
LV EF: 58 %
LVEF MODALITY: NORMAL

## 2022-08-10 PROCEDURE — 93306 TTE W/DOPPLER COMPLETE: CPT

## 2022-08-10 PROCEDURE — 93880 EXTRACRANIAL BILAT STUDY: CPT

## 2022-09-07 ENCOUNTER — TELEPHONE (OUTPATIENT)
Dept: INTERNAL MEDICINE CLINIC | Age: 62
End: 2022-09-07

## 2022-09-07 NOTE — TELEPHONE ENCOUNTER
Patient needs to get to the ophthalmologist [facility]SA. He can go to the [facility] urgent care. If symptoms are resolved completely, he can just make an appointment with ophthalmology.

## 2022-09-07 NOTE — TELEPHONE ENCOUNTER
Patient is calling to update Dr. George Silva and receive some advice on what he should do next. He states yesterday his vision became very blurred after bending over with his head down for about 2-5 minutes in his left eye. He says he did not lose complete vision but it was very blurry in left eye. Patient is concerned because he has experienced this in the past, about a year ago. Please contact patient to further advise on what to do next.

## 2022-12-05 ENCOUNTER — TELEPHONE (OUTPATIENT)
Dept: INTERNAL MEDICINE CLINIC | Age: 62
End: 2022-12-05

## 2022-12-05 DIAGNOSIS — M79.10 MYALGIA: Primary | ICD-10-CM

## 2022-12-05 NOTE — TELEPHONE ENCOUNTER
He can certainly stop the medication and see what happens. I would like him to have a blood test to check his muscle enzyme.

## 2022-12-05 NOTE — TELEPHONE ENCOUNTER
Patient believes he is having a reaction to the Lipitor. Patient states he is experiencing soreness in his legs and groin area that has been going on for the past month. Patient states it is the worst in the morning and the soreness is causing him to [facility] like he is 100. \" Patient also mentioned having some difficulty swallowing and neck stiffness. He said his sxs are similar to myopathy. Patient is going out of town Thursday and is wondering what he should do before then. Please advise.

## 2022-12-06 DIAGNOSIS — M79.10 MYALGIA: ICD-10-CM

## 2022-12-07 LAB — TOTAL CK: 89 U/L (ref 39–308)

## 2022-12-13 NOTE — TELEPHONE ENCOUNTER
I think for now he should remain off the medication. We will need to readdress his cholesterol. I want him to be 100% symptom-free before we explore that.

## 2022-12-13 NOTE — TELEPHONE ENCOUNTER
Patient is returning call and states he is getting better. He is not 100%  better but sxs not as severe. He confirmed he has been holding the medication for a week and has no difficulty swallowing anymore. The stiffness in his neck has gone down and he still has some pain and soreness around his hip joints but not nearly as bad as before. Patient would like to know if Dr. Randa Mae wants to begin him on a different medication. Please advise.        If necessary, please call any prescriptions into:  [facility]   [address] [facility] [address] [place] [address]  [phone]

## 2023-01-24 RX ORDER — LISINOPRIL 10 MG/1
TABLET ORAL
Qty: 90 TABLET | Refills: 0 | Status: SHIPPED | OUTPATIENT
Start: 2023-01-24

## 2023-01-24 NOTE — TELEPHONE ENCOUNTER
Patient is requesting a refill on the following medication:  lisinopril (PRINIVIL;ZESTRIL) 10 MG tablet  Last appointment: 7/25/2022  Next appointment: Visit date not found- not follow ups note at last appt- when is pt due? Last refill: 02/15/2022        Patient would like a 90 day supply called in. Please send to patients's new pharmacy:  [facility] ([address] [facility], [address] [place] [address] [phone] [facility] [phone]    Please contact patient with any questions.

## 2023-01-24 NOTE — TELEPHONE ENCOUNTER
Medication pended. Please advise when patient is to follow up, as no return date in last office note.  Patient was here for a physical on 7/25/2022

## 2023-02-22 ENCOUNTER — TELEPHONE (OUTPATIENT)
Dept: INTERNAL MEDICINE CLINIC | Age: 63
End: 2023-02-22

## 2023-02-22 NOTE — TELEPHONE ENCOUNTER
----- Message from Madyson Malave sent at 2/22/2023  9:29 AM EST -----  Subject: Message to Provider    QUESTIONS  Information for Provider? Pt has torn labrum left hip along with trigger   finger issues he would like to discuss getting referral to an ortho   specialist.   ---------------------------------------------------------------------------  --------------  Jocelyn Del Rosario [facility]  [phone]; OK to leave message on voicemail  ---------------------------------------------------------------------------  --------------  SCRIPT ANSWERS  Relationship to Patient? Self  Did you have an injury or trauma within the past 3 days? No  Is your joint red or swollen? No  Are you having new onset numbness, tingling, or weakness with the pain? No  Did you have an injury or trauma within the past 14 days? No  Did your pain begin within the past week? No  Are you having fevers (100.4), chills, or sweats? No  (Is the patient requesting to be seen urgently for their symptoms?)? No  Have you recently (14 days) seen a provider for this issue?  Yes

## 2023-03-27 RX ORDER — LISINOPRIL 10 MG/1
TABLET ORAL
Qty: 90 TABLET | Refills: 3 | Status: SHIPPED | OUTPATIENT
Start: 2023-03-27

## 2023-04-04 SDOH — ECONOMIC STABILITY: TRANSPORTATION INSECURITY
IN THE PAST 12 MONTHS, HAS LACK OF TRANSPORTATION KEPT YOU FROM MEETINGS, WORK, OR FROM GETTING THINGS NEEDED FOR DAILY LIVING?: NO

## 2023-04-04 SDOH — ECONOMIC STABILITY: FOOD INSECURITY: WITHIN THE PAST 12 MONTHS, YOU WORRIED THAT YOUR FOOD WOULD RUN OUT BEFORE YOU GOT MONEY TO BUY MORE.: NEVER TRUE

## 2023-04-04 SDOH — ECONOMIC STABILITY: HOUSING INSECURITY
IN THE LAST 12 MONTHS, WAS THERE A TIME WHEN YOU DID NOT HAVE A STEADY PLACE TO SLEEP OR SLEPT IN A SHELTER (INCLUDING NOW)?: NO

## 2023-04-04 SDOH — ECONOMIC STABILITY: INCOME INSECURITY: HOW HARD IS IT FOR YOU TO PAY FOR THE VERY BASICS LIKE FOOD, HOUSING, MEDICAL CARE, AND HEATING?: NOT HARD AT ALL

## 2023-04-04 SDOH — ECONOMIC STABILITY: FOOD INSECURITY: WITHIN THE PAST 12 MONTHS, THE FOOD YOU BOUGHT JUST DIDN'T LAST AND YOU DIDN'T HAVE MONEY TO GET MORE.: NEVER TRUE

## 2023-04-05 ENCOUNTER — OFFICE VISIT (OUTPATIENT)
Dept: INTERNAL MEDICINE CLINIC | Age: 63
End: 2023-04-05
Payer: COMMERCIAL

## 2023-04-05 VITALS
OXYGEN SATURATION: 98 % | SYSTOLIC BLOOD PRESSURE: 128 MMHG | BODY MASS INDEX: 26.74 KG/M2 | RESPIRATION RATE: 14 BRPM | WEIGHT: 191 LBS | HEIGHT: 71 IN | DIASTOLIC BLOOD PRESSURE: 72 MMHG | HEART RATE: 58 BPM

## 2023-04-05 DIAGNOSIS — R10.31 BILATERAL GROIN PAIN: ICD-10-CM

## 2023-04-05 DIAGNOSIS — M25.562 CHRONIC PAIN OF BOTH KNEES: Primary | ICD-10-CM

## 2023-04-05 DIAGNOSIS — R73.01 IMPAIRED FASTING GLUCOSE: ICD-10-CM

## 2023-04-05 DIAGNOSIS — M25.561 CHRONIC PAIN OF BOTH KNEES: Primary | ICD-10-CM

## 2023-04-05 DIAGNOSIS — R10.32 BILATERAL GROIN PAIN: ICD-10-CM

## 2023-04-05 DIAGNOSIS — I10 BENIGN ESSENTIAL HTN: ICD-10-CM

## 2023-04-05 DIAGNOSIS — G89.29 CHRONIC PAIN OF BOTH KNEES: Primary | ICD-10-CM

## 2023-04-05 DIAGNOSIS — M47.818 SI JOINT ARTHRITIS: ICD-10-CM

## 2023-04-05 DIAGNOSIS — M25.60 MORNING JOINT STIFFNESS: ICD-10-CM

## 2023-04-05 DIAGNOSIS — E78.5 HYPERLIPIDEMIA, UNSPECIFIED HYPERLIPIDEMIA TYPE: ICD-10-CM

## 2023-04-05 DIAGNOSIS — S73.191A TEAR OF RIGHT ACETABULAR LABRUM, INITIAL ENCOUNTER: ICD-10-CM

## 2023-04-05 DIAGNOSIS — M65.339 TRIGGER MIDDLE FINGER, UNSPECIFIED LATERALITY: ICD-10-CM

## 2023-04-05 PROCEDURE — 3078F DIAST BP <80 MM HG: CPT | Performed by: INTERNAL MEDICINE

## 2023-04-05 PROCEDURE — 99215 OFFICE O/P EST HI 40 MIN: CPT | Performed by: INTERNAL MEDICINE

## 2023-04-05 PROCEDURE — 3074F SYST BP LT 130 MM HG: CPT | Performed by: INTERNAL MEDICINE

## 2023-04-05 RX ORDER — PREDNISONE 10 MG/1
TABLET ORAL
Qty: 10 TABLET | Refills: 0 | Status: SHIPPED | OUTPATIENT
Start: 2023-04-05

## 2023-04-05 ASSESSMENT — PATIENT HEALTH QUESTIONNAIRE - PHQ9
SUM OF ALL RESPONSES TO PHQ QUESTIONS 1-9: 0
1. LITTLE INTEREST OR PLEASURE IN DOING THINGS: 0
SUM OF ALL RESPONSES TO PHQ9 QUESTIONS 1 & 2: 0
2. FEELING DOWN, DEPRESSED OR HOPELESS: 0

## 2023-04-05 NOTE — PROGRESS NOTES
aneurysm rupture ([facility] 10/01/2017    Colon polyps     Dilated aortic root (HCC)     HTN (hypertension)     Hyperlipidemia     Impaired fasting glucose     Mitral regurgitation     Mitral valve insufficiency     Psoriasis     RBBB (right bundle branch block)     Severe sleep apnea 11/11/2020    Subarachnoid hematoma ([facility] 10/01/2017    Varicose veins     Venous insufficiency     Venous reflux     Saphenofemoral         MEDICATIONS:  Prior to Visit Medications    Medication Sig   lisinopril (PRINIVIL;ZESTRIL) 10 MG tablet TAKE 1 TABLET BY MOUTH DAILY   atorvastatin (LIPITOR) 40 MG tablet Take one tablet by mouth nightly   ASPIRIN LOW DOSE 81 MG EC tablet TAKE 1 TABLET DAILY   clobetasol (TEMOVATE) 0.05 % cream Apply topically 2 times daily for 10 days as needed. Do not use more than 10 days. Cetirizine HCl (ZYRTEC ALLERGY PO)   Take 10 mg by mouth daily            Review of Systems - As per HPI  GEN: Pt denies fever, chills or night sweats. Denies weight changes. Appetite good  HEENT: Pt denies visual and auditory changes, epistaxis, upper respiratory symptoms and dysphagia  CV: Pt denies CP, SOB, HUERTAS, orthopnea palpitations, LE swelling, and claudication. PULM: Pt denies cough, wheezing or sputum production  GI: Pt denies N/V/D, heart burn, rectal bleeding, or melena. NEURO: Pt denies unilateral weakness, paresthesia and dizziness. OBJECTIVE:  Vitals:    04/05/23 0938   BP: 128/72   Pulse: 58   Resp: 14   SpO2: 98%   Weight: 191 lb (86.6 kg)   Height: 5' 11\" (1.803 m)     Body mass index is 26.64 kg/m². Wt Readings from Last 3 Encounters:   04/05/23 191 lb (86.6 kg)   07/25/22 195 lb 9.6 oz (88.7 kg)   11/10/21 195 lb (88.5 kg)     BP Readings from Last 3 Encounters:   04/05/23 128/72   07/25/22 110/66   11/10/21 116/84        GEN: NAD, A&O, Non-toxic  HEENT: NC/AT, KRIS, EOMI, Oral cavity Clear,  TM's NL, Nasal cavity clear. NECK: Supple. No thyromegaly. No JVD  LYMPH: No C/SC nodes.   CV: S1 S2

## 2023-04-06 ENCOUNTER — TELEPHONE (OUTPATIENT)
Dept: INTERNAL MEDICINE CLINIC | Age: 63
End: 2023-04-06

## 2023-04-06 ENCOUNTER — HOSPITAL ENCOUNTER (OUTPATIENT)
Age: 63
Discharge: HOME | End: 2023-04-06
Payer: COMMERCIAL

## 2023-04-06 ENCOUNTER — HOSPITAL ENCOUNTER (OUTPATIENT)
Dept: GENERAL RADIOLOGY | Age: 63
Discharge: HOME | End: 2023-04-06
Payer: COMMERCIAL

## 2023-04-06 DIAGNOSIS — M47.818 SI JOINT ARTHRITIS: ICD-10-CM

## 2023-04-06 DIAGNOSIS — R10.31 BILATERAL GROIN PAIN: ICD-10-CM

## 2023-04-06 DIAGNOSIS — M25.562 CHRONIC PAIN OF BOTH KNEES: ICD-10-CM

## 2023-04-06 DIAGNOSIS — R10.32 BILATERAL GROIN PAIN: ICD-10-CM

## 2023-04-06 DIAGNOSIS — G89.29 CHRONIC PAIN OF BOTH KNEES: ICD-10-CM

## 2023-04-06 DIAGNOSIS — S73.191A TEAR OF RIGHT ACETABULAR LABRUM, INITIAL ENCOUNTER: ICD-10-CM

## 2023-04-06 DIAGNOSIS — M25.561 CHRONIC PAIN OF BOTH KNEES: ICD-10-CM

## 2023-04-06 PROCEDURE — 73562 X-RAY EXAM OF KNEE 3: CPT

## 2023-04-06 PROCEDURE — 73521 X-RAY EXAM HIPS BI 2 VIEWS: CPT

## 2023-04-06 NOTE — TELEPHONE ENCOUNTER
Patient is calling stated that Dr. Rebeca Conway sent a referral to Dr. Dawna Somers and he called today schedule an appointment and they stated that they had not received the referral.   Can this be faxed again   F# [phone]

## 2023-04-07 ENCOUNTER — TELEPHONE (OUTPATIENT)
Dept: INTERNAL MEDICINE CLINIC | Age: 63
End: 2023-04-07

## 2023-04-07 NOTE — TELEPHONE ENCOUNTER
----- Message from Brownfaviola Lowery sent at 4/7/2023  8:33 AM EDT -----  Subject: Message to Provider    QUESTIONS  Information for Provider? Patient called to make appt with Rheumatology   Rolando Mercedes MD. They need the office notes from his last visit. Then they   will schedule an appt.   ---------------------------------------------------------------------------  --------------  Nataly CM  [phone]; OK to leave message on voicemail  ---------------------------------------------------------------------------  --------------  SCRIPT ANSWERS  Relationship to Patient?  Self

## 2023-04-17 ENCOUNTER — OFFICE VISIT (OUTPATIENT)
Dept: ORTHOPEDIC SURGERY | Age: 63
End: 2023-04-17

## 2023-04-17 VITALS — BODY MASS INDEX: 26.74 KG/M2 | WEIGHT: 191 LBS | HEIGHT: 71 IN

## 2023-04-17 DIAGNOSIS — S73.191D TEAR OF RIGHT ACETABULAR LABRUM, SUBSEQUENT ENCOUNTER: Primary | ICD-10-CM

## 2023-04-17 RX ORDER — BUPIVACAINE HYDROCHLORIDE 2.5 MG/ML
20 INJECTION, SOLUTION INFILTRATION; PERINEURAL ONCE
Status: COMPLETED | OUTPATIENT
Start: 2023-04-17 | End: 2023-04-17

## 2023-04-17 RX ADMIN — BUPIVACAINE HYDROCHLORIDE 50 MG: 2.5 INJECTION, SOLUTION INFILTRATION; PERINEURAL at 16:25

## 2023-04-18 NOTE — PROGRESS NOTES
4/17/23  4:09 PM        NDC: 94488-565-40   -   Marcaine 0.25 %    LOT: [id number]    COMMENT: RIGHT HIP INTRA-ARTICULAR MARCAINE INJECTION
[id number]  Email: [email or web address]  Office: [phone]    04/18/23  12:07 PM        The encounter with Shine Child was carried out by myself, Dr Karina Nguyen, who personally examined the patient and reviewed the plan. This dictation was performed with a verbal recognition program (DRAGON) and it was checked for errors. It is possible that there are still dictated errors within this office note. If so, please bring any errors to my attention for an addendum. All efforts were made to ensure that this office note is accurate.

## 2023-04-19 ENCOUNTER — OFFICE VISIT (OUTPATIENT)
Dept: ORTHOPEDIC SURGERY | Age: 63
End: 2023-04-19

## 2023-04-19 VITALS — HEIGHT: 71 IN | BODY MASS INDEX: 26.74 KG/M2 | WEIGHT: 191 LBS

## 2023-04-19 DIAGNOSIS — M25.851 FEMOROACETABULAR IMPINGEMENT OF RIGHT HIP: ICD-10-CM

## 2023-04-19 DIAGNOSIS — S73.191D TEAR OF RIGHT ACETABULAR LABRUM, SUBSEQUENT ENCOUNTER: Primary | ICD-10-CM

## 2023-04-19 NOTE — PROGRESS NOTES
Chief Complaint  Hip Pain (Right hip )      History of Present Illness:  Salina Joiner is a pleasant 57 y.o. male who is here for 48 hr post hip marcaine injection re-assessment. It completely took away his pain in the right hip during squatting/exercising and reaching down to his foot. Since then it has worn off. He is retired. Lives with a GF who is a nurse,  and his daughter lives in town and is a radiology tech. Medical History:  Patient's medications, allergies, past medical, surgical, social and family histories were reviewed and updated as appropriate. ROS: Review of systems reviewed from Patient History Form completed today and available in the patient's chart under the Media tab. Pertinent items are noted in HPI  Review of systems reviewed from Patient History Form completed today and available in the patient's chart under the Media tab. Vital Signs:  Ht 5' 11\" (1.803 m)   Wt 191 lb (86.6 kg)   BMI 26.64 kg/m²         Neuro: Alert & oriented x 3,  normal,  no focal deficits noted. Normal affect. Eyes: sclera clear  Ears: Normal external ear  Mouth:  No perioral lesions  Pulm: Respirations unlabored and regular  Pulse: Extremities well perfused. 2+ peripheral pulses. Skin: Warm. No ulcerations. Constitutional: The physical examination finds the patient to be well-developed and well-nourished. The patient is alert and oriented x3 and was cooperative throughout the visit. Hip Examination: right    Skin/Inspection: No skin lesions, cellulitis, or extreme edema in the lower extremities. Standing/Walking: normal gait, negative Trendelenburg sign.       Supine/Side Lying Exam: Non tender around the major bony prominences  full range with pain  FADIR Positive  SARAH Negative  Resisted Abduction  5/5   Resisted Adduction  5/5   Resisted  Flexion  5/5    not tender at greater trochanter    Distal Neurovascular exam is intact (foot sensation, pulses, and motor

## 2023-04-21 ENCOUNTER — TELEPHONE (OUTPATIENT)
Dept: ORTHOPEDIC SURGERY | Age: 63
End: 2023-04-21

## 2023-04-21 DIAGNOSIS — S73.191D TEAR OF RIGHT ACETABULAR LABRUM, SUBSEQUENT ENCOUNTER: Primary | ICD-10-CM

## 2023-04-21 DIAGNOSIS — M25.851 FEMOROACETABULAR IMPINGEMENT OF RIGHT HIP: ICD-10-CM

## 2023-04-21 DIAGNOSIS — Z01.818 PRE-OPERATIVE CLEARANCE: ICD-10-CM

## 2023-04-26 ENCOUNTER — OFFICE VISIT (OUTPATIENT)
Dept: INTERNAL MEDICINE CLINIC | Age: 63
End: 2023-04-26

## 2023-04-26 VITALS
HEIGHT: 71 IN | DIASTOLIC BLOOD PRESSURE: 76 MMHG | OXYGEN SATURATION: 96 % | SYSTOLIC BLOOD PRESSURE: 132 MMHG | BODY MASS INDEX: 27.02 KG/M2 | HEART RATE: 60 BPM | WEIGHT: 193 LBS

## 2023-04-26 DIAGNOSIS — I45.10 RBBB: ICD-10-CM

## 2023-04-26 DIAGNOSIS — I10 BENIGN ESSENTIAL HTN: ICD-10-CM

## 2023-04-26 DIAGNOSIS — Z86.73 HISTORY OF TIA (TRANSIENT ISCHEMIC ATTACK): ICD-10-CM

## 2023-04-26 DIAGNOSIS — S73.191A TEAR OF RIGHT ACETABULAR LABRUM, INITIAL ENCOUNTER: ICD-10-CM

## 2023-04-26 DIAGNOSIS — M47.818 SI JOINT ARTHRITIS: ICD-10-CM

## 2023-04-26 DIAGNOSIS — I34.0 MITRAL VALVE INSUFFICIENCY, UNSPECIFIED ETIOLOGY: ICD-10-CM

## 2023-04-26 DIAGNOSIS — E78.5 HYPERLIPIDEMIA, UNSPECIFIED HYPERLIPIDEMIA TYPE: ICD-10-CM

## 2023-04-26 DIAGNOSIS — Z01.818 PRE-OPERATIVE CLEARANCE: ICD-10-CM

## 2023-04-26 DIAGNOSIS — G47.33 OSA (OBSTRUCTIVE SLEEP APNEA): ICD-10-CM

## 2023-04-26 DIAGNOSIS — Z01.818 PRE-OP EXAM: Primary | ICD-10-CM

## 2023-04-26 LAB
BASOPHILS # BLD: 0.1 K/UL (ref 0–0.2)
BASOPHILS NFR BLD: 1.1 %
BILIRUB UR QL STRIP.AUTO: NEGATIVE
CLARITY UR: CLEAR
COLOR UR: YELLOW
DEPRECATED RDW RBC AUTO: 13.6 % (ref 12.4–15.4)
EOSINOPHIL # BLD: 0.5 K/UL (ref 0–0.6)
EOSINOPHIL NFR BLD: 7.1 %
GLUCOSE UR STRIP.AUTO-MCNC: NEGATIVE MG/DL
HCT VFR BLD AUTO: 45.1 % (ref 40.5–52.5)
HGB BLD-MCNC: 14.6 G/DL (ref 13.5–17.5)
HGB UR QL STRIP.AUTO: NEGATIVE
INR PPP: 0.94 (ref 0.84–1.16)
KETONES UR STRIP.AUTO-MCNC: NEGATIVE MG/DL
LEUKOCYTE ESTERASE UR QL STRIP.AUTO: NEGATIVE
LYMPHOCYTES # BLD: 1.8 K/UL (ref 1–5.1)
LYMPHOCYTES NFR BLD: 26.5 %
MCH RBC QN AUTO: 31.1 PG (ref 26–34)
MCHC RBC AUTO-ENTMCNC: 32.4 G/DL (ref 31–36)
MCV RBC AUTO: 95.8 FL (ref 80–100)
MONOCYTES # BLD: 0.6 K/UL (ref 0–1.3)
MONOCYTES NFR BLD: 8.4 %
NEUTROPHILS # BLD: 3.9 K/UL (ref 1.7–7.7)
NEUTROPHILS NFR BLD: 56.9 %
NITRITE UR QL STRIP.AUTO: NEGATIVE
PH UR STRIP.AUTO: 5.5 [PH] (ref 5–8)
PLATELET # BLD AUTO: 266 K/UL (ref 135–450)
PMV BLD AUTO: 7.5 FL (ref 5–10.5)
PROT UR STRIP.AUTO-MCNC: NEGATIVE MG/DL
PROTHROMBIN TIME: 12.6 SEC (ref 11.5–14.8)
RBC # BLD AUTO: 4.71 M/UL (ref 4.2–5.9)
SP GR UR STRIP.AUTO: 1.02 (ref 1–1.03)
UA DIPSTICK W REFLEX MICRO PNL UR: NORMAL
URN SPEC COLLECT METH UR: NORMAL
UROBILINOGEN UR STRIP-ACNC: 0.2 E.U./DL
WBC # BLD AUTO: 6.8 K/UL (ref 4–11)

## 2023-04-26 NOTE — PROGRESS NOTES
normal.  NOSE:  Nasal cavity is clear. No mucosal congestion or discharge. Sinuses are nontender. MOUTH & THROAT:  Oral cavity is clear without mucosal lesions. Tongue is midline. Dentition is in good repair. No pharyngeal erythema or exudate. NECK:  Supple. Full ROM. Trachea is midline. No increased JVD. No thyromegaly or nodules. No masses  LYMPH: No C/SC/A/F nodes  CARDIAC:  S1S2 NL. Regular rhythm. No murmur/clicks/rubs. No ectopy. PMI is non-displaced. VASC:  Pedal pulses 2/4. Carotid upstrokes 2+. No bruits noted. PULM:  Lungs are CTA. Symmetric breath sounds noted. AP Diameter NL. GI:  Abdomen is soft and nontender. No distension. No organomegaly. No masses. No pulsatile masses. EXT:  No Cyanosis or clubbing. No edema. SKIN: Warm and dry, normal turgor, no rash or lesions of concern. NEURO:  Cranial nerves 2-12 are NL. Speech fluent and coherent. Strength is 5/5 in all muscle groups. No sensory deficits. No focal or lateralizing deficits. Reflexes 2/4 and symmetric. Gait is normal.  MS:  + Pain with ROM right hip. PSYCH:  Mood and affect NL. Judgement and insight NL. Pre-Operative Risk assessment using 2014 ACC/AHA guidelines     Emergent procedure No  Active Cardiac Condition No (decompensated HF, Arrhythmia, MI <3 weeks, severe valve disease)  Risk Level of Procedure Intermediate Risk (intraperitoneal, intrathoracic, HENT, orthopedic, or carotid endarterectomy, etc.)  Revised Cardiac Risk Index Risk factors: None  Measurement of Exercise Tolerance before Surgery >4 Yes. Not objectively measured. According to the 2014 ACC/AHA pre-operative risk assessment guidelines Elissa Jean is a low risk for major cardiac complications during a intermediate risk procedure and may continue as planned. Specific medication recommendations are listed below. Medications recommended to continue should be taken with a sip of water even when NPO.      Further

## 2023-04-26 NOTE — TELEPHONE ENCOUNTER
/Spoke to [facility], nurse reviewer, Aleshia Bhatia. Needs clinicals to state that we plan to do labral repair as well as hip scope. She also needs documentation that patient has participated in PT/home exercise program since November. Will addend note form 4/19/23 and re-send.     Rachel Martinez

## 2023-04-27 LAB
ALBUMIN SERPL-MCNC: 4.5 G/DL (ref 3.4–5)
ALBUMIN/GLOB SERPL: 1.6 {RATIO} (ref 1.1–2.2)
ALP SERPL-CCNC: 75 U/L (ref 40–129)
ALT SERPL-CCNC: 17 U/L (ref 10–40)
ANION GAP SERPL CALCULATED.3IONS-SCNC: 11 MMOL/L (ref 3–16)
AST SERPL-CCNC: 15 U/L (ref 15–37)
BILIRUB SERPL-MCNC: 0.4 MG/DL (ref 0–1)
BUN SERPL-MCNC: 22 MG/DL (ref 7–20)
CALCIUM SERPL-MCNC: 9.3 MG/DL (ref 8.3–10.6)
CHLORIDE SERPL-SCNC: 100 MMOL/L (ref 99–110)
CO2 SERPL-SCNC: 25 MMOL/L (ref 21–32)
CREAT SERPL-MCNC: 1.1 MG/DL (ref 0.8–1.3)
EST. AVERAGE GLUCOSE BLD GHB EST-MCNC: 122.6 MG/DL
GFR SERPLBLD CREATININE-BSD FMLA CKD-EPI: >60 ML/MIN/{1.73_M2}
GLUCOSE SERPL-MCNC: 102 MG/DL (ref 70–99)
HBA1C MFR BLD: 5.9 %
POTASSIUM SERPL-SCNC: 4.9 MMOL/L (ref 3.5–5.1)
PROT SERPL-MCNC: 7.4 G/DL (ref 6.4–8.2)
SODIUM SERPL-SCNC: 136 MMOL/L (ref 136–145)

## 2023-04-27 NOTE — TELEPHONE ENCOUNTER
RECEIVED CALL FROM [facility]  CASE IS SCHEDULED TO CLOSE ON 4/28/23  THIS IS INTENT TO DENY  23447 IS BUNDLED INTO 68585 AND 93389 IS MEDICALLY NOT NECESSARY  PLEASE CALL [phone]  REFERENCE-[id number]

## 2023-04-28 ENCOUNTER — TELEPHONE (OUTPATIENT)
Dept: ORTHOPEDIC SURGERY | Age: 63
End: 2023-04-28

## 2023-04-28 NOTE — TELEPHONE ENCOUNTER
Must file an appeal stating that Patient has had medication and PT for at least 3 months. Called patient. He has been doing at home stretching and strengthening exercises provided by his girl friend who is an RN since December at time of diagnosis. He has taken otc ibuprofen and medrol dose pack with only minimal relief of his symptoms. He meets the 4 criteria for surgery. Urgent appeal  phone number [phone].  Please fax telephone encounter from 4/28/23 to 21 [phone], [place]

## 2023-04-28 NOTE — TELEPHONE ENCOUNTER
----- Message from Lobo Pedroza MD sent at 10/13/2021 12:14 PM CDT -----  GI staff: please place recall in for colonoscopy in 10 years     Negative hepatitis panel.  Does need vaccination for hepatitis through primary RESUBMITTED CLINICALS

## 2023-04-28 NOTE — TELEPHONE ENCOUNTER
P2P was completed on 4/26/23 by EMILY. We were told to amend clinical documentation which was completed yesterday by MD.  We were told to resubmit clinicals once completed. Please resubmit clinicals.

## 2023-04-28 NOTE — TELEPHONE ENCOUNTER
Patient has been participating in home exercise / physical therapy / stretching program since December when he first met with Dr. Wendi Lopez in [place]. He denies any relief in his symptoms. He has been using over the counter medications and has had a medrol dose pack with minimal relief of his pain. This hip pain has persisted despite extensive nonoperative treatment focused on hip conditioning, mobility exercises, and activity modification. he has radiologic evidence of a labral tear. The patient meets the 4 criteria for arthroscopic surgery of the right hip. This includes groin dominant pain, reproducible on exam, with imaging confirming labral tear and LINH, and relief with an intra-articular freezing injection of the hip administered in the office through US-guidance. At this time, surgical fixation of his labral tear is medically necessary. It is significantly affecting his quality of life. He is avoiding the things he  enjoys doing as they increase his symptoms.      [facility]

## 2023-04-29 LAB — MRSA SPEC QL CULT: NORMAL

## 2023-05-01 LAB — MRSA SPEC QL CULT: NORMAL

## 2023-05-04 ENCOUNTER — TELEPHONE (OUTPATIENT)
Dept: ORTHOPEDIC SURGERY | Age: 63
End: 2023-05-04

## 2023-05-07 SDOH — HEALTH STABILITY: PHYSICAL HEALTH: ON AVERAGE, HOW MANY MINUTES DO YOU ENGAGE IN EXERCISE AT THIS LEVEL?: 30 MIN

## 2023-05-07 SDOH — HEALTH STABILITY: PHYSICAL HEALTH: ON AVERAGE, HOW MANY DAYS PER WEEK DO YOU ENGAGE IN MODERATE TO STRENUOUS EXERCISE (LIKE A BRISK WALK)?: 3 DAYS

## 2023-05-10 ENCOUNTER — OFFICE VISIT (OUTPATIENT)
Dept: ORTHOPEDIC SURGERY | Age: 63
End: 2023-05-10

## 2023-05-10 VITALS — BODY MASS INDEX: 27.02 KG/M2 | RESPIRATION RATE: 16 BRPM | HEIGHT: 71 IN | WEIGHT: 193 LBS

## 2023-05-10 DIAGNOSIS — M65.30 TRIGGER FINGER, ACQUIRED: Primary | ICD-10-CM

## 2023-05-10 RX ORDER — OMEGA-3 FATTY ACIDS/FISH OIL 300-1000MG
CAPSULE ORAL NIGHTLY
COMMUNITY

## 2023-05-10 NOTE — PROGRESS NOTES
Mr. Arron Yost is a 57 y.o. right handed man  who is seen today in Hand Surgical Consultation at the request of [facility]. He presents today regarding bilateral symptoms which have been present for approximately 3 years. A history of antecedent trauma or injury is Absent. He reports symptoms to include moderate pain and stiffness with frequent popping, catching or locking of the bilateral Middle Finger. Finger symptoms are Frequently worse in the morning or overnight. He reports moderate pain located at the base of the symptomatic finger(s). Symptoms show no change over time. Previous treatment has included conservative measures. He does not claim relation of his symptoms to his required work activities. He has not undergone any form of testing. I have today reviewed with Arron Yost the clinically relevant, past medical history, medications, allergies,  family history, social history, and Review Of Systems & I have documented any details relevant to today's presenting complaints in my history above. Mr. Xiomara Elizabeth's self-reported past medical history, medications, allergies,  family history, social history, and Review Of Systems have been scanned into the chart under the \"Media\" tab. Physical Exam:  Mr. Ellen Moreno most recent vitals:  Vitals  Respirations: 16  Height: 5' 11\" (180.3 cm)  Weight - Scale: 193 lb (87.5 kg)    He is well nourished, oriented to person, place & time. He demonstrates appropriate mood and affect as well as normal gait and station. Skin: Normal in appearance, Normal Color, and Free of Lesions Bilaterally   Digital range of motion is limited by pain bilaterally. Active triggering is noted upon flexion in the bilateral Middle Finger. There is an associated flexion contracture of the PIP joint. No other digit demonstrates evidence for stenosing tenosynovitis bilaterally.   Wrist range of motion is without significant limitation

## 2023-05-10 NOTE — PATIENT INSTRUCTIONS
Information & Instructions   After Finger, Hand, Wrist, or Elbow Injection    Gilford Slick B. Loleta Calico, MD    You have received an injection of local anesthetic (Bupivicaine without Epinephrine) for comfort & a steroid (Kenalog) for its strong anti-inflammatory effects. In order to give the medication a chance to reduce your inflammation and discomfort, it is recommended that you take it easy for a day or so. You may use your hand and arm as you feel comfortable, but you should avoid highly strenuous activity and heavy use for several days. Relief from the injection will often not begin for several days, and you may not feel full relief for up to one month. It is not uncommon to experience some local discomfort or pain at or around the injection site for a few days. To relieve these symptoms you may do the following if you feel necessary:       Apply ice to the affected area 20 minutes on and 20 minutes off. Do not apply ice directly to the skin. Use a thin layer (T-shirt, pillowcase, towel, etc.) to protect the skin. - If allowed by your other medical physicians, you may take -     2 Tylenol extra strength tablets every 4-6 hours       1-2 Aleve tablets twice a day     2-3 Advil tablets two to three times a day    If you are diabetic, the steroid medication may increase your blood sugar, so you are advised to monitor your sugar more closely so you can adjust it accordingly for a few days following your injection. If you need assistance with the control of you blood sugar, please contact you primary care physician for further advice. I will request that you please call the office one month after your injection at [phone] if you have not experienced relief of your symptoms (unless I have instructed you otherwise). If your injection has given you good relief of you symptoms as expected, then you only need to call the office if your symptoms return.

## 2023-05-11 ENCOUNTER — OFFICE VISIT (OUTPATIENT)
Dept: ORTHOPEDIC SURGERY | Age: 63
End: 2023-05-11

## 2023-05-11 VITALS — WEIGHT: 193 LBS | HEIGHT: 71 IN | BODY MASS INDEX: 27.02 KG/M2

## 2023-05-11 DIAGNOSIS — S73.191D TEAR OF RIGHT ACETABULAR LABRUM, SUBSEQUENT ENCOUNTER: Primary | ICD-10-CM

## 2023-05-11 DIAGNOSIS — M25.851 FEMOROACETABULAR IMPINGEMENT OF RIGHT HIP: ICD-10-CM

## 2023-05-11 RX ORDER — SENNOSIDES 8.6 MG
1 TABLET ORAL 2 TIMES DAILY
Qty: 14 TABLET | Refills: 0 | Status: SHIPPED | OUTPATIENT
Start: 2023-05-11 | End: 2023-05-18

## 2023-05-11 RX ORDER — HYDROCODONE BITARTRATE AND ACETAMINOPHEN 5; 325 MG/1; MG/1
1 TABLET ORAL EVERY 4 HOURS PRN
Qty: 12 TABLET | Refills: 0 | Status: SHIPPED | OUTPATIENT
Start: 2023-05-11 | End: 2023-05-16

## 2023-05-11 RX ORDER — CYCLOBENZAPRINE HCL 10 MG
10 TABLET ORAL 3 TIMES DAILY PRN
Qty: 21 TABLET | Refills: 0 | Status: SHIPPED | OUTPATIENT
Start: 2023-05-11 | End: 2023-05-18

## 2023-05-11 RX ORDER — NAPROXEN 500 MG/1
500 TABLET ORAL 2 TIMES DAILY WITH MEALS
Qty: 28 TABLET | Refills: 0 | Status: SHIPPED | OUTPATIENT
Start: 2023-05-11 | End: 2023-05-25

## 2023-05-11 RX ORDER — ASPIRIN 81 MG/1
81 TABLET ORAL 2 TIMES DAILY
Qty: 28 TABLET | Refills: 0 | Status: SHIPPED | OUTPATIENT
Start: 2023-05-11 | End: 2023-05-25

## 2023-05-12 DIAGNOSIS — S73.191D TEAR OF RIGHT ACETABULAR LABRUM, SUBSEQUENT ENCOUNTER: Primary | ICD-10-CM

## 2023-05-12 DIAGNOSIS — M25.851 FEMOROACETABULAR IMPINGEMENT OF RIGHT HIP: ICD-10-CM

## 2023-05-12 NOTE — PROGRESS NOTES
Chief Complaint  Hip Pain (Pre op right hip scope)      History of Present Illness:  Antonio Recinos is a pleasant 57 y.o. male who presents for his pre-operative visit for an upcoming right hip arthroscopy. He underwent his pre operative testing with no concerns. Medical History:  Patient's medications, allergies, past medical, surgical, social and family histories were reviewed and updated as appropriate. Pain Assessment  Location of Pain: Hip  Location Modifiers: Right  Severity of Pain: 4  Quality of Pain: Dull  Frequency of Pain: Constant  Date Pain First Started: 11/26/22  Aggravating Factors: Squatting, Other (Comment) (sitting)  Limiting Behavior: Yes  Relieving Factors: Rest  Result of Injury: Yes  Work-Related Injury: No  ROS: Review of systems reviewed from Patient History Form completed today and available in the patient's chart under the Media tab. Pertinent items are noted in HPI  Review of systems reviewed from Patient History Form completed today and available in the patient's chart under the Media tab. Vital Signs:  Ht 5' 11\" (1.803 m)   Wt 193 lb (87.5 kg)   BMI 26.92 kg/m²         Neuro: Alert & oriented x 3,  normal,  no focal deficits noted. Normal affect. Eyes: sclera clear  Ears: Normal external ear  Mouth:  No perioral lesions  Pulm: Respirations unlabored and regular  Pulse: Extremities well perfused. 2+ peripheral pulses. Skin: Warm. No ulcerations. Constitutional: The physical examination finds the patient to be well-developed and well-nourished. The patient is alert and oriented x3 and was cooperative throughout the visit. Hip Examination: right    Skin/Inspection: No skin lesions, cellulitis, or extreme edema in the lower extremities. Standing/Walking: normal gait, negative Trendelenburg sign.       Supine/Side Lying Exam: Non tender around the major bony prominences  full range with pain  FADIR Positive  SARAH Negative  Resisted Abduction  5/5

## 2023-05-15 ENCOUNTER — TELEPHONE (OUTPATIENT)
Dept: ORTHOPEDIC SURGERY | Age: 63
End: 2023-05-15

## 2023-05-15 DIAGNOSIS — M25.851 FEMOROACETABULAR IMPINGEMENT OF RIGHT HIP: ICD-10-CM

## 2023-05-15 DIAGNOSIS — S73.191D TEAR OF RIGHT ACETABULAR LABRUM, SUBSEQUENT ENCOUNTER: Primary | ICD-10-CM

## 2023-05-15 NOTE — TELEPHONE ENCOUNTER
Medical Facility Question     Facility Name: HAS APPROVAL FOR REHAB BEEN RCV'D AND WHERE IS THE Pt STAYING?     Contact Name: Todd Robert \"Romario\"  Contact Number: [phone]  Request or Information: PLEASE CALL AS SX IS TOMORROW

## 2023-05-16 ENCOUNTER — ANESTHESIA EVENT (OUTPATIENT)
Dept: OPERATING ROOM | Age: 63
End: 2023-05-16
Payer: COMMERCIAL

## 2023-05-16 ENCOUNTER — HOSPITAL ENCOUNTER (OUTPATIENT)
Age: 63
Setting detail: OUTPATIENT SURGERY
Discharge: HOME | End: 2023-05-16
Attending: ORTHOPAEDIC SURGERY | Admitting: ORTHOPAEDIC SURGERY
Payer: COMMERCIAL

## 2023-05-16 ENCOUNTER — TELEPHONE (OUTPATIENT)
Dept: ORTHOPEDIC SURGERY | Age: 63
End: 2023-05-16

## 2023-05-16 ENCOUNTER — APPOINTMENT (OUTPATIENT)
Dept: GENERAL RADIOLOGY | Age: 63
End: 2023-05-16
Attending: ORTHOPAEDIC SURGERY
Payer: COMMERCIAL

## 2023-05-16 ENCOUNTER — ANESTHESIA (OUTPATIENT)
Dept: OPERATING ROOM | Age: 63
End: 2023-05-16
Payer: COMMERCIAL

## 2023-05-16 VITALS
HEIGHT: 71 IN | OXYGEN SATURATION: 99 % | SYSTOLIC BLOOD PRESSURE: 150 MMHG | RESPIRATION RATE: 19 BRPM | WEIGHT: 192 LBS | BODY MASS INDEX: 26.88 KG/M2 | DIASTOLIC BLOOD PRESSURE: 81 MMHG | TEMPERATURE: 97.4 F | HEART RATE: 76 BPM

## 2023-05-16 PROCEDURE — 6360000002 HC RX W HCPCS: Performed by: NURSE ANESTHETIST, CERTIFIED REGISTERED

## 2023-05-16 PROCEDURE — 7100000010 HC PHASE II RECOVERY - FIRST 15 MIN: Performed by: ORTHOPAEDIC SURGERY

## 2023-05-16 PROCEDURE — 6360000002 HC RX W HCPCS: Performed by: FAMILY MEDICINE

## 2023-05-16 PROCEDURE — 2500000003 HC RX 250 WO HCPCS: Performed by: FAMILY MEDICINE

## 2023-05-16 PROCEDURE — 3600000015 HC SURGERY LEVEL 5 ADDTL 15MIN: Performed by: ORTHOPAEDIC SURGERY

## 2023-05-16 PROCEDURE — 6370000000 HC RX 637 (ALT 250 FOR IP): Performed by: ORTHOPAEDIC SURGERY

## 2023-05-16 PROCEDURE — 3600000004 HC SURGERY LEVEL 4 BASE: Performed by: ORTHOPAEDIC SURGERY

## 2023-05-16 PROCEDURE — 7100000000 HC PACU RECOVERY - FIRST 15 MIN: Performed by: ORTHOPAEDIC SURGERY

## 2023-05-16 PROCEDURE — 2709999900 HC NON-CHARGEABLE SUPPLY: Performed by: ORTHOPAEDIC SURGERY

## 2023-05-16 PROCEDURE — 2500000003 HC RX 250 WO HCPCS: Performed by: ORTHOPAEDIC SURGERY

## 2023-05-16 PROCEDURE — 2720000010 HC SURG SUPPLY STERILE: Performed by: ORTHOPAEDIC SURGERY

## 2023-05-16 PROCEDURE — 73501 X-RAY EXAM HIP UNI 1 VIEW: CPT

## 2023-05-16 PROCEDURE — 6360000002 HC RX W HCPCS

## 2023-05-16 PROCEDURE — C1788 PORT, INDWELLING, IMP: HCPCS | Performed by: ORTHOPAEDIC SURGERY

## 2023-05-16 PROCEDURE — 64447 NJX AA&/STRD FEMORAL NRV IMG: CPT | Performed by: FAMILY MEDICINE

## 2023-05-16 PROCEDURE — A4217 STERILE WATER/SALINE, 500 ML: HCPCS | Performed by: ORTHOPAEDIC SURGERY

## 2023-05-16 PROCEDURE — 6370000000 HC RX 637 (ALT 250 FOR IP)

## 2023-05-16 PROCEDURE — 6360000002 HC RX W HCPCS: Performed by: ORTHOPAEDIC SURGERY

## 2023-05-16 PROCEDURE — 2500000003 HC RX 250 WO HCPCS: Performed by: NURSE ANESTHETIST, CERTIFIED REGISTERED

## 2023-05-16 PROCEDURE — 7100000001 HC PACU RECOVERY - ADDTL 15 MIN: Performed by: ORTHOPAEDIC SURGERY

## 2023-05-16 PROCEDURE — C1713 ANCHOR/SCREW BN/BN,TIS/BN: HCPCS | Performed by: ORTHOPAEDIC SURGERY

## 2023-05-16 PROCEDURE — 3600000005 HC SURGERY LEVEL 5 BASE: Performed by: ORTHOPAEDIC SURGERY

## 2023-05-16 PROCEDURE — 7100000011 HC PHASE II RECOVERY - ADDTL 15 MIN: Performed by: ORTHOPAEDIC SURGERY

## 2023-05-16 PROCEDURE — 2580000003 HC RX 258: Performed by: ORTHOPAEDIC SURGERY

## 2023-05-16 PROCEDURE — 3600000014 HC SURGERY LEVEL 4 ADDTL 15MIN: Performed by: ORTHOPAEDIC SURGERY

## 2023-05-16 PROCEDURE — 2580000003 HC RX 258

## 2023-05-16 PROCEDURE — 3700000000 HC ANESTHESIA ATTENDED CARE: Performed by: ORTHOPAEDIC SURGERY

## 2023-05-16 PROCEDURE — 3700000001 HC ADD 15 MINUTES (ANESTHESIA): Performed by: ORTHOPAEDIC SURGERY

## 2023-05-16 DEVICE — GRAFT HUM TISS L23CM FLD DIA6MM FRZN DBL STRND POST: Type: IMPLANTABLE DEVICE | Site: HIP | Status: FUNCTIONAL

## 2023-05-16 DEVICE — 1.8MM Q-FIX ALL SUTURE ANCHOR
Type: IMPLANTABLE DEVICE | Site: HIP | Status: FUNCTIONAL
Brand: Q-FIX

## 2023-05-16 DEVICE — NANOTACK SUTURE ANCHOR 1.4MM WITH FLEX INSERTER
Type: IMPLANTABLE DEVICE | Site: HIP | Status: FUNCTIONAL
Brand: NANOTACK

## 2023-05-16 RX ORDER — LABETALOL HYDROCHLORIDE 5 MG/ML
10 INJECTION, SOLUTION INTRAVENOUS
Status: DISCONTINUED | OUTPATIENT
Start: 2023-05-16 | End: 2023-05-16 | Stop reason: HOSPADM

## 2023-05-16 RX ORDER — ONDANSETRON 2 MG/ML
INJECTION INTRAMUSCULAR; INTRAVENOUS PRN
Status: DISCONTINUED | OUTPATIENT
Start: 2023-05-16 | End: 2023-05-16 | Stop reason: SDUPTHER

## 2023-05-16 RX ORDER — SODIUM CHLORIDE, SODIUM LACTATE, POTASSIUM CHLORIDE, CALCIUM CHLORIDE 600; 310; 30; 20 MG/100ML; MG/100ML; MG/100ML; MG/100ML
INJECTION, SOLUTION INTRAVENOUS CONTINUOUS
Status: DISCONTINUED | OUTPATIENT
Start: 2023-05-16 | End: 2023-05-16 | Stop reason: HOSPADM

## 2023-05-16 RX ORDER — PROCHLORPERAZINE EDISYLATE 5 MG/ML
5 INJECTION INTRAMUSCULAR; INTRAVENOUS
Status: DISCONTINUED | OUTPATIENT
Start: 2023-05-16 | End: 2023-05-16 | Stop reason: HOSPADM

## 2023-05-16 RX ORDER — ROCURONIUM BROMIDE 10 MG/ML
INJECTION, SOLUTION INTRAVENOUS PRN
Status: DISCONTINUED | OUTPATIENT
Start: 2023-05-16 | End: 2023-05-16 | Stop reason: SDUPTHER

## 2023-05-16 RX ORDER — PROPOFOL 10 MG/ML
INJECTION, EMULSION INTRAVENOUS PRN
Status: DISCONTINUED | OUTPATIENT
Start: 2023-05-16 | End: 2023-05-16 | Stop reason: SDUPTHER

## 2023-05-16 RX ORDER — LIDOCAINE HYDROCHLORIDE 10 MG/ML
0.5 INJECTION, SOLUTION EPIDURAL; INFILTRATION; INTRACAUDAL; PERINEURAL ONCE
Status: DISCONTINUED | OUTPATIENT
Start: 2023-05-16 | End: 2023-05-16 | Stop reason: HOSPADM

## 2023-05-16 RX ORDER — FENTANYL CITRATE 50 UG/ML
INJECTION, SOLUTION INTRAMUSCULAR; INTRAVENOUS PRN
Status: DISCONTINUED | OUTPATIENT
Start: 2023-05-16 | End: 2023-05-16 | Stop reason: SDUPTHER

## 2023-05-16 RX ORDER — HYDROCODONE BITARTRATE AND ACETAMINOPHEN 5; 325 MG/1; MG/1
1 TABLET ORAL
Status: COMPLETED | OUTPATIENT
Start: 2023-05-16 | End: 2023-05-16

## 2023-05-16 RX ORDER — EPHEDRINE SULFATE/0.9% NACL/PF 50 MG/5 ML
SYRINGE (ML) INTRAVENOUS PRN
Status: DISCONTINUED | OUTPATIENT
Start: 2023-05-16 | End: 2023-05-16 | Stop reason: SDUPTHER

## 2023-05-16 RX ORDER — FENTANYL CITRATE 50 UG/ML
INJECTION, SOLUTION INTRAMUSCULAR; INTRAVENOUS
Status: COMPLETED
Start: 2023-05-16 | End: 2023-05-16

## 2023-05-16 RX ORDER — IPRATROPIUM BROMIDE AND ALBUTEROL SULFATE 2.5; .5 MG/3ML; MG/3ML
1 SOLUTION RESPIRATORY (INHALATION)
Status: DISCONTINUED | OUTPATIENT
Start: 2023-05-16 | End: 2023-05-16 | Stop reason: HOSPADM

## 2023-05-16 RX ORDER — MINERAL OIL
OIL (ML) MISCELLANEOUS
Status: COMPLETED | OUTPATIENT
Start: 2023-05-16 | End: 2023-05-16

## 2023-05-16 RX ORDER — SODIUM CHLORIDE 0.9 % (FLUSH) 0.9 %
5-40 SYRINGE (ML) INJECTION PRN
Status: DISCONTINUED | OUTPATIENT
Start: 2023-05-16 | End: 2023-05-16 | Stop reason: HOSPADM

## 2023-05-16 RX ORDER — HYDROCODONE BITARTRATE AND ACETAMINOPHEN 5; 325 MG/1; MG/1
TABLET ORAL
Status: COMPLETED
Start: 2023-05-16 | End: 2023-05-16

## 2023-05-16 RX ORDER — BUPIVACAINE HYDROCHLORIDE 5 MG/ML
INJECTION, SOLUTION EPIDURAL; INTRACAUDAL
Status: COMPLETED | OUTPATIENT
Start: 2023-05-16 | End: 2023-05-16

## 2023-05-16 RX ORDER — TRANEXAMIC ACID 10 MG/ML
1000 INJECTION, SOLUTION INTRAVENOUS ONCE
Status: COMPLETED | OUTPATIENT
Start: 2023-05-16 | End: 2023-05-16

## 2023-05-16 RX ORDER — LIDOCAINE HYDROCHLORIDE 20 MG/ML
INJECTION, SOLUTION INFILTRATION; PERINEURAL PRN
Status: DISCONTINUED | OUTPATIENT
Start: 2023-05-16 | End: 2023-05-16 | Stop reason: SDUPTHER

## 2023-05-16 RX ORDER — MEPERIDINE HYDROCHLORIDE 25 MG/ML
12.5 INJECTION INTRAMUSCULAR; INTRAVENOUS; SUBCUTANEOUS EVERY 5 MIN PRN
Status: DISCONTINUED | OUTPATIENT
Start: 2023-05-16 | End: 2023-05-16 | Stop reason: HOSPADM

## 2023-05-16 RX ORDER — MIDAZOLAM HYDROCHLORIDE 1 MG/ML
INJECTION INTRAMUSCULAR; INTRAVENOUS PRN
Status: DISCONTINUED | OUTPATIENT
Start: 2023-05-16 | End: 2023-05-16 | Stop reason: SDUPTHER

## 2023-05-16 RX ORDER — SODIUM CHLORIDE 9 MG/ML
INJECTION, SOLUTION INTRAVENOUS
Status: COMPLETED
Start: 2023-05-16 | End: 2023-05-16

## 2023-05-16 RX ORDER — ONDANSETRON 2 MG/ML
4 INJECTION INTRAMUSCULAR; INTRAVENOUS
Status: DISCONTINUED | OUTPATIENT
Start: 2023-05-16 | End: 2023-05-16 | Stop reason: HOSPADM

## 2023-05-16 RX ORDER — ACETAMINOPHEN 650 MG/1
650 SUPPOSITORY RECTAL
Status: DISCONTINUED | OUTPATIENT
Start: 2023-05-16 | End: 2023-05-16 | Stop reason: HOSPADM

## 2023-05-16 RX ORDER — KETOROLAC TROMETHAMINE 30 MG/ML
INJECTION, SOLUTION INTRAMUSCULAR; INTRAVENOUS PRN
Status: DISCONTINUED | OUTPATIENT
Start: 2023-05-16 | End: 2023-05-16 | Stop reason: SDUPTHER

## 2023-05-16 RX ORDER — LORAZEPAM 2 MG/ML
0.5 INJECTION INTRAMUSCULAR
Status: DISCONTINUED | OUTPATIENT
Start: 2023-05-16 | End: 2023-05-16 | Stop reason: HOSPADM

## 2023-05-16 RX ORDER — FENTANYL CITRATE 50 UG/ML
25 INJECTION, SOLUTION INTRAMUSCULAR; INTRAVENOUS EVERY 5 MIN PRN
Status: DISCONTINUED | OUTPATIENT
Start: 2023-05-16 | End: 2023-05-16 | Stop reason: HOSPADM

## 2023-05-16 RX ORDER — HYDROMORPHONE HCL 110MG/55ML
0.5 PATIENT CONTROLLED ANALGESIA SYRINGE INTRAVENOUS EVERY 5 MIN PRN
Status: COMPLETED | OUTPATIENT
Start: 2023-05-16 | End: 2023-05-16

## 2023-05-16 RX ORDER — SODIUM CHLORIDE 9 MG/ML
25 INJECTION, SOLUTION INTRAVENOUS PRN
Status: DISCONTINUED | OUTPATIENT
Start: 2023-05-16 | End: 2023-05-16 | Stop reason: HOSPADM

## 2023-05-16 RX ORDER — SODIUM CHLORIDE 0.9 % (FLUSH) 0.9 %
5-40 SYRINGE (ML) INJECTION EVERY 12 HOURS SCHEDULED
Status: DISCONTINUED | OUTPATIENT
Start: 2023-05-16 | End: 2023-05-16 | Stop reason: HOSPADM

## 2023-05-16 RX ORDER — DEXAMETHASONE SODIUM PHOSPHATE 4 MG/ML
INJECTION, SOLUTION INTRA-ARTICULAR; INTRALESIONAL; INTRAMUSCULAR; INTRAVENOUS; SOFT TISSUE PRN
Status: DISCONTINUED | OUTPATIENT
Start: 2023-05-16 | End: 2023-05-16 | Stop reason: SDUPTHER

## 2023-05-16 RX ADMIN — FENTANYL CITRATE 25 MCG: 50 INJECTION, SOLUTION INTRAMUSCULAR; INTRAVENOUS at 12:07

## 2023-05-16 RX ADMIN — HYDROCODONE BITARTRATE AND ACETAMINOPHEN 1 TABLET: 5; 325 TABLET ORAL at 12:33

## 2023-05-16 RX ADMIN — DEXAMETHASONE SODIUM PHOSPHATE 8 MG: 4 INJECTION, SOLUTION INTRAMUSCULAR; INTRAVENOUS at 08:05

## 2023-05-16 RX ADMIN — SODIUM CHLORIDE 500 ML: 9 INJECTION, SOLUTION INTRAVENOUS at 11:51

## 2023-05-16 RX ADMIN — SODIUM CHLORIDE, POTASSIUM CHLORIDE, SODIUM LACTATE AND CALCIUM CHLORIDE: 600; 310; 30; 20 INJECTION, SOLUTION INTRAVENOUS at 07:44

## 2023-05-16 RX ADMIN — PROPOFOL 50 MG: 10 INJECTION, EMULSION INTRAVENOUS at 07:13

## 2023-05-16 RX ADMIN — ONDANSETRON 4 MG: 2 INJECTION INTRAMUSCULAR; INTRAVENOUS at 11:00

## 2023-05-16 RX ADMIN — LIDOCAINE HYDROCHLORIDE 100 MG: 20 INJECTION, SOLUTION INFILTRATION; PERINEURAL at 07:52

## 2023-05-16 RX ADMIN — Medication 10 MG: at 08:39

## 2023-05-16 RX ADMIN — FENTANYL CITRATE 50 MCG: 50 INJECTION, SOLUTION INTRAMUSCULAR; INTRAVENOUS at 07:52

## 2023-05-16 RX ADMIN — SODIUM CHLORIDE, POTASSIUM CHLORIDE, SODIUM LACTATE AND CALCIUM CHLORIDE: 600; 310; 30; 20 INJECTION, SOLUTION INTRAVENOUS at 07:26

## 2023-05-16 RX ADMIN — MIDAZOLAM 2 MG: 1 INJECTION INTRAMUSCULAR; INTRAVENOUS at 07:49

## 2023-05-16 RX ADMIN — HYDROMORPHONE HYDROCHLORIDE 0.5 MG: 2 INJECTION, SOLUTION INTRAMUSCULAR; INTRAVENOUS; SUBCUTANEOUS at 11:37

## 2023-05-16 RX ADMIN — HYDROMORPHONE HYDROCHLORIDE 0.5 MG: 2 INJECTION, SOLUTION INTRAMUSCULAR; INTRAVENOUS; SUBCUTANEOUS at 11:45

## 2023-05-16 RX ADMIN — BUPIVACAINE HYDROCHLORIDE 20 ML: 5 INJECTION EPIDURAL; INTRACAUDAL; PERINEURAL at 07:12

## 2023-05-16 RX ADMIN — PROPOFOL 200 MG: 10 INJECTION, EMULSION INTRAVENOUS at 07:52

## 2023-05-16 RX ADMIN — FENTANYL CITRATE 50 MCG: 50 INJECTION, SOLUTION INTRAMUSCULAR; INTRAVENOUS at 08:05

## 2023-05-16 RX ADMIN — SUGAMMADEX 200 MG: 100 INJECTION, SOLUTION INTRAVENOUS at 11:00

## 2023-05-16 RX ADMIN — VANCOMYCIN HYDROCHLORIDE 1500 MG: 1.5 INJECTION, POWDER, LYOPHILIZED, FOR SOLUTION INTRAVENOUS at 07:47

## 2023-05-16 RX ADMIN — FENTANYL CITRATE 25 MCG: 50 INJECTION, SOLUTION INTRAMUSCULAR; INTRAVENOUS at 12:15

## 2023-05-16 RX ADMIN — KETOROLAC TROMETHAMINE 30 MG: 30 INJECTION, SOLUTION INTRAMUSCULAR; INTRAVENOUS at 11:00

## 2023-05-16 RX ADMIN — ROCURONIUM BROMIDE 50 MG: 10 INJECTION INTRAVENOUS at 07:52

## 2023-05-16 RX ADMIN — VANCOMYCIN HYDROCHLORIDE 1500 MG: 1.5 INJECTION, POWDER, LYOPHILIZED, FOR SOLUTION INTRAVENOUS at 07:27

## 2023-05-16 RX ADMIN — TRANEXAMIC ACID 1000 MG: 10 INJECTION, SOLUTION INTRAVENOUS at 08:00

## 2023-05-16 ASSESSMENT — PAIN DESCRIPTION - LOCATION
LOCATION: HIP
LOCATION: ANKLE;HIP
LOCATION: HIP

## 2023-05-16 ASSESSMENT — PAIN SCALES - GENERAL
PAINLEVEL_OUTOF10: 6
PAINLEVEL_OUTOF10: 5
PAINLEVEL_OUTOF10: 6
PAINLEVEL_OUTOF10: 8
PAINLEVEL_OUTOF10: 8
PAINLEVEL_OUTOF10: 6

## 2023-05-16 ASSESSMENT — ENCOUNTER SYMPTOMS: SHORTNESS OF BREATH: 0

## 2023-05-16 ASSESSMENT — PAIN - FUNCTIONAL ASSESSMENT: PAIN_FUNCTIONAL_ASSESSMENT: 0-10

## 2023-05-16 ASSESSMENT — PAIN DESCRIPTION - ORIENTATION
ORIENTATION: MID
ORIENTATION: RIGHT;MID

## 2023-05-16 NOTE — PROGRESS NOTES
Discharge instructions reviewed with pt & family at bedside. Understanding verbalized. Written copy to pt.

## 2023-05-16 NOTE — ANESTHESIA PROCEDURE NOTES
Peripheral Block    Patient location during procedure: pre-op  Reason for block: post-op pain management and at surgeon's request  Start time: 5/16/2023 7:12 AM  End time: 5/16/2023 7:12 AM  Staffing  Performed: anesthesiologist   Anesthesiologist: Vivien Varela MD  Preanesthetic Checklist  Completed: patient identified, IV checked, site marked, risks and benefits discussed, surgical/procedural consents, equipment checked, pre-op evaluation, timeout performed, anesthesia consent given, oxygen available, monitors applied/VS acknowledged, fire risk safety assessment completed and verbalized and blood product R/B/A discussed and consented  Peripheral Block   Patient position: supine  Prep: ChloraPrep  Provider prep: mask and sterile gloves  Patient monitoring: cardiac monitor, continuous pulse ox, frequent blood pressure checks and IV access  Block type: PENG  Laterality: right  Injection technique: single-shot  Guidance: ultrasound guided  Local infiltration: lidocaine  Infiltration strength: 1 %  Local infiltration: lidocaine  Dose: 3 mL    Needle   Needle type: insulated echogenic nerve stimulator needle   Needle gauge: 21 G  Needle localization: ultrasound guidance  Needle length: 10 cm  Assessment   Injection assessment: negative aspiration for heme, no paresthesia on injection and local visualized surrounding nerve on ultrasound  Paresthesia pain: none  Slow fractionated injection: yes  Hemodynamics: stable  Real-time US image taken/store: yes  Outcomes: uncomplicated    Additional Notes  Right PENG block  0.5% 20 ml          Medications Administered  bupivacaine (MARCAINE) PF injection 0.5% - Perineural   20 mL - 5/16/2023 7:12:00 AM

## 2023-05-16 NOTE — PROGRESS NOTES
Dr Marlon Aponte here to see pt. Pt c/o rt ankle pain.  explained d/t positioning in boot in surgery.

## 2023-05-16 NOTE — ANESTHESIA POSTPROCEDURE EVALUATION
Department of Anesthesiology  Postprocedure Note    Patient: Milton Dobbins  MRN: [id number]  YOB: 1960  Date of evaluation: 5/16/2023      Procedure Summary     Date: 05/16/23 Room / Location: [address]    Anesthesia Start: 3413 Anesthesia Stop: 1129    Procedure: RIGHT HIP DIAGNOSTIC ARTHROSCOPY, LABRAL REPAIR, FEMOROACETABULAR IMPINGEMENT DECOMPRESSION -BLOCK(PENG); LOCAL (ORTHOMIX) (Right: Hip) Diagnosis:       Tear of right acetabular labrum, subsequent encounter      Femoroacetabular impingement of right hip      (Tear of right acetabular labrum, subsequent encounter [S73.191D])      (Femoroacetabular impingement of right hip [M25.851])    Surgeons: Jasmin Delgado MD Responsible Provider: Lorri Lester MD    Anesthesia Type: general ASA Status: 2          Anesthesia Type: No value filed.     Ravi Phase I: Ravi Score: 10    Ravi Phase II:        Anesthesia Post Evaluation    Patient location during evaluation: PACU  Level of consciousness: awake  Complications: no  Multimodal analgesia pain management approach

## 2023-05-16 NOTE — ANESTHESIA PRE PROCEDURE
Department of Anesthesiology  Preprocedure Note       Name:  Stephon Tam   Age:  57 y.o.  :  1960                                          MRN:  [id number]         Date:  2023      Surgeon: Tracey Blanchard):  Rick Downs MD    Procedure: RIGHT HIP DIAGNOSTIC ARTHROSCOPY, LABRAL REPAIR, POSSIBLE FEMOROACETABULAR IMPINGEMENT DECOMPRESSION -BLOCK(PENG); LOCAL (ORTHOMIX) (Right: Hip)    Medications prior to admission:   Prior to Admission medications    Medication Sig Start Date End Date Taking? Authorizing Provider   aspirin EC 81 MG EC tablet Take 1 tablet by mouth 2 times daily for 14 days 23  Rick Downs MD   cyclobenzaprine (FLEXERIL) 10 MG tablet Take 1 tablet by mouth 3 times daily as needed for Muscle spasms 23  Rick Downs MD   HYDROcodone-acetaminophen (NORCO) 5-325 MG per tablet Take 1 tablet by mouth every 4 hours as needed for Pain for up to 5 days. Max Daily Amount: 6 tablets 23  Rick Downs MD   naproxen (NAPROSYN) 500 MG tablet Take 1 tablet by mouth 2 times daily (with meals) for 14 days 23  Rick Downs MD   senna (SENOKOT) 8.6 MG TABS tablet Take 1 tablet by mouth 2 times daily for 7 days 23  iRck Downs MD   Omega 3 1000 MG CAPS Take by mouth nightly    Historical Provider, MD   lisinopril (PRINIVIL;ZESTRIL) 10 MG tablet TAKE 1 TABLET BY MOUTH DAILY  Patient taking differently: nightly TAKE 1 TABLET BY MOUTH DAILY 3/27/23   Kane Loco DO   atorvastatin (LIPITOR) 40 MG tablet Take one tablet by mouth nightly 8/3/22   Kane Loco DO   clobetasol (TEMOVATE) 0.05 % cream Apply topically 2 times daily for 10 days as needed. Do not use more than 10 days.   Patient not taking: Reported on 2023   Kane Loco DO   Cetirizine HCl (ZYRTEC ALLERGY PO)   Take 10 mg by mouth daily     Historical Provider, MD       Current medications:    No current facility-administered medications

## 2023-05-16 NOTE — PROGRESS NOTES
Awake & alert. Respirations easy & symmetrical. Dressing to rt hip dry & intact. Rt pedal & posterior tibial pulses strong. Good movement & sensation in toes. States pain is relieved to level 4/10.

## 2023-05-16 NOTE — PROGRESS NOTES
This RN, Dr. Adore Pinon anesthesia tech at patient bedside. Patient timeout completed. Patient placed on monitor and 2L O2 via NC for comfort.

## 2023-05-16 NOTE — DISCHARGE INSTRUCTIONS
tolerated. Progress slowly to a regular diet unless your physician has instructed you otherwise. Drink plenty of water. If persistent nausea and vomiting becomes a problem, call your physician. Coughing, sore throat and muscle aches are other side effects of anesthesia, and should improve with time. Do not drive or operate machinery while taking narcotics.

## 2023-05-16 NOTE — TELEPHONE ENCOUNTER
Telephone call  POD0  right hip scope for LINH  Spoke with Patient  Patient comfortable, eating, and voiding  No major pain N/V  Re discussed post op instructions, weight bearing, and medications    All questions answered to satisfaction    Routine follow-up in my office in 48 hours    Sincerely,  [employer] [place], [place]      05/16/23  6:36 PM

## 2023-05-16 NOTE — OP NOTE
Operative Note      Patient: Sterling Boyce  YOB: 1960  MRN: [id number]    Date of Procedure: 5/16/2023    Pre-Op Diagnosis Codes:     * Tear of right acetabular labrum, subsequent encounter [S73.191D]     * Femoroacetabular impingement of right hip [M25.851]    Post-Op Diagnosis: Same       Procedure(s):  PROCEDURE:  RIGHT HIP ARTHROSCOPY & SYNOVECTOMY,  ACETABULOPLASTY (25034), ,  SUBSPINE DECOMPRESSION (40509 to 81732),    LABRAL RECONSTRUCTION (69641 X  2 units),  FEMOROPLASTY/OSTEOCHONDROPLASTY (29758),   CAPSULAR  PLICATION (33126 to 90517)     Surgeon(s):  Tricia Kent MD    Assistant:   Surgical Assistant: Pepe Winkler    Anesthesia: General    Estimated Blood Loss (mL): Minimal    Complications: None    Specimens:   * No specimens in log *    Implants:  Implant Name Type Inv. Item Serial No.  Lot No. LRB No. Used Action   ANCHOR SUT 1.4MM W/ FLX INSRT HI STRENGTH FLXIBLE DEL FOR - [id number]  ANCHOR SUT 1.4MM W/ FLX INSRT HI STRENGTH FLXIBLE DEL FOR  [employer]- [id number] Right 1 Implanted   ANCHOR SUT 1.4MM W/ FLX INSRT HI STRENGTH FLXIBLE DEL FOR - [id number]  ANCHOR SUT 1.4MM W/ FLX INSRT HI STRENGTH FLXIBLE DEL FOR  [employer]- [id number] Right 1 Implanted   ANCHOR SUT 1.4MM W/ FLX INSRT HI STRENGTH FLXIBLE DEL FOR - [id number]  ANCHOR SUT 1.4MM W/ FLX INSRT HI STRENGTH FLXIBLE DEL FOR  [employer]- [id number] Right 1 Implanted   GRAFT HUM TISS L23CM FLD DIA6MM FRZN DBL STRND POST - [id number]  GRAFT HUM TISS L23CM FLD DIA6MM FRZN DBL STRND POST [id number] [id number]  Right 1 Implanted   ANCHOR SUT DIA1. 8MM FOR ACET LABRAL REP Q-FIX - [id number]  ANCHOR SUT DIA1. 8MM FOR ACET LABRAL REP Q-FIX  [employer]WD [id number] Right 1 Implanted   ANCHOR SUT 1.4MM W/ FLX INSRT HI STRENGTH FLXIBLE DEL FOR - [id number]  ANCHOR SUT 1.4MM W/ FLX INSRT HI STRENGTH FLXIBLE DEL FOR  [employer]WD [id number] Right 1 Implanted   ANCHOR

## 2023-05-16 NOTE — H&P
H&P Update     An electronic and hard copy history and physical was reviewed in the patient's chart. Date of Surgery Update: May 16, 2023  Erik Ramirez was seen and examined. Patient identified by surgeon; surgical site was confirmed by patient and surgeon. ?  Signed By: Sincerely,    Soheila Bowen  [address] [place] [address]  Email: [email or web address]  Office: [phone]    05/16/23  7:36 AM     ?    ?  ?

## 2023-05-16 NOTE — PROGRESS NOTES
Awake & alert. Tolerating po fluids. States pain has eased to level 6/10. Meets criteria for phase 2.

## 2023-05-16 NOTE — PROGRESS NOTES
Adm to pacu bay 10 from or per cart awakening. Respirations easy & symmetrical. Dressing to rt hip dry & intact. Rt pedal & posterior tibial pulses strongly palpable. Good movement & sensation in toes. C/O rt hip pain & rt ankle pain. Ice applied to both sites. Rt ankle massaged. VSS. Report received from or staff.

## 2023-05-18 ENCOUNTER — OFFICE VISIT (OUTPATIENT)
Dept: ORTHOPEDIC SURGERY | Age: 63
End: 2023-05-18

## 2023-05-18 VITALS — BODY MASS INDEX: 26.88 KG/M2 | HEIGHT: 71 IN | WEIGHT: 192 LBS

## 2023-05-18 DIAGNOSIS — Z98.890 STATUS POST ARTHROSCOPY OF HIP: Primary | ICD-10-CM

## 2023-05-18 PROCEDURE — 99024 POSTOP FOLLOW-UP VISIT: CPT | Performed by: ORTHOPAEDIC SURGERY

## 2023-05-18 NOTE — PROGRESS NOTES
History of Present Illness:  Licha Mai is a pleasant 57 y.o. male who presents for a post operative visit. He is 2 days out following a right hip labral reconstruction, LINH decompression and capsular plication. Overall He is doing okay and feels that their pain is well controlled with current pain medications. He has been compliant with the 40lb flat foot weight bearing instructions. He plans to do physical therapy at the [facility] location. He denies fevers, chills, numbness, tingling, and shortness of breath. Medical History:  Patient's medications, allergies, past medical, surgical, social and family histories were reviewed and updated as appropriate. No notes on file    Review of Systems  A 14 point review of systems was completed by the patient and is available in the media section of the scanned medical record and was reviewed on 5/18/2023. Vital Signs: There were no vitals filed for this visit. General/Appearance: Alert and oriented and in no apparent distress. Skin:  There are no skin lesions, cellulitis, or extreme edema. The patient has warm and well-perfused Bilateral lower  extremities with brisk capillary refill.      right Hip  Exam:     Inspection: Hip incision(s)  are clean, dry and intact and well approximated. The Therabond dressing is still in place. Mild ecchymosis and swelling are present as can be expected. There is no erythema, drainage or other signs of infection    Palpation:  No crepitus to gentle motion / circumduction of the hip    Active Range of Motion: Deferred    Passive Range of Motion: 0-80deg, limber    Strength:  Deferred +normal anti gravity knee extension    Special Tests:  Deferred.     Neurovascular: Sensation to light touch is intact, no motor deficits, palpable radial pulses 2+    Radiology:     Plain radiographs of the right hip comprising 3 views were obtained and reviewed in the office: Shows postsurgical changes from the hip arthroscopy and

## 2023-05-23 ENCOUNTER — HOSPITAL ENCOUNTER (OUTPATIENT)
Dept: PHYSICAL THERAPY | Age: 63
Setting detail: THERAPIES SERIES
Discharge: HOME | End: 2023-05-23
Payer: COMMERCIAL

## 2023-05-23 PROCEDURE — 97116 GAIT TRAINING THERAPY: CPT

## 2023-05-23 PROCEDURE — 97161 PT EVAL LOW COMPLEX 20 MIN: CPT

## 2023-05-23 PROCEDURE — 97530 THERAPEUTIC ACTIVITIES: CPT

## 2023-05-23 NOTE — PLAN OF CARE
[place], [facility], [address]  Phone: [phone]   Fax: [phone]                                                       Physical Therapy Certification    Dear Tiffany Negron MD  ,    We had the pleasure of evaluating the following patient for physical therapy services at [address] [place] [address]. A summary of our findings can be found in the initial assessment below. This includes our plan of care. If you have any questions or concerns regarding these findings, please do not hesitate to contact me at the office phone number checked above. Thank you for the referral.       Physician Signature:_______________________________Date:__________________  By signing above (or electronic signature), therapists plan is approved by physician      Patient: Juan Pablo Hagan   : 1960   MRN: [id number]  Referring Physician: Tiffany Negron MD        Evaluation Date: 2023      Medical Diagnosis Information:  Tear of right acetabular labrum, subsequent encounter [S73.191D]  Femoroacetabular impingement of right hip [M25.851]   PT diagnosis: Decreased hip strength, decreased hip ROM, abnormal gait, balance deficit. Insurance information: PT Insurance Information: sabino, no auth, ded met, no copay     Precautions/ Contra-indications: RIGHT HIP DIAGNOSTIC ARTHROSCOPY, LABRAL REPAIR, FEMOROACETABULAR IMPINGEMENT DECOMPRESSION  Latex Allergy:  [x]NO      []YES  Preferred Language for Healthcare:   [x]English       []Other:    C-SSRS Triggered by Intake questionnaire (Past 2 wk assessment ):   [x] No, Questionnaire did not trigger screening.   [] Yes, Patient intake triggered C-SSRS Screening     [] Completed, no further action required.    [] Completed, PCP notified via Epic    SUBJECTIVE: Pt reports doing well since

## 2023-05-23 NOTE — FLOWSHEET NOTE
achieved in: 6 weeks  1. Independent in HEP and progression per patient tolerance, in order to prevent re-injury. [] Progressing: [] Met: [] Not Met: [] Adjusted  2. Patient will have a decrease in pain to a maximal score of 4/10 in order to increase tolerance to standing activities. [] Progressing: [] Met: [] Not Met: [] Adjusted  3. Patient will ambulate 500' with no AD and no increase in symptoms to show ability to safely ambulate throughout community. [] Progressing: [] Met: [] Not Met: [] Adjusted    Long Term Goals: To be achieved in:12 weeks  1. Pt will improve LEFS to 30% disability or less to progress towards age appropriate recreational activity. [] Progressing: [] Met: [] Not Met: [] Adjusted  2. Patient will demonstrate increased R hip flexion AROM to 120 to allow for increased ability to negotiate uneven surfaces while hiking. [] Progressing: [] Met: [] Not Met: [] Adjusted  3. Patient will demonstrate an increase in R hip abduction strength to 5/5 in order to improve tolerance to prolonged walking. [] Progressing: [] Met: [] Not Met: [] Adjusted  4. Patient will ambulate up/down 12 stairs reciprocally with no AD in order to safely negotiate stairs in home. [] Progressing: [] Met: [] Not Met: [] Adjusted  5. Pt will demo 14 reps or more on 30 sec STS for improved indep transfers. [] Progressing: [] Met: [] Not Met: [] Adjusted        Overall Progression Towards Functional goals/ Treatment Progress Update:  [] Patient is progressing as expected towards functional goals listed. [] Progression is slowed due to complexities/Impairments listed. [] Progression has been slowed due to co-morbidities.   [x] Plan just implemented, too soon to assess goals progression <30days   [] Goals require adjustment due to lack of progress  [] Patient is not progressing as expected and requires additional follow up with physician  [] Other    Persisting Functional

## 2023-05-31 ENCOUNTER — TELEPHONE (OUTPATIENT)
Dept: ORTHOPEDIC SURGERY | Age: 63
End: 2023-05-31

## 2023-05-31 ENCOUNTER — HOSPITAL ENCOUNTER (OUTPATIENT)
Dept: PHYSICAL THERAPY | Age: 63
Setting detail: THERAPIES SERIES
Discharge: HOME | End: 2023-05-31
Payer: COMMERCIAL

## 2023-05-31 PROCEDURE — 97116 GAIT TRAINING THERAPY: CPT

## 2023-05-31 PROCEDURE — 97110 THERAPEUTIC EXERCISES: CPT

## 2023-05-31 NOTE — TELEPHONE ENCOUNTER
General Question     Subject: PATIENT IS WONDERING IF HE CAN STOP BY THE OFFICE AFTER PT AND GET HIS STITCHES OUT.   Patient: Sherice Baltazar \"Romario\"  Contact Number: [phone]

## 2023-05-31 NOTE — FLOWSHEET NOTE
[facility] Physical Therapy  Phone: [phone]   Fax: [phone]    Physical Therapy Daily Treatment Note    Date:  2023     Patient Name:  Oneil Larson    :  1960  MRN: [id number]  Medical Diagnosis:  Tear of right acetabular labrum, subsequent encounter [S73.889D]  Femoroacetabular impingement of right hip [M25.851]  Treatment Diagnosis: Decreased hip strength, decreased hip ROM, abnormal gait, balance deficit. Insurance/Certification information:  PT Insurance Information: anthem, no auth, ded met, no copay  Physician Information:  Mateo Bazan MD    Plan of care signed (Y/N): []  Yes [x]  No     Date of Patient follow up with Physician:      Progress Report: []  Yes  [x]  No     Date Range for reporting period:  Beginnin2023  Ending:     Progress report due (10 Rx/or 30 days whichever is less): visit #10 or       Recertification due (POC duration/ or 90 days whichever is less): visit #16 or 23      Visit # Insurance Allowable Auth required? Date Range    60 []  Yes  [x]  No      Latex Allergy:  [x]NO      []YES  Preferred Language for Healthcare:   [x]English       []other:    Functional Scale:       Date assessed:  LEFS: raw score = 24; dysfunction = 70%  23    Pain level:  2/10     SUBJECTIVE:  Pt reports good response to initial visit and notes good compliance with HEP. Chief c/o currently is moderate R hip stiffness. OBJECTIVE: See eval      RESTRICTIONS/PRECAUTIONS:  RIGHT HIP DIAGNOSTIC ARTHROSCOPY, LABRAL REPAIR, FEMOROACETABULAR IMPINGEMENT DECOMPRESSION. Currently TTWB until week 2. Begin WBAT with crutches in week 2. See rest of protocol in teams.     Exercises/Interventions:     Therapeutic Exercises (92730) Resistance / level Sets/sec Reps Notes   Upright bike  5'     IB  30\" 3    Prone lye  3'     Prone glute set  10\" 15    Prone R hamstring curl  2 10

## 2023-05-31 NOTE — TELEPHONE ENCOUNTER
Pts wife (retired RN) was to take out stitches for pt. She didn't like the way they looked so stopped. His f/u appt isn't until 6/5/23. We will see him in office tmr afternoon to take a look.

## 2023-06-01 ENCOUNTER — OFFICE VISIT (OUTPATIENT)
Dept: ORTHOPEDIC SURGERY | Age: 63
End: 2023-06-01

## 2023-06-01 VITALS — WEIGHT: 192 LBS | BODY MASS INDEX: 26.88 KG/M2 | HEIGHT: 71 IN

## 2023-06-01 DIAGNOSIS — M25.851 FEMOROACETABULAR IMPINGEMENT OF RIGHT HIP: ICD-10-CM

## 2023-06-01 DIAGNOSIS — Z98.890 STATUS POST ARTHROSCOPY OF HIP: Primary | ICD-10-CM

## 2023-06-01 DIAGNOSIS — S73.191D TEAR OF RIGHT ACETABULAR LABRUM, SUBSEQUENT ENCOUNTER: ICD-10-CM

## 2023-06-01 PROCEDURE — 99024 POSTOP FOLLOW-UP VISIT: CPT

## 2023-06-01 NOTE — PROGRESS NOTES
History of Present Illness:  Fatuma See is a pleasant 57 y.o. male who presents for a post operative visit. He is 2 weeks out following a  a right hip labral reconstruction, LINH decompression and capsular plication (DOS 8/96/64). . Overall He is doing okay and feels that their pain is well controlled with current pain medications. He has been compliant with the 40lb flat foot weight bearing instructions. He has been in physical therapy at our [place] location. He denies any setbacks. He denies fevers, chills, numbness, tingling, and shortness of breath. Medical History:  Patient's medications, allergies, past medical, surgical, social and family histories were reviewed and updated as appropriate. No notes on file    Review of Systems  A 14 point review of systems was completed by the patient and is available in the media section of the scanned medical record and was reviewed on 6/1/2023. Vital Signs: There were no vitals filed for this visit. General/Appearance: Alert and oriented and in no apparent distress. Skin:  There are no skin lesions, cellulitis, or extreme edema. The patient has warm and well-perfused Bilateral lower  extremities with brisk capillary refill. Hip  Exam:    Inspection: The nylon closure was remoed and the Hip incision(s)  are clean, dry and intact and well approximated. Mild ecchymosis and swelling are present as can be expected.  There is no erythema, drainage or other signs of infection    Palpation:  No crepitus to gentle motion / circumduction of the hip    Active Range of Motion: Deferred    Passive Range of Motion: 0-90    Strength:  Deferred    Special Tests: Quadruped rocks good     Pelvic tilts fair    Neurovascular: Sensation to light touch is intact, no motor deficits, palpable radial pulses 2+    Radiology:       No new XR obtained at this time         Assessment :  Mr. Fatuma See is a pleasant 57 y.o. patient who is 2 weeks out folllowing

## 2023-06-05 ENCOUNTER — HOSPITAL ENCOUNTER (OUTPATIENT)
Dept: PHYSICAL THERAPY | Age: 63
Setting detail: THERAPIES SERIES
Discharge: HOME | End: 2023-06-05
Payer: COMMERCIAL

## 2023-06-05 PROCEDURE — 97110 THERAPEUTIC EXERCISES: CPT

## 2023-06-05 PROCEDURE — 97116 GAIT TRAINING THERAPY: CPT

## 2023-06-05 NOTE — FLOWSHEET NOTE
proprioception to assist with   [] LE / lumbar: LE, proximal hip, and core control in self care, mobility, lifting, ambulation and eccentric single leg control. [] UE / cervical: cervical, scapular, scapulothoracic and UE control with self care, reaching, carrying, lifting, house/yardwork, driving, computer work.   [] (28848) Therapist is in constant attendance of 2 or more patients providing skilled therapy interventions, but not providing any significant amount of measurable one-on-one time to either patient, for improvements in  [] LE / lumbar: LE, proximal hip, and core control in self care, mobility, lifting, ambulation and eccentric single leg control. [] UE / cervical: cervical, scapular, scapulothoracic and UE control with self care, reaching, carrying, lifting, house/yardwork, driving, computer work. NMR and Therapeutic Activities:    [x] (33723 or 70948) Provided verbal/tactile cueing for activities related to improving balance, coordination, kinesthetic sense, posture, motor skill, proprioception and motor activation to allow for proper function of   [x] LE: / Lumbar core, proximal hip and LE with self care and ADLs  [] UE / Cervical: cervical, postural, scapular, scapulothoracic and UE control with self care, carrying, lifting, driving, computer work.    [x] (13442) Gait Re-education- Provided training and instruction to the patient for proper LE, core and proximal hip recruitment and positioning and eccentric body weight control with ambulation re-education including up and down stairs     Home Management Training / Self Care:  [] (40752) Provided self-care/home management training related to activities of daily living and compensatory training, and/or use of adaptive equipment for improvement with: ADLs and compensatory training, meal preparation, safety procedures and instruction in use of adaptive equipment, including bathing, grooming, dressing, personal hygiene, basic household cleaning and

## 2023-06-08 ENCOUNTER — HOSPITAL ENCOUNTER (OUTPATIENT)
Dept: PHYSICAL THERAPY | Age: 63
Setting detail: THERAPIES SERIES
Discharge: HOME | End: 2023-06-08
Payer: COMMERCIAL

## 2023-06-08 PROCEDURE — 97116 GAIT TRAINING THERAPY: CPT

## 2023-06-08 PROCEDURE — 97110 THERAPEUTIC EXERCISES: CPT

## 2023-06-08 NOTE — FLOWSHEET NOTE
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 Insurance/Certification information:  PT Insurance Information: anthem, no auth, ded met, no copay  Physician Information:  Lindy Zurita MD    Plan of care signed (Y/N): []  Yes [x]  No     Date of Patient follow up with Physician:      Progress Report: []  Yes  [x]  No     Date Range for reporting period:  Beginnin2023  Ending:     Progress report due (10 Rx/or 30 days whichever is less): visit #10 or       Recertification due (POC duration/ or 90 days whichever is less): visit #16 or 23      Visit # Insurance Allowable Auth required? Date Range    60 []  Yes  [x]  No      Latex Allergy:  [x]NO      []YES  Preferred Language for Healthcare:   [x]English       []other:    Functional Scale:       Date assessed:  LEFS: raw score = 24; dysfunction = 70%  23    Pain level:  2/10     SUBJECTIVE:  Pt states R hip cont's to steadily improve as far as function, activity tolerance and drops in discomfort/pain. OBJECTIVE:   : Pt ambulating into and around clinic without AD exhibiting decreased R stride length, stance time and hip extension through MS-HO phase. RESTRICTIONS/PRECAUTIONS:  RIGHT HIP DIAGNOSTIC ARTHROSCOPY, LABRAL REPAIR, FEMOROACETABULAR IMPINGEMENT DECOMPRESSION. Currently TTWB until week 2. Begin WBAT with crutches in week 2. See rest of protocol in teams.     Exercises/Interventions:     Therapeutic Exercises (03293) Resistance / level Sets/sec Reps Notes   Upright bike  5'

## 2023-06-19 ENCOUNTER — HOSPITAL ENCOUNTER (OUTPATIENT)
Dept: PHYSICAL THERAPY | Age: 63
Setting detail: THERAPIES SERIES
Discharge: HOME | End: 2023-06-19
Payer: COMMERCIAL

## 2023-06-19 PROCEDURE — 97110 THERAPEUTIC EXERCISES: CPT

## 2023-06-19 PROCEDURE — 97112 NEUROMUSCULAR REEDUCATION: CPT

## 2023-06-19 NOTE — FLOWSHEET NOTE
bathing, grooming, dressing, personal hygiene, basic household cleaning and chores. Home Exercise Program:    [] (58987) Reviewed/Progressed HEP activities related to strengthening, flexibility, endurance, ROM of   [] LE / Lumbar: core, proximal hip and LE for functional self-care, mobility, lifting and ambulation/stair navigation   [] UE / Cervical: cervical, postural, scapular, scapulothoracic and UE control with self care, reaching, carrying, lifting, house/yardwork, driving, computer work  [] (64696)Reviewed/Progressed HEP activities related to improving balance, coordination, kinesthetic sense, posture, motor skill, proprioception of   [] LE: core, proximal hip and LE for self care, mobility, lifting, and ambulation/stair navigation    [] UE / Cervical: cervical, postural,  scapular, scapulothoracic and UE control with self care, reaching, carrying, lifting, house/yardwork, driving, computer work    Manual Treatments:  PROM / STM / Oscillations-Mobs:  G-I, II, III, IV (PA's, Inf., Post.)  [] (29083) Provided manual therapy to mobilize LE, proximal hip and/or LS spine soft tissue/joints for the purpose of modulating pain, promoting relaxation,  increasing ROM, reducing/eliminating soft tissue swelling/inflammation/restriction, improving soft tissue extensibility and allowing for proper ROM for normal function with   [] LE / lumbar: self care, mobility, lifting and ambulation. [] UE / Cervical: self care, reaching, carrying, lifting, house/yardwork, driving, computer work. Modalities:  [] (00845) Vasopneumatic compression: Utilized vasopneumatic compression to decrease edema / swelling for the purpose of improving mobility and quad tone / recruitment which will allow for increased overall function including but not limited to self-care, transfers, ambulation, and ascending / descending stairs.        Charges:  Timed Code Treatment Minutes: 44   Total Treatment Minutes: 44     [] EVAL - LOW (34523)   []

## 2023-06-21 ENCOUNTER — OFFICE VISIT (OUTPATIENT)
Dept: INTERNAL MEDICINE CLINIC | Age: 63
End: 2023-06-21
Payer: COMMERCIAL

## 2023-06-21 ENCOUNTER — TELEPHONE (OUTPATIENT)
Dept: INTERNAL MEDICINE CLINIC | Age: 63
End: 2023-06-21

## 2023-06-21 VITALS
DIASTOLIC BLOOD PRESSURE: 68 MMHG | HEIGHT: 71 IN | SYSTOLIC BLOOD PRESSURE: 118 MMHG | RESPIRATION RATE: 14 BRPM | OXYGEN SATURATION: 98 % | BODY MASS INDEX: 27.16 KG/M2 | HEART RATE: 63 BPM | WEIGHT: 194 LBS

## 2023-06-21 DIAGNOSIS — G89.29 CHRONIC PAIN OF BOTH KNEES: ICD-10-CM

## 2023-06-21 DIAGNOSIS — M25.512 CHRONIC PAIN OF BOTH SHOULDERS: ICD-10-CM

## 2023-06-21 DIAGNOSIS — M25.511 CHRONIC PAIN OF BOTH SHOULDERS: ICD-10-CM

## 2023-06-21 DIAGNOSIS — M25.561 CHRONIC PAIN OF BOTH KNEES: ICD-10-CM

## 2023-06-21 DIAGNOSIS — M17.0 PRIMARY OSTEOARTHRITIS OF BOTH KNEES: Primary | ICD-10-CM

## 2023-06-21 DIAGNOSIS — G89.29 CHRONIC PAIN OF BOTH SHOULDERS: ICD-10-CM

## 2023-06-21 DIAGNOSIS — M25.562 CHRONIC PAIN OF BOTH KNEES: ICD-10-CM

## 2023-06-21 PROCEDURE — 99214 OFFICE O/P EST MOD 30 MIN: CPT | Performed by: INTERNAL MEDICINE

## 2023-06-21 PROCEDURE — 3074F SYST BP LT 130 MM HG: CPT | Performed by: INTERNAL MEDICINE

## 2023-06-21 PROCEDURE — 3078F DIAST BP <80 MM HG: CPT | Performed by: INTERNAL MEDICINE

## 2023-06-21 RX ORDER — MELOXICAM 15 MG/1
15 TABLET ORAL
Qty: 90 TABLET | Refills: 1 | Status: SHIPPED | OUTPATIENT
Start: 2023-06-21

## 2023-06-21 NOTE — PROGRESS NOTES
of concern  MS: Bilateral knees with crepitus with range of motion, R>L. Bony hypertrophy more notable on the right knee. No knee effusions noted. Shoulders with decent range of motion without pain. Negative impingement signs. Negative speeds sign. No crepitus with range of motion of the shoulders. ASSESSMENT/PLAN:    1. Primary osteoarthritis of both knees  Patient interested in a rheumatology consultation  I suspect he will need to see orthopedics at least for the right knee  May need MRI for further evaluation  We will try meloxicam instead of naproxen to see if he has any better luck with this NSAID  He did wonderful with prednisone  Rule out inflammatory arthritis  We discussed the potential adverse side effects of NSAIDs including but not limited to GI bleeding, ulcers, liver disease, increased risk of cardiovascular disease and heart attack  - AFL - Maria G Stapleton MD, Rheumatology, [facility]  - meloxicam (MOBIC) 15 MG tablet; Take 1 tablet by mouth daily (with breakfast)  Dispense: 90 tablet; Refill: 1    2. Chronic pain of both shoulders  Patient may have rotator cuff arthropathy  Rule out arthritis  Patient is interested in a rheumatology consultation  May need to go back and see Ortho  We will get some plain x-rays  Trial of meloxicam  Home exercises  - Scott Pierce MD, Rheumatology, [facility]  - meloxicam (MOBIC) 15 MG tablet; Take 1 tablet by mouth daily (with breakfast)  Dispense: 90 tablet; Refill: 1  - XR SHOULDER LEFT (MIN 2 VIEWS); Future  - XR SHOULDER RIGHT (MIN 2 VIEWS); Future              Discussed medications with patient who voiced understanding of their use, indication and potential side effects. Pt also understands the above recommendations. All questions answered. This note was generated completely or in part utilizing Dragon dictation speech recognition software.   Occasionally, words are mistranscribed and despite editing, the text may contain

## 2023-06-21 NOTE — TELEPHONE ENCOUNTER
Patient was given a referral  for a Rheumatologist when he was here today but that doctor is booked up for 4 months. He found a rheumatologist with [facility] who can see him sooner. [facility] Please call patient at number provided to let him know if this is  possible.

## 2023-06-22 ENCOUNTER — HOSPITAL ENCOUNTER (OUTPATIENT)
Age: 63
Discharge: HOME | End: 2023-06-22
Payer: COMMERCIAL

## 2023-06-22 ENCOUNTER — HOSPITAL ENCOUNTER (OUTPATIENT)
Dept: GENERAL RADIOLOGY | Age: 63
Discharge: HOME | End: 2023-06-22
Payer: COMMERCIAL

## 2023-06-22 DIAGNOSIS — M25.512 CHRONIC PAIN OF BOTH SHOULDERS: ICD-10-CM

## 2023-06-22 DIAGNOSIS — M25.511 CHRONIC PAIN OF BOTH SHOULDERS: ICD-10-CM

## 2023-06-22 DIAGNOSIS — G89.29 CHRONIC PAIN OF BOTH SHOULDERS: ICD-10-CM

## 2023-06-22 PROCEDURE — 73030 X-RAY EXAM OF SHOULDER: CPT

## 2023-06-23 ENCOUNTER — HOSPITAL ENCOUNTER (OUTPATIENT)
Dept: PHYSICAL THERAPY | Age: 63
Setting detail: THERAPIES SERIES
Discharge: HOME | End: 2023-06-23
Payer: COMMERCIAL

## 2023-06-23 PROCEDURE — 97112 NEUROMUSCULAR REEDUCATION: CPT

## 2023-06-23 PROCEDURE — 97530 THERAPEUTIC ACTIVITIES: CPT

## 2023-06-23 PROCEDURE — 97110 THERAPEUTIC EXERCISES: CPT

## 2023-06-23 NOTE — FLOWSHEET NOTE
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 Insurance/Certification information:  PT Insurance Information: anthem, no auth, ded met, no copay  Physician Information:  Jen Ames MD    Plan of care signed (Y/N): []  Yes [x]  No     Date of Patient follow up with Physician:      Progress Report: []  Yes  [x]  No     Date Range for reporting period:  Beginnin2023  Ending:     Progress report due (10 Rx/or 30 days whichever is less): visit #10 or       Recertification due (POC duration/ or 90 days whichever is less): visit #16 or 23      Visit # Insurance Allowable Auth required? Date Range    60 []  Yes  [x]  No      Latex Allergy:  [x]NO      []YES  Preferred Language for Healthcare:   [x]English       []other:    Functional Scale:       Date assessed:  LEFS: raw score = 24; dysfunction = 70%  23    Pain level:  3/10     SUBJECTIVE:  Pt states R hip stiffness and discomfort is steadily dropping which is aiding in activity tolerance and house chores. OBJECTIVE:   : AROM R hip flex 103, abd 25, ER 35  : Pt ambulating into and around clinic without AD exhibiting decreased R stride length, stance time and hip extension through MS-HO phase. RESTRICTIONS/PRECAUTIONS:  RIGHT HIP DIAGNOSTIC ARTHROSCOPY, LABRAL REPAIR, FEMOROACETABULAR IMPINGEMENT DECOMPRESSION. Currently TTWB until week 2. Begin WBAT with crutches in week 2. See rest of protocol in teams.     Exercises/Interventions:     Therapeutic Exercises (21011) Resistance /

## 2023-06-27 ENCOUNTER — HOSPITAL ENCOUNTER (OUTPATIENT)
Dept: PHYSICAL THERAPY | Age: 63
Setting detail: THERAPIES SERIES
Discharge: HOME | End: 2023-06-27
Payer: COMMERCIAL

## 2023-06-27 PROCEDURE — 97530 THERAPEUTIC ACTIVITIES: CPT

## 2023-06-27 PROCEDURE — 97110 THERAPEUTIC EXERCISES: CPT

## 2023-06-30 ENCOUNTER — HOSPITAL ENCOUNTER (OUTPATIENT)
Dept: PHYSICAL THERAPY | Age: 63
Setting detail: THERAPIES SERIES
Discharge: HOME | End: 2023-06-30
Payer: COMMERCIAL

## 2023-06-30 PROCEDURE — 97110 THERAPEUTIC EXERCISES: CPT

## 2023-06-30 PROCEDURE — 97112 NEUROMUSCULAR REEDUCATION: CPT

## 2023-06-30 PROCEDURE — 97530 THERAPEUTIC ACTIVITIES: CPT

## 2023-07-03 ENCOUNTER — HOSPITAL ENCOUNTER (OUTPATIENT)
Dept: PHYSICAL THERAPY | Age: 63
Setting detail: THERAPIES SERIES
Discharge: HOME | End: 2023-07-03
Payer: COMMERCIAL

## 2023-07-03 ENCOUNTER — OFFICE VISIT (OUTPATIENT)
Dept: ORTHOPEDIC SURGERY | Age: 63
End: 2023-07-03

## 2023-07-03 VITALS — BODY MASS INDEX: 27.16 KG/M2 | WEIGHT: 194 LBS | HEIGHT: 71 IN

## 2023-07-03 DIAGNOSIS — Z98.890 STATUS POST ARTHROSCOPY OF HIP: Primary | ICD-10-CM

## 2023-07-03 PROCEDURE — 97110 THERAPEUTIC EXERCISES: CPT

## 2023-07-03 PROCEDURE — 97112 NEUROMUSCULAR REEDUCATION: CPT

## 2023-07-03 PROCEDURE — 97530 THERAPEUTIC ACTIVITIES: CPT

## 2023-07-03 PROCEDURE — 99024 POSTOP FOLLOW-UP VISIT: CPT | Performed by: ORTHOPAEDIC SURGERY

## 2023-07-03 NOTE — PROGRESS NOTES
History of Present Illness:  Yashira Santana is a pleasant 57 y.o. male who presents for a post operative visit. He is 6 weeks out following a right hip labral reconstruction, LINH decompression and capsular plication. Overall He is doing okay but has intermittent deep buttock pain, occasional anterior pain. He has been in physical therapy at the [facility] location. He felt that he may have been over active with his hip recently and has been climbing up ladders to his roof this past weekend given the heavy rain. No specific setback. He denies fevers, chills, numbness, tingling, and shortness of breath. Medical History:  Patient's medications, allergies, past medical, surgical, social and family histories were reviewed and updated as appropriate. History of Present Illness:  Yashira Santana is a pleasant 57 y.o. male who presents for a post operative visit. He is 2 days out following a right hip labral reconstruction, LINH decompression and capsular plication. Overall He is doing okay and feels that their pain is well controlled with current pain medications. He has been compliant with the 40lb flat foot weight bearing instructions. He plans to do physical therapy at the [facility] location. He denies fevers, chills, numbness, tingling, and shortness of breath. Medical History:  Patient's medications, allergies, past medical, surgical, social and family histories were reviewed and updated as appropriate. No notes on file    Review of Systems  A 14 point review of systems was completed by the patient and is available in the media section of the scanned medical record and was reviewed on 7/3/2023. Vital Signs: There were no vitals filed for this visit. General/Appearance: Alert and oriented and in no apparent distress. Skin:  There are no skin lesions, cellulitis, or extreme edema.  The patient has warm and well-perfused Bilateral lower  extremities with brisk capillary refill.      right

## 2023-07-03 NOTE — FLOWSHEET NOTE
ability to negotiate uneven surfaces while hiking. [x] Progressing: [] Met: [] Not Met: [] Adjusted  3. Patient will demonstrate an increase in R hip abduction strength to 5/5 in order to improve tolerance to prolonged walking. [x] Progressing: [] Met: [] Not Met: [] Adjusted  4. Patient will ambulate up/down 12 stairs reciprocally with no AD in order to safely negotiate stairs in home. [x] Progressing: [] Met: [] Not Met: [] Adjusted  5. Pt will demo 14 reps or more on 30 sec STS for improved indep transfers. [x] Progressing: [] Met: [] Not Met: [] Adjusted        Overall Progression Towards Functional goals/ Treatment Progress Update:  [x] Patient is progressing as expected towards functional goals listed. [] Progression is slowed due to complexities/Impairments listed. [] Progression has been slowed due to co-morbidities. [] Plan just implemented, too soon to assess goals progression <30days   [] Goals require adjustment due to lack of progress  [] Patient is not progressing as expected and requires additional follow up with physician  [] Other    Persisting Functional Limitations/Impairments:  []Sleeping [x]Sitting               []Standing [x]Transfers        []Walking [x]Kneeling               [x]Stairs [x]Squatting / bending   [x]ADLs [x]Reaching  [x]Lifting  [x]Housework  [x]Driving []Job related tasks  [x]Sports/Recreation []Other:        ASSESSMENT:  Pt able to tolerate exercise progressions well. Pt with mild 2/10 pain with all exercises this session, pt reported it didn't get worse or better throughout treatment. Pt adequately challenged from wall sits this session.      Treatment/Activity Tolerance:  [x] Patient able to complete tx [] Patient limited by fatigue  [] Patient limited by pain  [] Patient limited by other medical complications  [] Other:     Prognosis: [x] Good [] Fair  [] Poor    Patient Requires Follow-up: [x] Yes  [] No    Plan for next treatment session: Continue hip/TA

## 2023-07-05 ENCOUNTER — HOSPITAL ENCOUNTER (OUTPATIENT)
Dept: PHYSICAL THERAPY | Age: 63
Setting detail: THERAPIES SERIES
Discharge: HOME | End: 2023-07-05
Payer: COMMERCIAL

## 2023-07-05 PROCEDURE — 97530 THERAPEUTIC ACTIVITIES: CPT

## 2023-07-05 PROCEDURE — 97110 THERAPEUTIC EXERCISES: CPT

## 2023-07-05 PROCEDURE — 97112 NEUROMUSCULAR REEDUCATION: CPT

## 2023-07-05 NOTE — FLOWSHEET NOTE
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 Insurance/Certification information:  PT Insurance Information: anthem, no auth, ded met, no copay  Physician Information:  Oneil Newman MD    Plan of care signed (Y/N): [x]  Yes []  No     Date of Patient follow up with Physician:      Progress Report: []  Yes  [x]  No     Date Range for reporting period:  Beginnin2023  PN: 2023  Ending:     Progress report due (10 Rx/or 30 days whichever is less): visit #95 or 3/44    Recertification due (POC duration/ or 90 days whichever is less): visit #16 or 23      Visit # Insurance Allowable Auth required? Date Range    60 []  Yes  [x]  No      Latex Allergy:  [x]NO      []YES  Preferred Language for Healthcare:   [x]English       []other:    Functional Scale:       Date assessed:  LEFS: raw score = 24; dysfunction = 70%  23  LEFS: raw score = 39; dysfunction = 51.25% 23    Pain level:  2/10     SUBJECTIVE:  Pt reports mild R hip soreness and tightness. Pt has been experiencing abdominal cramping over the past few days and hasn't been feeling generally well. OBJECTIVE:   : 30s STS: 8    PROM AROM     L R L R   Knee Flexion           Knee Extension           Hip IR    27   Hip ER     40 32   Hip flexion    116   105, p! Hip ABD       30, p!      : AROM R hip flex 103, abd 25, ER 35  : Pt ambulating into and around clinic without AD exhibiting decreased R stride length, stance time and hip extension through MS-HO phase.

## 2023-07-12 ENCOUNTER — HOSPITAL ENCOUNTER (OUTPATIENT)
Dept: PHYSICAL THERAPY | Age: 63
Setting detail: THERAPIES SERIES
Discharge: HOME | End: 2023-07-12
Payer: COMMERCIAL

## 2023-07-12 PROCEDURE — 97112 NEUROMUSCULAR REEDUCATION: CPT

## 2023-07-12 PROCEDURE — 97530 THERAPEUTIC ACTIVITIES: CPT

## 2023-07-12 PROCEDURE — 97110 THERAPEUTIC EXERCISES: CPT

## 2023-07-14 ENCOUNTER — TELEPHONE (OUTPATIENT)
Dept: INTERNAL MEDICINE CLINIC | Age: 63
End: 2023-07-14

## 2023-07-14 ENCOUNTER — HOSPITAL ENCOUNTER (OUTPATIENT)
Dept: PHYSICAL THERAPY | Age: 63
Setting detail: THERAPIES SERIES
Discharge: HOME | End: 2023-07-14
Payer: COMMERCIAL

## 2023-07-14 PROCEDURE — 97112 NEUROMUSCULAR REEDUCATION: CPT

## 2023-07-14 PROCEDURE — 97110 THERAPEUTIC EXERCISES: CPT

## 2023-07-14 PROCEDURE — 97530 THERAPEUTIC ACTIVITIES: CPT

## 2023-07-14 NOTE — FLOWSHEET NOTE
demonstrate increased R hip flexion AROM to 120 to allow for increased ability to negotiate uneven surfaces while hiking. [x] Progressing: [] Met: [] Not Met: [] Adjusted  3. Patient will demonstrate an increase in R hip abduction strength to 5/5 in order to improve tolerance to prolonged walking. [x] Progressing: [] Met: [] Not Met: [] Adjusted  4. Patient will ambulate up/down 12 stairs reciprocally with no AD in order to safely negotiate stairs in home. [x] Progressing: [] Met: [] Not Met: [] Adjusted  5. Pt will demo 14 reps or more on 30 sec STS for improved indep transfers. [x] Progressing: [] Met: [] Not Met: [] Adjusted        Overall Progression Towards Functional goals/ Treatment Progress Update:  [x] Patient is progressing as expected towards functional goals listed. [] Progression is slowed due to complexities/Impairments listed. [] Progression has been slowed due to co-morbidities. [] Plan just implemented, too soon to assess goals progression <30days   [] Goals require adjustment due to lack of progress  [] Patient is not progressing as expected and requires additional follow up with physician  [] Other    Persisting Functional Limitations/Impairments:  []Sleeping [x]Sitting               []Standing [x]Transfers        []Walking [x]Kneeling               [x]Stairs [x]Squatting / bending   [x]ADLs [x]Reaching  [x]Lifting  [x]Housework  [x]Driving []Job related tasks  [x]Sports/Recreation []Other:        ASSESSMENT:  Cont'd to challenge pt with [facility] strengthening and balance program. Pt is demonstrating gradual improvements in R LE control and less deviations with functional tasks as LE strength improves and hip discomfort lessens. Additionally, pt is tolerating ADL's with less limitations and hip discomfort. Due to residual R LE weakness and substitutions through [facility] activities created by weakness, pt will benefit with continuing skilled PT to address noted deficits.       Treatment/Activity

## 2023-07-14 NOTE — TELEPHONE ENCOUNTER
----- Message from [address] sent at 7/14/2023 11:40 AM EDT -----  Subject: Message to Provider    QUESTIONS  Information for Provider? Pt called stating [facility]. Syed Mer[facility]   states they are not able to see referral in EP[facility] and requesting it be   reuploaded in system so they can schedule. [facility] does see referral is   visible. Please contact specialist office f/u. Call back [phone]. Also f/u with pt when resolved. can leave VM.  ---------------------------------------------------------------------------  --------------  Jerri Marker INFO  [phone]; OK to leave message on voicemail  ---------------------------------------------------------------------------  --------------  SCRIPT ANSWERS  Relationship to Patient?  Self

## 2023-07-17 ENCOUNTER — HOSPITAL ENCOUNTER (OUTPATIENT)
Dept: PHYSICAL THERAPY | Age: 63
Setting detail: THERAPIES SERIES
End: 2023-07-17
Payer: COMMERCIAL

## 2023-07-20 ENCOUNTER — HOSPITAL ENCOUNTER (OUTPATIENT)
Dept: PHYSICAL THERAPY | Age: 63
Setting detail: THERAPIES SERIES
Discharge: HOME | End: 2023-07-20
Payer: COMMERCIAL

## 2023-07-20 PROCEDURE — 97112 NEUROMUSCULAR REEDUCATION: CPT

## 2023-07-20 PROCEDURE — 97530 THERAPEUTIC ACTIVITIES: CPT

## 2023-07-20 PROCEDURE — 97110 THERAPEUTIC EXERCISES: CPT

## 2023-07-20 NOTE — FLOWSHEET NOTE
function of   [x] LE: / Lumbar core, proximal hip and LE with self care and ADLs  [] UE / Cervical: cervical, postural, scapular, scapulothoracic and UE control with self care, carrying, lifting, driving, computer work.   [] (44183) Gait Re-education- Provided training and instruction to the patient for proper LE, core and proximal hip recruitment and positioning and eccentric body weight control with ambulation re-education including up and down stairs     Home Management Training / Self Care:  [] (55175) Provided self-care/home management training related to activities of daily living and compensatory training, and/or use of adaptive equipment for improvement with: ADLs and compensatory training, meal preparation, safety procedures and instruction in use of adaptive equipment, including bathing, grooming, dressing, personal hygiene, basic household cleaning and chores.      Home Exercise Program:    [] (50524) Reviewed/Progressed HEP activities related to strengthening, flexibility, endurance, ROM of   [] LE / Lumbar: core, proximal hip and LE for functional self-care, mobility, lifting and ambulation/stair navigation   [] UE / Cervical: cervical, postural, scapular, scapulothoracic and UE control with self care, reaching, carrying, lifting, house/yardwork, driving, computer work  [] (27245)Reviewed/Progressed HEP activities related to improving balance, coordination, kinesthetic sense, posture, motor skill, proprioception of   [] LE: core, proximal hip and LE for self care, mobility, lifting, and ambulation/stair navigation    [] UE / Cervical: cervical, postural,  scapular, scapulothoracic and UE control with self care, reaching, carrying, lifting, house/yardwork, driving, computer work    Manual Treatments:  PROM / STM / Oscillations-Mobs:  G-I, II, III, IV (PA's, Inf., Post.)  [] (89209) Provided manual therapy to mobilize LE, proximal hip and/or LS spine soft tissue/joints for the purpose of modulating pain,

## 2023-07-24 ENCOUNTER — APPOINTMENT (OUTPATIENT)
Dept: PHYSICAL THERAPY | Age: 63
End: 2023-07-24
Payer: COMMERCIAL

## 2023-07-28 ENCOUNTER — HOSPITAL ENCOUNTER (OUTPATIENT)
Dept: PHYSICAL THERAPY | Age: 63
Setting detail: THERAPIES SERIES
Discharge: HOME | End: 2023-07-28
Payer: COMMERCIAL

## 2023-07-28 PROCEDURE — 97530 THERAPEUTIC ACTIVITIES: CPT

## 2023-07-28 PROCEDURE — 97112 NEUROMUSCULAR REEDUCATION: CPT

## 2023-07-28 PROCEDURE — 97110 THERAPEUTIC EXERCISES: CPT

## 2023-07-28 NOTE — FLOWSHEET NOTE
tasks  [x]Sports/Recreation []Other:        ASSESSMENT:  Pt's AROM of R hip is gradually improving as pain and tightness lessens. Pt's R LE strength is steadily improving. Pt is ambulating with less pronounced limp when compared to week's prior and steady gains in LE ms strength is facilitating improved LE mechanics with functional ex's. Pt is tolerating gradual progressions to program well overall, without irritating hip and notes appropriate challenge and ms fatigue. Pt is making good progress towards established goals. Treatment/Activity Tolerance:  [x] Patient able to complete tx [] Patient limited by fatigue  [] Patient limited by pain  [] Patient limited by other medical complications  [] Other:     Prognosis: [x] Good [] Fair  [] Poor    Patient Requires Follow-up: [x] Yes  [] No    Plan for next treatment session: Continue hip/TA strengthening and balance training within tolerance. PLAN: See eval. PT 2x / week for 8 weeks. [x] Continue per plan of care [] Alter current plan (see comments)  [] Plan of care initiated [] Hold pending MD visit [] Discharge    Electronically signed by: Ayush Ellison, PTA, ATC      Note: If patient does not return for scheduled/ recommended follow up visits, this note will serve as a discharge from care along with most recent update on progress.

## 2023-07-31 ENCOUNTER — HOSPITAL ENCOUNTER (OUTPATIENT)
Dept: PHYSICAL THERAPY | Age: 63
Setting detail: THERAPIES SERIES
Discharge: HOME | End: 2023-07-31
Payer: COMMERCIAL

## 2023-07-31 PROCEDURE — 97112 NEUROMUSCULAR REEDUCATION: CPT

## 2023-07-31 PROCEDURE — 97110 THERAPEUTIC EXERCISES: CPT

## 2023-07-31 NOTE — PLAN OF CARE
30% disability or less to progress towards age appropriate recreational activity. [] Progressing: [x] Met: [] Not Met: [] Adjusted  2. Patient will demonstrate increased R hip flexion AROM to 120 to allow for increased ability to negotiate uneven surfaces while hiking. [] Progressing: [x] Met: [] Not Met: [] Adjusted  3. Patient will demonstrate an increase in R hip abduction strength to 5/5 in order to improve tolerance to prolonged walking. [x] Progressing: [] Met: [] Not Met: [] Adjusted  4. Patient will ambulate up/down 12 stairs reciprocally with no AD in order to safely negotiate stairs in home. [] Progressing: [x] Met: [] Not Met: [] Adjusted  5. Pt will demo 14 reps or more on 30 sec STS for improved indep transfers. [x] Progressing: [] Met: [] Not Met: [] Adjusted        Overall Progression Towards Functional goals/ Treatment Progress Update:  [x] Patient is progressing as expected towards functional goals listed. [] Progression is slowed due to complexities/Impairments listed. [] Progression has been slowed due to co-morbidities. [] Plan just implemented, too soon to assess goals progression <30days   [] Goals require adjustment due to lack of progress  [] Patient is not progressing as expected and requires additional follow up with physician  [] Other    Persisting Functional Limitations/Impairments:  []Sleeping []Sitting               []Standing []Transfers        []Walking []Kneeling               []Stairs []Squatting / bending   []ADLs []Reaching  []Lifting  []Housework  []Driving []Job related tasks  []Sports/Recreation []Other:        ASSESSMENT: See POC.     Treatment/Activity Tolerance:  [x] Patient able to complete tx [] Patient limited by fatigue  [] Patient limited by pain  [] Patient limited by other medical complications  [] Other:     Prognosis: [x] Good [] Fair  [] Poor    Patient Requires Follow-up: [x] Yes  [] No    Plan for next treatment session: Continue hip/TA

## 2023-08-04 ENCOUNTER — HOSPITAL ENCOUNTER (OUTPATIENT)
Dept: PHYSICAL THERAPY | Age: 63
Setting detail: THERAPIES SERIES
Discharge: HOME | End: 2023-08-04
Payer: COMMERCIAL

## 2023-08-04 PROCEDURE — 97530 THERAPEUTIC ACTIVITIES: CPT

## 2023-08-04 PROCEDURE — 97110 THERAPEUTIC EXERCISES: CPT

## 2023-08-04 NOTE — FLOWSHEET NOTE
re-injury. [] Progressing: [x] Met: [] Not Met: [] Adjusted  2. Patient will have a decrease in pain to a maximal score of 4/10 in order to increase tolerance to standing activities. [] Progressing: [x] Met: [] Not Met: [] Adjusted  3. Patient will ambulate 500' with no AD and no increase in symptoms to show ability to safely ambulate throughout community. [] Progressing: [x] Met: [] Not Met: [] Adjusted    Long Term Goals: To be achieved in:12 weeks  1. Pt will improve LEFS to 30% disability or less to progress towards age appropriate recreational activity. [] Progressing: [x] Met: [] Not Met: [] Adjusted  2. Patient will demonstrate increased R hip flexion AROM to 120 to allow for increased ability to negotiate uneven surfaces while hiking. [] Progressing: [x] Met: [] Not Met: [] Adjusted  3. Patient will demonstrate an increase in R hip abduction strength to 5/5 in order to improve tolerance to prolonged walking. [x] Progressing: [] Met: [] Not Met: [] Adjusted  4. Patient will ambulate up/down 12 stairs reciprocally with no AD in order to safely negotiate stairs in home. [] Progressing: [x] Met: [] Not Met: [] Adjusted  5. Pt will demo 14 reps or more on 30 sec STS for improved indep transfers. [x] Progressing: [] Met: [] Not Met: [] Adjusted        Overall Progression Towards Functional goals/ Treatment Progress Update:  [x] Patient is progressing as expected towards functional goals listed. [] Progression is slowed due to complexities/Impairments listed. [] Progression has been slowed due to co-morbidities.   [] Plan just implemented, too soon to assess goals progression <30days   [] Goals require adjustment due to lack of progress  [] Patient is not progressing as expected and requires additional follow up with physician  [] Other    Persisting Functional

## 2023-08-11 ENCOUNTER — HOSPITAL ENCOUNTER (OUTPATIENT)
Dept: PHYSICAL THERAPY | Age: 63
Setting detail: THERAPIES SERIES
Discharge: HOME | End: 2023-08-11
Payer: COMMERCIAL

## 2023-08-11 PROCEDURE — 97110 THERAPEUTIC EXERCISES: CPT

## 2023-08-11 PROCEDURE — 97530 THERAPEUTIC ACTIVITIES: CPT

## 2023-08-11 NOTE — FLOWSHEET NOTE
Manual Intervention (21169)        8/4                                       Modalities:     Pt. Education:  05/23/2023  -patient educated on diagnosis, prognosis and expectations for rehab  -all patient questions were answered    Home Exercise Program:  Access Code: [email or web address]  URL: [employer]/  Date: 08/11/2023  Prepared by: Elly Brink    Exercises  - Clamshell with Resistance  - 1 x daily - 7 x weekly - 3 sets - 10 reps  - Standard Lunge  - 1 x daily - 7 x weekly - 2 sets - 8 reps  - Reverse Lunge  - 1 x daily - 7 x weekly - 2 sets - 8 reps  - Forward Monster Walks  - 1 x daily - 7 x weekly - 3 sets - 10 reps  - Backward Monster Walks  - 1 x daily - 7 x weekly - 3 sets - 10 reps  - Side Stepping with Resistance at Ankles  - 1 x daily - 7 x weekly - 3 sets - 10 reps  - Supine March with Resistance Band  - 1 x daily - 7 x weekly - 2-3 sets - 12 reps  Access Code: [email or web address]  URL: [employer]/  Date: 06/27/2023  Prepared by: Elly Richfield    Exercises  - Side Stepping with Resistance at Ankles  - 1 x daily - 7 x weekly - 3 sets - 10 reps  - Single Leg Bridge  - 1 x daily - 7 x weekly - 3 sets - 8 reps  - Clamshell  - 1 x daily - 7 x weekly - 3 sets - 12 reps  - Standing Anti-Rotation Press with Anchored Resistance  - 1 x daily - 7 x weekly - 3 sets - 10 reps  - Sit to Stand Without Arm Support  - 1 x daily - 7 x weekly - 3 sets - 8 reps      Therapeutic Exercise and NMR EXR  [x] (71866) Provided verbal/tactile cueing for activities related to strengthening, flexibility, endurance, ROM for improvements in  [x] LE / Lumbar: LE, proximal hip, and core control with self care, mobility, lifting, ambulation.   [] UE / Cervical: cervical, postural, scapular, scapulothoracic and UE control with self care, reaching, carrying, lifting, house/yardwork, driving, computer work.  [] (61020) Provided verbal/tactile cueing for activities related to improving balance,

## 2023-08-14 ENCOUNTER — OFFICE VISIT (OUTPATIENT)
Dept: ORTHOPEDIC SURGERY | Age: 63
End: 2023-08-14

## 2023-08-14 VITALS — HEIGHT: 71 IN | WEIGHT: 194 LBS | BODY MASS INDEX: 27.16 KG/M2

## 2023-08-14 DIAGNOSIS — Z98.890 STATUS POST ARTHROSCOPY OF HIP: Primary | ICD-10-CM

## 2023-08-14 DIAGNOSIS — S73.191D TEAR OF RIGHT ACETABULAR LABRUM, SUBSEQUENT ENCOUNTER: ICD-10-CM

## 2023-08-14 DIAGNOSIS — M25.851 FEMOROACETABULAR IMPINGEMENT OF RIGHT HIP: ICD-10-CM

## 2023-08-14 PROCEDURE — 99024 POSTOP FOLLOW-UP VISIT: CPT | Performed by: ORTHOPAEDIC SURGERY

## 2023-08-14 NOTE — PROGRESS NOTES
[address] [facility]   [address], [address], [address]  Email: [email or web address]  Office: [phone]    08/14/23  1:05 PM      The encounter with Michaela Gayle was carried out by myself, Dr Karen Carrillo, who personally examined the patient and reviewed the plan. This dictation was performed with a verbal recognition program (DRAGON) and it was checked for errors. It is possible that there are still dictated errors within this office note. If so, please bring any errors to my attention for an addendum. All efforts were made to ensure that this office note is accurate.

## 2023-08-15 RX ORDER — ATORVASTATIN CALCIUM 40 MG/1
TABLET, FILM COATED ORAL
Qty: 90 TABLET | Refills: 3 | Status: SHIPPED | OUTPATIENT
Start: 2023-08-15

## 2023-08-18 ENCOUNTER — APPOINTMENT (OUTPATIENT)
Dept: PHYSICAL THERAPY | Age: 63
End: 2023-08-18
Payer: COMMERCIAL

## 2023-08-22 LAB
ALBUMIN SERPL-MCNC: 4.3 G/DL (ref 3.5–5.7)
ALP BLD-CCNC: 70 IU/L (ref 35–135)
ALT SERPL-CCNC: 12 IU/L (ref 10–60)
ANION GAP SERPL CALCULATED.3IONS-SCNC: 5 MMOL/L (ref 4–16)
AST SERPL-CCNC: 12 IU/L (ref 10–40)
BASOPHILS ABSOLUTE: 0.1 THOU/MCL (ref 0–0.2)
BASOPHILS ABSOLUTE: 1 %
BILIRUB SERPL-MCNC: 0.4 MG/DL (ref 0–1.2)
BUN BLDV-MCNC: 18 MG/DL (ref 8–26)
C-REACTIVE PROTEIN WIDE RANGE: 7 MG/L
CALCIUM SERPL-MCNC: 9.5 MG/DL (ref 8.5–10.4)
CHLORIDE BLD-SCNC: 100 MEQ/L (ref 98–111)
CO2: 32 MMOL/L (ref 21–31)
CREAT SERPL-MCNC: 0.97 MG/DL (ref 0.7–1.3)
CREATINE KINASE ISOENZYMES, SER/PLAS: 52 IU/L (ref 30–233)
EGFR (CKD-EPI): 87 ML/MIN/1.73 M2
EOSINOPHILS ABSOLUTE: 0.2 THOU/MCL (ref 0.03–0.45)
EOSINOPHILS RELATIVE PERCENT: 2 %
GLUCOSE BLD-MCNC: 107 MG/DL (ref 70–99)
HCT VFR BLD CALC: 40.2 % (ref 40–50)
HEMOGLOBIN: 13.6 G/DL (ref 13.5–16.5)
HEPATITIS C VIRUS RNA SER/PLAS NCNC: NONREACTIVE
LYMPHOCYTES ABSOLUTE: 1.9 THOU/MCL (ref 1–4)
LYMPHOCYTES RELATIVE PERCENT: 27 %
MCH RBC QN AUTO: 31.1 PG (ref 27–33)
MCHC RBC AUTO-ENTMCNC: 33.9 G/DL (ref 32–36)
MCV RBC AUTO: 91.8 FL (ref 82–97)
MONOCYTES # BLD: 7 %
MONOCYTES ABSOLUTE: 0.5 THOU/MCL (ref 0.2–0.9)
NEUTROPHILS ABSOLUTE: 4.2 THOU/MCL (ref 1.8–7.7)
PDW BLD-RTO: 12.8 % (ref 12.3–17)
PLATELET # BLD: 394 THOU/MCL (ref 140–375)
PMV BLD AUTO: 6.6 FL (ref 7.4–11.5)
POTASSIUM SERPL-SCNC: 4.5 MEQ/L (ref 3.6–5.1)
RBC # BLD: 4.38 MIL/MCL (ref 4.4–5.8)
SEDIMENTATION RATE, ERYTHROCYTE: 10 MM/HR (ref 0–20)
SEG NEUTROPHILS: 63 %
SODIUM BLD-SCNC: 137 MEQ/L (ref 135–145)
TOTAL PROTEIN: 7.6 G/DL (ref 6–8)
WBC # BLD: 6.8 THOU/MCL (ref 3.6–10.5)

## 2023-08-23 LAB
HEPATITIS B CORE IGM ANTIBODY: NONREACTIVE
HEPATITIS B SURF AG,XHBAGS: NONREACTIVE
TSH ULTRASENSITIVE: 1.44 MCIU/ML (ref 0.27–4.2)
VITAMIN D 25-HYDROXY: 34.2 NG/ML (ref 30–150)

## 2023-08-25 ENCOUNTER — HOSPITAL ENCOUNTER (OUTPATIENT)
Dept: PHYSICAL THERAPY | Age: 63
Setting detail: THERAPIES SERIES
Discharge: HOME | End: 2023-08-25
Payer: COMMERCIAL

## 2023-08-25 PROCEDURE — 97530 THERAPEUTIC ACTIVITIES: CPT

## 2023-08-25 PROCEDURE — 97110 THERAPEUTIC EXERCISES: CPT

## 2023-08-25 PROCEDURE — 97140 MANUAL THERAPY 1/> REGIONS: CPT

## 2023-08-25 NOTE — PLAN OF CARE
of gait cycle. ^ LP from 75# to 80#; ^ Stefanie Crock from 6\" to 8\"    7/20: 30s STS: 8    PROM AROM     L R L R   Knee Flexion           Knee Extension           Hip IR    30   Hip ER     40 34   Hip flexion    116   120   Hip ABD       37, p!      6/23: AROM R hip flex 103, abd 25, ER 35  6/5: Pt ambulating into and around clinic without AD exhibiting decreased R stride length, stance time and hip extension through MS-HO phase. RESTRICTIONS/PRECAUTIONS: 5/16 RIGHT HIP DIAGNOSTIC ARTHROSCOPY, LABRAL REPAIR, FEMOROACETABULAR IMPINGEMENT DECOMPRESSION. Currently TTWB until week 2. Begin WBAT with crutches in week 2. See rest of protocol in teams.   Cleared for phase 3 by MD on 7/3    Exercises/Interventions:     Therapeutic Exercises (88982) Resistance / level Sets/sec Reps Notes   Upright bike  5'     IB    HEP update 8'       Hip ER stretch  30\" 3             clam purple 3 10 8/11         Standing hip flex lime 2 10R    SLR  SL ABD 10\" 10ea 7/31   Crossover step up 8\" 2 12 7/31   Monster walk and lateral purple 2 laps Length of mirror 7/31   SL , 125, 130# 1,1,1 10, 10, 8 6/27 6/30: cue for control  7/20:^wt; 8/11 inc wt   Hooklying march with TB around midfoot lime 2 10 8/4   GSB alternating hip flexion with TB around midfoot  lime 1 10 8/4   Walking march with isometric holds at 90 with hip   1 lap length of clinic    Therapeutic Activities (30464)             6/30:^ht, set/rep   6/23:^ht   6/19 6/23:^set   Forward lunge to reverse lunge  1 8ea direction bilaterally 8/4: held d/t anterior hip pain   POC assessment and pt education on POC and progress 15 7/31, 8/25 8/4   RDL 20# 2 10 8/4   Step up with knee drive- TB on foot for knee drive 8 in 1 15Z           Gait (27633)       GT, TE          Pt education on crutch fitting, gait with crutches and stair training             Neuromuscular Re-ed (75773)       Biodex weight shifting       AP and ML board isometrics    Tandem on airex 6/13   Anti rotation walkout

## 2023-09-01 ENCOUNTER — HOSPITAL ENCOUNTER (OUTPATIENT)
Dept: PHYSICAL THERAPY | Age: 63
Setting detail: THERAPIES SERIES
Discharge: HOME | End: 2023-09-01
Payer: COMMERCIAL

## 2023-09-01 PROCEDURE — 97110 THERAPEUTIC EXERCISES: CPT

## 2023-09-01 PROCEDURE — 97530 THERAPEUTIC ACTIVITIES: CPT

## 2023-09-01 NOTE — FLOWSHEET NOTE
progress  [] Patient is not progressing as expected and requires additional follow up with physician  [] Other    Persisting Functional Limitations/Impairments:  []Sleeping []Sitting               []Standing []Transfers        []Walking []Kneeling               []Stairs []Squatting / bending   []ADLs []Reaching  []Lifting  []Housework  []Driving []Job related tasks  []Sports/Recreation [x]Other: dressing        ASSESSMENT: Cont'd to progress strength, endurance, balance and stability program within pt's tolerances, to promote controlled LE mobility with functional tasks. Pt is responding well to gradual progressions noting appropriate challenge and ms fatigue throughout and following current program. Pt is making very good progress towards remaining goal.     Treatment/Activity Tolerance:  [x] Patient able to complete tx [] Patient limited by fatigue  [] Patient limited by pain  [] Patient limited by other medical complications  [] Other:     Prognosis: [x] Good [] Fair  [] Poor    Patient Requires Follow-up: [x] Yes  [] No    Plan for next treatment session: Continue hip/TA strengthening and balance training within tolerance. PLAN: cont. PT 1x / week for 4 weeks. [x] Continue per plan of care [] Alter current plan (see comments)  [] Plan of care initiated [] Hold pending MD visit [] Discharge    Electronically signed by: Nellie Mccloud PTA, ATC      Note: If patient does not return for scheduled/ recommended follow up visits, this note will serve as a discharge from care along with most recent update on progress.

## 2023-09-07 ENCOUNTER — APPOINTMENT (OUTPATIENT)
Dept: GENERAL RADIOLOGY | Age: 63
End: 2023-09-07
Payer: COMMERCIAL

## 2023-09-07 ENCOUNTER — APPOINTMENT (OUTPATIENT)
Dept: CT IMAGING | Age: 63
End: 2023-09-07
Payer: COMMERCIAL

## 2023-09-07 ENCOUNTER — HOSPITAL ENCOUNTER (INPATIENT)
Age: 63
LOS: 1 days | Discharge: HOME | End: 2023-09-08
Attending: STUDENT IN AN ORGANIZED HEALTH CARE EDUCATION/TRAINING PROGRAM | Admitting: STUDENT IN AN ORGANIZED HEALTH CARE EDUCATION/TRAINING PROGRAM
Payer: COMMERCIAL

## 2023-09-07 ENCOUNTER — HOSPITAL ENCOUNTER (OUTPATIENT)
Dept: PHYSICAL THERAPY | Age: 63
Setting detail: THERAPIES SERIES
Discharge: HOME | End: 2023-09-07
Payer: COMMERCIAL

## 2023-09-07 DIAGNOSIS — H53.47 HEMIANOPSIA: Primary | ICD-10-CM

## 2023-09-07 DIAGNOSIS — H53.121 TRANSIENT VISUAL LOSS, RIGHT EYE: ICD-10-CM

## 2023-09-07 PROBLEM — H53.122 TRANSIENT VISUAL LOSS OF LEFT EYE: Status: ACTIVE | Noted: 2022-07-25

## 2023-09-07 PROBLEM — Z86.73 HISTORY OF CVA (CEREBROVASCULAR ACCIDENT): Status: ACTIVE | Noted: 2023-09-07

## 2023-09-07 LAB
ALBUMIN SERPL-MCNC: 4.1 G/DL (ref 3.4–5)
ALBUMIN/GLOB SERPL: 1.3 {RATIO} (ref 1.1–2.2)
ALP SERPL-CCNC: 71 U/L (ref 40–129)
ALT SERPL-CCNC: 30 U/L (ref 10–40)
ANION GAP SERPL CALCULATED.3IONS-SCNC: 9 MMOL/L (ref 3–16)
AST SERPL-CCNC: 38 U/L (ref 15–37)
BASOPHILS # BLD: 0.1 K/UL (ref 0–0.2)
BASOPHILS NFR BLD: 1 %
BILIRUB SERPL-MCNC: 0.5 MG/DL (ref 0–1)
BUN SERPL-MCNC: 33 MG/DL (ref 7–20)
CALCIUM SERPL-MCNC: 9.1 MG/DL (ref 8.3–10.6)
CHLORIDE SERPL-SCNC: 102 MMOL/L (ref 99–110)
CO2 SERPL-SCNC: 25 MMOL/L (ref 21–32)
CREAT SERPL-MCNC: 1 MG/DL (ref 0.8–1.3)
DEPRECATED RDW RBC AUTO: 13.8 % (ref 12.4–15.4)
EKG ATRIAL RATE: 56 BPM
EKG DIAGNOSIS: NORMAL
EKG P AXIS: 51 DEGREES
EKG P-R INTERVAL: 146 MS
EKG Q-T INTERVAL: 450 MS
EKG QRS DURATION: 144 MS
EKG QTC CALCULATION (BAZETT): 434 MS
EKG R AXIS: 76 DEGREES
EKG T AXIS: 41 DEGREES
EKG VENTRICULAR RATE: 56 BPM
EOSINOPHIL # BLD: 0 K/UL (ref 0–0.6)
EOSINOPHIL NFR BLD: 0.5 %
GFR SERPLBLD CREATININE-BSD FMLA CKD-EPI: >60 ML/MIN/{1.73_M2}
GLUCOSE BLD-MCNC: 108 MG/DL (ref 70–99)
GLUCOSE SERPL-MCNC: 115 MG/DL (ref 70–99)
HCT VFR BLD AUTO: 40.6 % (ref 40.5–52.5)
HGB BLD-MCNC: 13.5 G/DL (ref 13.5–17.5)
INR PPP: 0.94 (ref 0.84–1.16)
LYMPHOCYTES # BLD: 0.9 K/UL (ref 1–5.1)
LYMPHOCYTES NFR BLD: 10.2 %
MCH RBC QN AUTO: 30.9 PG (ref 26–34)
MCHC RBC AUTO-ENTMCNC: 33.1 G/DL (ref 31–36)
MCV RBC AUTO: 93.2 FL (ref 80–100)
MONOCYTES # BLD: 0.2 K/UL (ref 0–1.3)
MONOCYTES NFR BLD: 2.7 %
NEUTROPHILS # BLD: 7.5 K/UL (ref 1.7–7.7)
NEUTROPHILS NFR BLD: 85.6 %
PERFORMED ON: ABNORMAL
PLATELET # BLD AUTO: 328 K/UL (ref 135–450)
PMV BLD AUTO: 7 FL (ref 5–10.5)
POTASSIUM SERPL-SCNC: 4.7 MMOL/L (ref 3.5–5.1)
PROT SERPL-MCNC: 7.2 G/DL (ref 6.4–8.2)
PROTHROMBIN TIME: 12.6 SEC (ref 11.5–14.8)
RBC # BLD AUTO: 4.36 M/UL (ref 4.2–5.9)
SODIUM SERPL-SCNC: 136 MMOL/L (ref 136–145)
TROPONIN, HIGH SENSITIVITY: 11 NG/L (ref 0–22)
WBC # BLD AUTO: 8.7 K/UL (ref 4–11)

## 2023-09-07 PROCEDURE — 97530 THERAPEUTIC ACTIVITIES: CPT

## 2023-09-07 PROCEDURE — 6360000004 HC RX CONTRAST MEDICATION: Performed by: PHYSICIAN ASSISTANT

## 2023-09-07 PROCEDURE — 80053 COMPREHEN METABOLIC PANEL: CPT

## 2023-09-07 PROCEDURE — 6370000000 HC RX 637 (ALT 250 FOR IP): Performed by: STUDENT IN AN ORGANIZED HEALTH CARE EDUCATION/TRAINING PROGRAM

## 2023-09-07 PROCEDURE — 97110 THERAPEUTIC EXERCISES: CPT

## 2023-09-07 PROCEDURE — 84484 ASSAY OF TROPONIN QUANT: CPT

## 2023-09-07 PROCEDURE — 99285 EMERGENCY DEPT VISIT HI MDM: CPT

## 2023-09-07 PROCEDURE — 93005 ELECTROCARDIOGRAM TRACING: CPT | Performed by: PHYSICIAN ASSISTANT

## 2023-09-07 PROCEDURE — 2580000003 HC RX 258: Performed by: STUDENT IN AN ORGANIZED HEALTH CARE EDUCATION/TRAINING PROGRAM

## 2023-09-07 PROCEDURE — 70498 CT ANGIOGRAPHY NECK: CPT

## 2023-09-07 PROCEDURE — 85610 PROTHROMBIN TIME: CPT

## 2023-09-07 PROCEDURE — 85025 COMPLETE CBC W/AUTO DIFF WBC: CPT

## 2023-09-07 PROCEDURE — 93010 ELECTROCARDIOGRAM REPORT: CPT | Performed by: INTERNAL MEDICINE

## 2023-09-07 PROCEDURE — 71045 X-RAY EXAM CHEST 1 VIEW: CPT

## 2023-09-07 PROCEDURE — 6370000000 HC RX 637 (ALT 250 FOR IP): Performed by: PHYSICIAN ASSISTANT

## 2023-09-07 PROCEDURE — 70450 CT HEAD/BRAIN W/O DYE: CPT

## 2023-09-07 PROCEDURE — 2060000000 HC ICU INTERMEDIATE R&B

## 2023-09-07 RX ORDER — ONDANSETRON 4 MG/1
4 TABLET, ORALLY DISINTEGRATING ORAL EVERY 8 HOURS PRN
Status: DISCONTINUED | OUTPATIENT
Start: 2023-09-07 | End: 2023-09-08 | Stop reason: HOSPADM

## 2023-09-07 RX ORDER — SODIUM CHLORIDE 0.9 % (FLUSH) 0.9 %
5-40 SYRINGE (ML) INJECTION PRN
Status: DISCONTINUED | OUTPATIENT
Start: 2023-09-07 | End: 2023-09-08 | Stop reason: HOSPADM

## 2023-09-07 RX ORDER — ONDANSETRON 2 MG/ML
4 INJECTION INTRAMUSCULAR; INTRAVENOUS EVERY 6 HOURS PRN
Status: DISCONTINUED | OUTPATIENT
Start: 2023-09-07 | End: 2023-09-08 | Stop reason: HOSPADM

## 2023-09-07 RX ORDER — SODIUM CHLORIDE 9 MG/ML
INJECTION, SOLUTION INTRAVENOUS PRN
Status: DISCONTINUED | OUTPATIENT
Start: 2023-09-07 | End: 2023-09-08 | Stop reason: HOSPADM

## 2023-09-07 RX ORDER — ASPIRIN 81 MG/1
162 TABLET, CHEWABLE ORAL ONCE
Status: COMPLETED | OUTPATIENT
Start: 2023-09-07 | End: 2023-09-07

## 2023-09-07 RX ORDER — PANTOPRAZOLE SODIUM 40 MG/10ML
40 INJECTION, POWDER, LYOPHILIZED, FOR SOLUTION INTRAVENOUS DAILY
Status: DISCONTINUED | OUTPATIENT
Start: 2023-09-07 | End: 2023-09-08 | Stop reason: HOSPADM

## 2023-09-07 RX ORDER — ENOXAPARIN SODIUM 100 MG/ML
40 INJECTION SUBCUTANEOUS DAILY
Status: DISCONTINUED | OUTPATIENT
Start: 2023-09-07 | End: 2023-09-08 | Stop reason: HOSPADM

## 2023-09-07 RX ORDER — ACETAMINOPHEN 650 MG/1
650 SUPPOSITORY RECTAL EVERY 6 HOURS PRN
Status: DISCONTINUED | OUTPATIENT
Start: 2023-09-07 | End: 2023-09-08 | Stop reason: HOSPADM

## 2023-09-07 RX ORDER — ATORVASTATIN CALCIUM 80 MG/1
80 TABLET, FILM COATED ORAL NIGHTLY
Status: DISCONTINUED | OUTPATIENT
Start: 2023-09-07 | End: 2023-09-08 | Stop reason: HOSPADM

## 2023-09-07 RX ORDER — ASPIRIN 81 MG/1
81 TABLET, CHEWABLE ORAL DAILY
Status: DISCONTINUED | OUTPATIENT
Start: 2023-09-08 | End: 2023-09-08 | Stop reason: HOSPADM

## 2023-09-07 RX ORDER — ACETAMINOPHEN 325 MG/1
650 TABLET ORAL EVERY 6 HOURS PRN
Status: DISCONTINUED | OUTPATIENT
Start: 2023-09-07 | End: 2023-09-08 | Stop reason: HOSPADM

## 2023-09-07 RX ORDER — LISINOPRIL 10 MG/1
10 TABLET ORAL NIGHTLY
Status: DISCONTINUED | OUTPATIENT
Start: 2023-09-07 | End: 2023-09-08 | Stop reason: HOSPADM

## 2023-09-07 RX ORDER — POLYETHYLENE GLYCOL 3350 17 G/17G
17 POWDER, FOR SOLUTION ORAL DAILY PRN
Status: DISCONTINUED | OUTPATIENT
Start: 2023-09-07 | End: 2023-09-08 | Stop reason: HOSPADM

## 2023-09-07 RX ORDER — SODIUM CHLORIDE 0.9 % (FLUSH) 0.9 %
5-40 SYRINGE (ML) INJECTION EVERY 12 HOURS SCHEDULED
Status: DISCONTINUED | OUTPATIENT
Start: 2023-09-07 | End: 2023-09-08 | Stop reason: HOSPADM

## 2023-09-07 RX ORDER — ASPIRIN 300 MG/1
300 SUPPOSITORY RECTAL DAILY
Status: DISCONTINUED | OUTPATIENT
Start: 2023-09-08 | End: 2023-09-08 | Stop reason: HOSPADM

## 2023-09-07 RX ADMIN — SODIUM CHLORIDE, PRESERVATIVE FREE 10 ML: 5 INJECTION INTRAVENOUS at 20:21

## 2023-09-07 RX ADMIN — ATORVASTATIN CALCIUM 80 MG: 80 TABLET, FILM COATED ORAL at 20:16

## 2023-09-07 RX ADMIN — IOPAMIDOL 75 ML: 755 INJECTION, SOLUTION INTRAVENOUS at 15:31

## 2023-09-07 RX ADMIN — ASPIRIN 162 MG: 81 TABLET, CHEWABLE ORAL at 16:52

## 2023-09-07 RX ADMIN — LISINOPRIL 10 MG: 10 TABLET ORAL at 20:16

## 2023-09-07 ASSESSMENT — PAIN - FUNCTIONAL ASSESSMENT: PAIN_FUNCTIONAL_ASSESSMENT: NONE - DENIES PAIN

## 2023-09-07 NOTE — ED NOTES
ED TO INPATIENT SBAR HANDOFF    Patient Name: Shaquille Hwang   :  1960  57 y.o. MRN:  [id number]  Preferred Name  Edgar Foster   ED Room #:  HU-5631/77  Family/Caregiver Present yes   Restraints no   Sitter no   Sepsis Risk Score Sepsis Risk Score: 0.99    Situation  Code Status: Prior No additional code details. Allergies: Amoxicillin and Codeine  Weight: Patient Vitals for the past 96 hrs (Last 3 readings):   Weight   23 1335 189 lb 4.8 oz (85.9 kg)     Arrived from: home  Chief Complaint:   Chief Complaint   Patient presents with    Loss of Vision     Loss of vision in left eye for ~5 minutes, LKW 1 hour ago. History of stroke. Hospital Problem/Diagnosis:  Principal Problem:    Transient visual loss of right eye  Active Problems:    Hyperlipidemia    Benign essential HTN  Resolved Problems:    * No resolved hospital problems. *    Imaging:   CTA HEAD NECK W CONTRAST   Final Result   1. No acute intracranial abnormality. 2. There is suggestion of severe stenosis involving the right supraclinoid   internal carotid artery. 3. Moderate focal stenosis involving the V4 segment of the left vertebral   artery. 4. Otherwise, no significant stenosis or large vessel occlusion of the   efcgdk-us-Ofskvw. 5. No flow limiting stenosis seen of the cervical carotid/vertebral arteries. CT HEAD WO CONTRAST   Final Result   1. No acute intracranial abnormality. 2. There is suggestion of severe stenosis involving the right supraclinoid   internal carotid artery. 3. Moderate focal stenosis involving the V4 segment of the left vertebral   artery. 4. Otherwise, no significant stenosis or large vessel occlusion of the   qcbjgd-hi-Fsfpan. 5. No flow limiting stenosis seen of the cervical carotid/vertebral arteries. XR CHEST PORTABLE   Final Result   No radiographic evidence of acute pulmonary disease.          MRI brain without contrast    (Results Pending)     Abnormal labs:   Abnormal Pass  Loreto Coma Scale (GCS): L[place]to Coma Scale  Eye Opening: Spontaneous  Best Verbal Response: Oriented  Best Motor Response: Obeys commands  Loreto Coma Scale Score: 15  Active LDA's:   Peripheral IV 09/07/23 Right Antecubital (Active)     PO Status: Nothing by Mouth  Pertinent or High Risk Medications/Drips: no   If Yes, please provide details:   Pending Blood Product Administration: no       You may also review the ED PT Care Timeline found under the Summary Nursing Index tab. Recommendation    Pending orders   Plan for Discharge (if known):    Additional Comments:    If any further questions, please call Sending RN at     Electronically signed by: Electronically signed by Kisha Burton RN on 9/7/2023 at 5:16 PM       Kisha Burton RN  09/07/23 [id number]

## 2023-09-07 NOTE — ACP (ADVANCE CARE PLANNING)
Advanced Care Planning Note. Purpose of Encounter: Advanced care planning in light of vision loss  Parties In Attendance: Patient,   Decisional Capacity: Yes  Subjective: vision loss - now resolved  Objective: Cr 1.0  Goals of Care Determination: Patient/POA wishes to seek full treatment  Plan:  CT head. MRI. Vascular surgery consult. Neurology consult. Code Status: Full code    Time spent on Advanced care Plannin minutes  Advanced Care Planning Documents: Completed advanced directives on chart, daughter, Con Bassett, is the POA.     Nolberto Gonzalez DO  2023 5:04 PM

## 2023-09-07 NOTE — FLOWSHEET NOTE
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 Insurance/Certification information:  PT Insurance Information: anthem, no auth, ded met, no copay  Physician Information:  Shana Hamilton MD    Plan of care signed (Y/N): [x]  Yes []  No     Date of Patient follow up with Physician:      Progress Report: [x]  Yes  []  No     Date Range for reporting period:  Beginnin2023  PN: 2023  POC: ,   Ending:     Progress report due (10 Rx/or 30 days whichever is less): visit #52 or     Recertification due (POC duration/ or 90 days whichever is less): visit #28 or     Visit # Insurance Allowable Auth required? Date Range      60 []  Yes  [x]  No      Latex Allergy:  [x]NO      []YES  Preferred Language for Healthcare:   [x]English       []other:    Functional Scale:       Date assessed:  LEFS: raw score = 24; dysfunction = 70%  23  LEFS: raw score = 39; dysfunction = 51.25% 23  LEFS: raw score = 70; dysfunction = 12.5%            23  LEFS: raw score = 70; dysfunction = 12.5%                Pain level:  0/10     SUBJECTIVE:  Pt is performing all desired ADL's and recreational activities without limitations. Pt states R hip remains pain free.        OBJECTIVE: NEEDS OBJECTIVE MEASURE 1/wk  :     No TTP hip flex or lateral hip   30 sec STS 15  Hip AROM  R ER 30, IR 30; L ER 30,  IR 40  Strength (0-5) - Left Right   Hip Flexion - supine  4+  3+   Hip Flexion - seated  5 3+    Hip Abduction  5  4-   Hip ext 5 5   Hip ER  IR    5  5   Quads

## 2023-09-07 NOTE — PROGRESS NOTES
Pharmacy Home Medication Reconciliation Note    A medication reconciliation has been completed for Andrés Escobar 1960    Pharmacy: [facility] [address] [place] [address]. [facility],[address]    Information provided by: Patient    The patient's home medication list is as follows: No current facility-administered medications on file prior to encounter. Current Outpatient Medications on File Prior to Encounter   Medication Sig Dispense Refill    atorvastatin (LIPITOR) 40 MG tablet TAKE 1 TABLET NIGHTLY (Patient taking differently: Take 1 tablet by mouth nightly TAKE 1 TABLET NIGHTLY) 90 tablet 3    Diclofenac Sodium POWD Apply 1-2 g topically 3 times daily as needed (As needed for pain) Ketamine 5% Gabapentin 5% Cyclobenzaprine 2% Diclofenac 3% Lidocaine 3% 100 g 3    meloxicam (MOBIC) 15 MG tablet Take 1 tablet by mouth daily (with breakfast) (Patient not taking: Reported on 8/14/2023) 90 tablet 1    Omega 3 1000 MG CAPS Take 1 capsule by mouth nightly      lisinopril (PRINIVIL;ZESTRIL) 10 MG tablet TAKE 1 TABLET BY MOUTH DAILY (Patient taking differently: Take 1 tablet by mouth nightly TAKE 1 TABLET BY MOUTH DAILY) 90 tablet 3    Cetirizine HCl (ZYRTEC ALLERGY PO)   Take 10 mg by mouth daily          Patient is no longer taking meloxicam 15MG    Of note, Patient stated they take all their medications at night    Timing of last doses updated.     Thank you,  Clarence Hector CPhT

## 2023-09-07 NOTE — PROGRESS NOTES
Admission nurse arrived at ED 22, but Dr. Haroon Almanza in room assessing patient. Admission not initiated. Will return as time allows.

## 2023-09-07 NOTE — ED PROVIDER NOTES
[facility]        Pt Name: Marisela Johnson  MRN: [id number]  [address] 1960  Date of evaluation: 9/7/2023  Provider: Jyoti Villegas PA-C  PCP: Saumya Young DO  Note Started: 2:45 PM EDT 9/7/23      ABDI. I have evaluated this patient. CHIEF COMPLAINT       Chief Complaint   Patient presents with    Loss of Vision     Loss of vision in left eye for ~5 minutes, LKW 1 hour ago. History of stroke. HISTORY OF PRESENT ILLNESS: 1 or more Elements     History From: patient  Limitations to history : None    Marisela Johnson is a 57 y.o. male who presents to the emergency department with a chief complaint of vision loss in his left eye that lasted for about 5 or 10 minutes. He states he bent down and when he stood back up his vision only in his left eye went purely black. He states he began getting flashes of light at the end and that his vision came back. This is happened in the past and he is seeing ophthalmology and was told that it could have been a clot that caused this but never had imaging. He does have previous history of subarachnoid hemorrhage. States he does have a mild headache now but this does not feel like the headache that he had at that time as that was the worst headache of his life. Denies difficulty moving his extremities, numbness, speech difficulty, chest pain, shortness of breath, abdominal pain or any other symptoms. He denies any symptoms now that he is here in the emergency room. He has history of hypertension and hyperlipidemia. Nursing Notes were all reviewed and agreed with or any disagreements were addressed in the HPI. REVIEW OF SYSTEMS :      Review of Systems    Positives and Pertinent negatives as per HPI.      SURGICAL HISTORY     Past Surgical History:   Procedure Laterality Date    COLONOSCOPY  01/01/2001    COLONOSCOPY  01/01/2005    COLONOSCOPY  03/01/2010    Dr. Danielle Peters

## 2023-09-08 VITALS
TEMPERATURE: 97.5 F | BODY MASS INDEX: 27.16 KG/M2 | HEIGHT: 71 IN | OXYGEN SATURATION: 95 % | SYSTOLIC BLOOD PRESSURE: 108 MMHG | RESPIRATION RATE: 16 BRPM | HEART RATE: 61 BPM | DIASTOLIC BLOOD PRESSURE: 62 MMHG | WEIGHT: 194 LBS

## 2023-09-08 PROBLEM — H53.47 HEMIANOPSIA: Status: ACTIVE | Noted: 2023-09-08

## 2023-09-08 PROBLEM — G45.1 TIA INVOLVING LEFT INTERNAL CAROTID ARTERY: Status: ACTIVE | Noted: 2023-09-08

## 2023-09-08 LAB
ALBUMIN SERPL-MCNC: 3.7 G/DL (ref 3.4–5)
ANION GAP SERPL CALCULATED.3IONS-SCNC: 7 MMOL/L (ref 3–16)
BASOPHILS # BLD: 0.1 K/UL (ref 0–0.2)
BASOPHILS NFR BLD: 1.3 %
BUN SERPL-MCNC: 26 MG/DL (ref 7–20)
CALCIUM SERPL-MCNC: 8.6 MG/DL (ref 8.3–10.6)
CHLORIDE SERPL-SCNC: 105 MMOL/L (ref 99–110)
CHOLEST SERPL-MCNC: 143 MG/DL (ref 0–199)
CO2 SERPL-SCNC: 25 MMOL/L (ref 21–32)
CREAT SERPL-MCNC: 0.9 MG/DL (ref 0.8–1.3)
DEPRECATED RDW RBC AUTO: 13.4 % (ref 12.4–15.4)
EOSINOPHIL # BLD: 0.2 K/UL (ref 0–0.6)
EOSINOPHIL NFR BLD: 3.3 %
EST. AVERAGE GLUCOSE BLD GHB EST-MCNC: 116.9 MG/DL
GFR SERPLBLD CREATININE-BSD FMLA CKD-EPI: >60 ML/MIN/{1.73_M2}
GLUCOSE SERPL-MCNC: 92 MG/DL (ref 70–99)
HBA1C MFR BLD: 5.7 %
HCT VFR BLD AUTO: 40 % (ref 40.5–52.5)
HDLC SERPL-MCNC: 58 MG/DL (ref 40–60)
HGB BLD-MCNC: 13.1 G/DL (ref 13.5–17.5)
LDLC SERPL CALC-MCNC: 75 MG/DL
LYMPHOCYTES # BLD: 1.9 K/UL (ref 1–5.1)
LYMPHOCYTES NFR BLD: 33 %
MAGNESIUM SERPL-MCNC: 2.1 MG/DL (ref 1.8–2.4)
MCH RBC QN AUTO: 30.6 PG (ref 26–34)
MCHC RBC AUTO-ENTMCNC: 32.7 G/DL (ref 31–36)
MCV RBC AUTO: 93.6 FL (ref 80–100)
MONOCYTES # BLD: 0.5 K/UL (ref 0–1.3)
MONOCYTES NFR BLD: 8.6 %
NEUTROPHILS # BLD: 3.1 K/UL (ref 1.7–7.7)
NEUTROPHILS NFR BLD: 53.8 %
PHOSPHATE SERPL-MCNC: 3.5 MG/DL (ref 2.5–4.9)
PLATELET # BLD AUTO: 305 K/UL (ref 135–450)
PMV BLD AUTO: 7.2 FL (ref 5–10.5)
POTASSIUM SERPL-SCNC: 4.4 MMOL/L (ref 3.5–5.1)
RBC # BLD AUTO: 4.27 M/UL (ref 4.2–5.9)
SODIUM SERPL-SCNC: 137 MMOL/L (ref 136–145)
TRIGL SERPL-MCNC: 51 MG/DL (ref 0–150)
VLDLC SERPL CALC-MCNC: 10 MG/DL
WBC # BLD AUTO: 5.8 K/UL (ref 4–11)

## 2023-09-08 PROCEDURE — 2580000003 HC RX 258: Performed by: STUDENT IN AN ORGANIZED HEALTH CARE EDUCATION/TRAINING PROGRAM

## 2023-09-08 PROCEDURE — APPNB30 APP NON BILLABLE TIME 0-30 MINS

## 2023-09-08 PROCEDURE — 97530 THERAPEUTIC ACTIVITIES: CPT

## 2023-09-08 PROCEDURE — 99222 1ST HOSP IP/OBS MODERATE 55: CPT | Performed by: PSYCHIATRY & NEUROLOGY

## 2023-09-08 PROCEDURE — APPSS60 APP SPLIT SHARED TIME 46-60 MINUTES: Performed by: NURSE PRACTITIONER

## 2023-09-08 PROCEDURE — 97165 OT EVAL LOW COMPLEX 30 MIN: CPT

## 2023-09-08 PROCEDURE — 83036 HEMOGLOBIN GLYCOSYLATED A1C: CPT

## 2023-09-08 PROCEDURE — 97161 PT EVAL LOW COMPLEX 20 MIN: CPT

## 2023-09-08 PROCEDURE — 36415 COLL VENOUS BLD VENIPUNCTURE: CPT

## 2023-09-08 PROCEDURE — APPSS45 APP SPLIT SHARED TIME 31-45 MINUTES

## 2023-09-08 PROCEDURE — 80069 RENAL FUNCTION PANEL: CPT

## 2023-09-08 PROCEDURE — APPNB30 APP NON BILLABLE TIME 0-30 MINS: Performed by: NURSE PRACTITIONER

## 2023-09-08 PROCEDURE — 80061 LIPID PANEL: CPT

## 2023-09-08 PROCEDURE — C9113 INJ PANTOPRAZOLE SODIUM, VIA: HCPCS | Performed by: STUDENT IN AN ORGANIZED HEALTH CARE EDUCATION/TRAINING PROGRAM

## 2023-09-08 PROCEDURE — 85025 COMPLETE CBC W/AUTO DIFF WBC: CPT

## 2023-09-08 PROCEDURE — 6370000000 HC RX 637 (ALT 250 FOR IP): Performed by: NURSE PRACTITIONER

## 2023-09-08 PROCEDURE — 6360000002 HC RX W HCPCS: Performed by: STUDENT IN AN ORGANIZED HEALTH CARE EDUCATION/TRAINING PROGRAM

## 2023-09-08 PROCEDURE — 83735 ASSAY OF MAGNESIUM: CPT

## 2023-09-08 RX ORDER — ASPIRIN 81 MG/1
81 TABLET ORAL DAILY
Qty: 30 TABLET | Refills: 2 | Status: SHIPPED | OUTPATIENT
Start: 2023-09-08

## 2023-09-08 RX ORDER — PREDNISONE 10 MG/1
10 TABLET ORAL EVERY MORNING
Status: DISCONTINUED | OUTPATIENT
Start: 2023-09-09 | End: 2023-09-08

## 2023-09-08 RX ORDER — PREDNISONE 5 MG/1
5 TABLET ORAL NIGHTLY
Status: DISCONTINUED | OUTPATIENT
Start: 2023-09-08 | End: 2023-09-08 | Stop reason: HOSPADM

## 2023-09-08 RX ORDER — PREDNISONE 10 MG/1
10 TABLET ORAL EVERY MORNING
Refills: 0 | COMMUNITY
Start: 2023-09-09

## 2023-09-08 RX ORDER — PREDNISONE 5 MG/1
5 TABLET ORAL NIGHTLY
Refills: 0 | COMMUNITY
Start: 2023-09-08

## 2023-09-08 RX ORDER — PREDNISONE 10 MG/1
10 TABLET ORAL ONCE
Status: COMPLETED | OUTPATIENT
Start: 2023-09-08 | End: 2023-09-08

## 2023-09-08 RX ADMIN — SODIUM CHLORIDE, PRESERVATIVE FREE 10 ML: 5 INJECTION INTRAVENOUS at 07:59

## 2023-09-08 RX ADMIN — PANTOPRAZOLE SODIUM 40 MG: 40 INJECTION, POWDER, FOR SOLUTION INTRAVENOUS at 07:59

## 2023-09-08 RX ADMIN — ENOXAPARIN SODIUM 40 MG: 100 INJECTION SUBCUTANEOUS at 07:59

## 2023-09-08 RX ADMIN — PREDNISONE 10 MG: 10 TABLET ORAL at 15:29

## 2023-09-08 NOTE — PROGRESS NOTES
Radiology stated inspire device is not compatible with MRI machine here. Radiology stated the MRI could be done at [facility] as their machine is compatible.

## 2023-09-08 NOTE — PROGRESS NOTES
Full consult to follow. I personally reviewed images of CTA H/N. No evidence of clinically significant stenosis in the cervical carotid or vertebral segment. If concerned about intracranial findings described in CTA, consider Neuro evaluation.

## 2023-09-08 NOTE — CONSULTS
---------------------------------------------------------------------  B. Risk of Treatment (any 1)   [] Drugs/treatments that require intensive monitoring for toxicity include:    [] Change in code status:    [] Decision to escalate care:    [] Major surgery/procedure with associated risk factors:    [x] Prescription drug management  ----------------------------------------------------------------------  [x] High (any 2)  [] Moderate (any 1)    C.  Data (any 2 for high and any 1 for moderate)  [x] Discussed management of the case with: Family  [x] Imaging personally reviewed and interpreted, includes: CT head, CTA head and neck  [x] Data Review (any 3)  [x] Collateral history obtained from: Family  [x] All available Consultant notes from yesterday/today were reviewed  [x] All current labs were reviewed and interpreted for clinical significance   [] Appropriate follow-up labs were ordered    Data: reviewed   LABS:   Lab Results   Component Value Date/Time     09/08/2023 06:12 AM    K 4.4 09/08/2023 06:12 AM    K 4.7 09/07/2023 02:09 PM     09/08/2023 06:12 AM    CO2 25 09/08/2023 06:12 AM    BUN 26 09/08/2023 06:12 AM    CREATININE 0.9 09/08/2023 06:12 AM    GFRAA >60 07/25/2022 09:58 AM    GFRAA >60 11/29/2012 11:03 AM    LABGLOM >60 09/08/2023 06:12 AM    GLUCOSE 92 09/08/2023 06:12 AM    GLUCOSE 90 04/19/2022 02:03 PM    PHOS 3.5 09/08/2023 06:12 AM    MG 2.10 09/08/2023 06:12 AM    CALCIUM 8.6 09/08/2023 06:12 AM     Lab Results   Component Value Date/Time    WBC 5.8 09/08/2023 06:12 AM    RBC 4.27 09/08/2023 06:12 AM    HGB 13.1 09/08/2023 06:12 AM    HCT 40.0 09/08/2023 06:12 AM    MCV 93.6 09/08/2023 06:12 AM    RDW 13.4 09/08/2023 06:12 AM     09/08/2023 06:12 AM     Lab Results   Component Value Date    INR 0.94 09/07/2023    PROTIME 12.6 09/07/2023       Neuroimaging was independently reviewed by myself and discussed results with the patient and family  Reviewed notes from different physicians including H&P and ED notes. Reviewed lab and blood testing    Impression:     Acute transient left visual disturbance, so far unclear etiology. Could be TIA versus migraine headache. MRI brain pending to rule out new ischemic stroke  Intracranial stenosis  History of SAH  History of right cavernous ICA aneurysm  Hypertension  Hyperlipidemia      Recommendation:     MRI brain  Echocardiogram was ordered  Follow-up lipid panel, TFT, and A1c  Aspirin  Statin  Blood pressure monitor and control  PT and OT   Aspiration precautions  DVT and GI prophylaxis  Neurochecks  Telemetry  Recommend 30-day cardiac event monitor with his primary care  Stroke education and prevention was discussed with the patient and family  Will need to follow-up with ophthalmology outpatient. Thank you for referring such patient. If you have any questions regarding my consult note, please don't hesitate to call me. ZULY Preciado - JULIO    Attending Supervising Physician's Attestation Statement      The patient was seen 9/8/2023 in conjunction with the nurse practitioner with independent history, evaluation and examination. I agree with the note which has been adjusted to reflect my findings, with the addition of the following: The patient is 57 y.o.  male  who was admitted for   transient left visual disturbance for a few minutes. Degree was severe. No severe headache. Symptoms improved spontaneously. Now back to his baseline. No residual deficit. Initial imaging with CT showed no acute stroke and CTA showed intracranial stenosis. History of right ophthalmic aneurysm in the past.  Today feels back to his baseline. On examination:  No acute distress  Awake and alert x3. Fluent speech. Appears appropriate with intact recent and remote memory.   Pupil reactive and symmetric, extraocular motor intact, no ophthalmoplegia, face is symmetric and tongue is midline  No focal weakness with symmetric

## 2023-09-08 NOTE — DISCHARGE INSTRUCTIONS
Call central scheduling [phone] to schedule echocardiogram and MRI brain without contrast. MRI brain will need to be scheduled at [facility].. Your information:    Your information:  Name: Adelfo Thompson  : 1960    Your Discharge Instructions    What to do after you leave the hospital:    Read, review and familiarize yourself with the information provided below and in a separate packet on   Stroke and MRI     Diet: General    Recommended activity:   As tolerated  Avoid strenuous activity until instructed by your physician to resume; balance rest with periods of light to normal activity. If you experience any of the following: Unusual or inadequately controlled pain; unusual transient shortness of breath; recurrent or persistent nausea, heartburn, palpitations or lightheadedness; increased swelling; increased fatigue; fever >100; please follow up with @PCP@ [unfilled] or go to the Emergency Room. Home Health/ Outpatient Services: Outpatient MRI and Echo      Information obtained by:  By signing below, I understand and acknowledge receipt of the instructions indicated above, and I understand that if any problems occur once I leave the hospital I am to contact @[email or web address].

## 2023-09-08 NOTE — PROGRESS NOTES
Patient is alert and oriented x 4, pleasant and frequently walks around room with a steady gait. NIHSS 0 at this time.

## 2023-09-08 NOTE — PROGRESS NOTES
Data- discharge order received, pt verbalized agreement to discharge, disposition to previous residence, no needs for HHC/DME. Action- discharge instructions prepared and given to patient, pt verbalized understanding. Medication information packet given r/t NEW and/or CHANGED prescriptions emphasizing name/purpose/side effects, pt verbalized understanding. Discharge instruction summary: Diet- general, Activity- as tolerated, Primary Care Physician as follows: Jose Sofia, [facility]     1. WEIGHT: Admit Weight - Scale: 189 lb 4.8 oz (85.9 kg) (09/07/23 1335)        Today  Weight - Scale: 194 lb 0.1 oz (88 kg) (09/08/23 0406)       2. O2 SAT.: SpO2: 95 % (09/08/23 1115)    Response- Pt belongings gathered, IV removed. Disposition is home (no HHC/DME needs), transported with all belongings and paperwork, patient ambulated to lobby independently with family member, no complications.

## 2023-09-08 NOTE — PROGRESS NOTES
[address] [facility] Department   Phone: [phone]    Occupational Therapy    [x] Initial Evaluation            [] Daily Treatment Note         [x] Discharge Summary      Patient: Tommy Plummer   : 1960   MRN: [id number]   Date of Service:  2023    Admitting Diagnosis:  Transient visual loss of left eye  Current Admission Summary: 57 y.o. male who presents to the emergency department with a chief complaint of vision loss in his left eye that lasted for about 5 or 10 minutes. He states he bent down and when he stood back up his vision only in his left eye went purely black. He states he began getting flashes of light at the end and that his vision came back. This is happened in the past and he is seeing ophthalmology and was told that it could have been a clot that caused this but never had imaging. He does have previous history of subarachnoid hemorrhage. States he does have a mild headache now but this does not feel like the headache that he had at that time as that was the worst headache of his life. Denies difficulty moving his extremities, numbness, speech difficulty, chest pain, shortness of breath, abdominal pain or any other symptoms. He denies any symptoms now that he is here in the emergency room. 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 Past Surgical History:  has a past surgical history that includes Colonoscopy (2001); Wrist surgery (Left);  Colonoscopy

## 2023-09-08 NOTE — CARE COORDINATION
Discharge Planning:     (CM) reviewed the patient's chart to assess needs. Patient's Readmission Risk Score is 7%. Patient's medical insurance is [employer]. Patient's PCP is [facility]. No needs anticipated, at this time. CM team to follow. Staff to inform CM if additional discharge needs arise.         Electronically signed by HOWARD Lane on 9/8/2023 at 2:03 PM

## 2023-09-08 NOTE — PROGRESS NOTES
[address] [facility] Department   Phone: [phone]    Physical Therapy    [x] Initial Evaluation            [] Daily Treatment Note         [] Discharge Summary      Patient: Virginia Lei   : 1960   MRN: [id number]   Date of Service:  2023  Admitting Diagnosis: Transient visual loss of left eye  Current Admission Summary:  57 y.o. male who presents to the emergency department with a chief complaint of vision loss in his left eye that lasted for about 5 or 10 minutes. He states he bent down and when he stood back up his vision only in his left eye went purely black. He states he began getting flashes of light at the end and that his vision came back. This is happened in the past and he is seeing ophthalmology and was told that it could have been a clot that caused this but never had imaging. He does have previous history of subarachnoid hemorrhage. States he does have a mild headache now but this does not feel like the headache that he had at that time as that was the worst headache of his life. Denies difficulty moving his extremities, numbness, speech difficulty, chest pain, shortness of breath, abdominal pain or any other symptoms. He denies any symptoms now that he is here in the emergency room. 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 Past Surgical History:  has a past surgical history that includes Colonoscopy (2001); Wrist surgery (Left);  Colonoscopy (2005); discharge  Prognosis: excellent  Clinical Assessment: Patient is 56 y/o male presenting to [facility] secondary to transient visual loss of L eye. Patient currently presents at baseline function with all functional mobility. Patient is deemed safe to return to home setting once medically stable. PT will be signing off. Safety Interventions: patient left in chair, call light within reach, and nurse notified    Plan  Frequency: Eval with same day discharge. No follow up required. Current Treatment Recommendations: not applicable, evaluation completed with same day discharge. Goals  Patient Goals: Return home   Short Term Goals:  Time Frame: N/A  Patient eval with same day discharge. No goals set as patient is at baseline mobility status. Above goals reviewed on 9/8/2023. All goals are ongoing at this time unless indicated above.       Therapy Session Time      Individual Group Co-treatment   Time In     0815   Time Out     7098   Minutes     23     Timed Code Treatment Minutes:  8 Minutes  Total Treatment Minutes:  23 Minutes       Electronically Signed By: Amelie Daniels, DEBBIE Daniels PT, DPT, [id number]

## 2023-09-11 ENCOUNTER — TELEPHONE (OUTPATIENT)
Dept: INTERNAL MEDICINE CLINIC | Age: 63
End: 2023-09-11

## 2023-09-11 NOTE — TELEPHONE ENCOUNTER
----- Message from Natacha Tavarez sent at 9/11/2023  8:33 AM EDT -----  Subject: Hospital Follow Up    QUESTIONS  What [facility] the [facility]  Date of Discharge? 2023-09-08  Discharge Location? Home  Reason for hospitalization as patient stated? lost vision in left eye  What question does the patient have, if applicable?   ---------------------------------------------------------------------------  --------------  CALL BACK INFO  What is the best way for the office to contact you? OK to leave message on   voicemail  Preferred Call Back Phone Number? [phone]  ---------------------------------------------------------------------------  --------------  SCRIPT ANSWERS  Relationship to Patient?  Self

## 2023-09-11 NOTE — TELEPHONE ENCOUNTER
..Care Transitions Initial Follow Up Call    Outreach made within 2 business days of discharge: Yes    Patient: Suresh Messer Patient : 1960   MRN: [id number]  Reason for Admission: There are no discharge diagnoses documented for the most recent discharge. Discharge Date: 23       Spoke with: Mr. Blanca Srinivasan     Discharge department/facility: [facility] Interactive Patient Contact:  Was patient able to fill all prescriptions: Yes  Was patient instructed to bring all medications to the follow-up visit: Yes  Is patient taking all medications as directed in the discharge summary?  Yes  Does patient understand their discharge instructions: Yes  Does patient have questions or concerns that need addressed prior to 7-14 day follow up office visit: no    Scheduled appointment with PCP within 7-14 days    Follow Up  Future Appointments   Date Time Provider [address] [place] [address]   9/15/2023  2:30 PM Deja Lewis PTA [facility] [place]    2023  2:30 PM DO ABBIE Ambrosioci - DYADENIKE   2023  9:15 AM Justin Benson PT [facility] [place]    2023  9:45 AM DO ABBIE Chou [place] - DYD   2023  9:15 AM Kim Moscoso MD [facility]       Rudy Townsend MA

## 2023-09-15 ENCOUNTER — HOSPITAL ENCOUNTER (OUTPATIENT)
Dept: PHYSICAL THERAPY | Age: 63
Setting detail: THERAPIES SERIES
Discharge: HOME | End: 2023-09-15
Payer: COMMERCIAL

## 2023-09-15 PROCEDURE — 97530 THERAPEUTIC ACTIVITIES: CPT

## 2023-09-15 PROCEDURE — 97110 THERAPEUTIC EXERCISES: CPT

## 2023-09-15 NOTE — FLOWSHEET NOTE
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 Insurance/Certification information:  PT Insurance Information: anthem, no auth, ded met, no copay  Physician Information:  Sebastián Ricketts MD    Plan of care signed (Y/N): [x]  Yes []  No     Date of Patient follow up with Physician:      Progress Report: [x]  Yes  []  No     Date Range for reporting period:  Beginnin2023  PN: 2023  POC: ,   Ending:     Progress report due (10 Rx/or 30 days whichever is less): visit #09 or     Recertification due (POC duration/ or 90 days whichever is less): visit #28 or     Visit # Insurance Allowable Auth required? Date Range      60 []  Yes  [x]  No      Latex Allergy:  [x]NO      []YES  Preferred Language for Healthcare:   [x]English       []other:    Functional Scale:       Date assessed:  LEFS: raw score = 24; dysfunction = 70%  23  LEFS: raw score = 39; dysfunction = 51.25% 23  LEFS: raw score = 70; dysfunction = 12.5%            23  LEFS: raw score = 70; dysfunction = 12.5%                Pain level:  0/10     SUBJECTIVE:  Pt is performing all desired ADL's and recreational activities without limitations. Pt states R hip remains pain free.        OBJECTIVE: NEEDS OBJECTIVE MEASURE 1/wk  9/15: Mild limp through R stance phase of gait cycle  :     No TTP hip flex or lateral hip   30 sec STS 15  Hip AROM  R ER 30, IR 30; L ER 30,  IR 40  Strength (0-5) - Left Right   Hip Flexion - supine  4+  3+   Hip Flexion - seated  5 3+    Hip

## 2023-09-18 ENCOUNTER — OFFICE VISIT (OUTPATIENT)
Dept: INTERNAL MEDICINE CLINIC | Age: 63
End: 2023-09-18

## 2023-09-18 VITALS
RESPIRATION RATE: 14 BRPM | SYSTOLIC BLOOD PRESSURE: 110 MMHG | WEIGHT: 195 LBS | DIASTOLIC BLOOD PRESSURE: 60 MMHG | OXYGEN SATURATION: 97 % | BODY MASS INDEX: 27.3 KG/M2 | HEART RATE: 59 BPM | HEIGHT: 71 IN

## 2023-09-18 DIAGNOSIS — I67.9 CEREBROVASCULAR DISEASE: ICD-10-CM

## 2023-09-18 DIAGNOSIS — I65.21 STENOSIS OF INTRACRANIAL PORTIONS OF RIGHT INTERNAL CAROTID ARTERY: ICD-10-CM

## 2023-09-18 DIAGNOSIS — I10 BENIGN ESSENTIAL HTN: ICD-10-CM

## 2023-09-18 DIAGNOSIS — I34.0 MITRAL VALVE INSUFFICIENCY, UNSPECIFIED ETIOLOGY: ICD-10-CM

## 2023-09-18 DIAGNOSIS — I67.1 ANEURYSM OF CAVERNOUS PORTION OF RIGHT INTERNAL CAROTID ARTERY: ICD-10-CM

## 2023-09-18 DIAGNOSIS — Z09 HOSPITAL DISCHARGE FOLLOW-UP: Primary | ICD-10-CM

## 2023-09-18 DIAGNOSIS — H53.122 TRANSIENT VISUAL LOSS, LEFT EYE: ICD-10-CM

## 2023-09-18 NOTE — PROGRESS NOTES
recommended he have an MRI of the brain to rule out a new ischemic stroke. The neurologist recommended a 30-day event recorder along with aspirin and statin and blood pressure control. An echocardiogram was ordered but not obtained while hospitalized. The patient was upset that the echocardiogram and MRI studies were not able to be completed at [facility]. It is unclear as to why they were not able to be completed. He was also seen by vascular who made no recommendations for further evaluation other than possible neurosurgery consultation. Interestingly, he was seen by Dr. Yoanna malhotra in [place]. His differential diagnosis included ocular migraine, temporal arteritis, TIA. When he was back here in [place] he did undergo a carotid Doppler which showed no evidence of stenosis in the carotid or vertebral arteries. The echocardiogram was unremarkable. He is under the care of rheumatology and inflammatory markers have been negative. The patient denies any particular headache, unilateral weakness or paresthesias. He was eventually seen by his own ophthalmologist here in [facility] about a year ago. No other testing was ordered. Interval history/Current status: Since being home, he has not had any recurrent attacks of vision loss. He has been feeling well. He is nervous. He would like to continue to evaluate this problem. He is on Prednisone for inflammatory arthritis. He is seeing Dr. Kenyatta Jacques, Rheumatologist.  He is back on Prednisone. He was diagnosed with PMR, OA multiple joints. This is interesting be cause of the association with TA. His last ESR was 8/22/2023--- 10. CRP 4/2023-- <3.0    Patient did have this evaluated in 2022. Echocardiogram 8/10/2022 showed an EF of 55 to 60%, moderate mitral regurgitation, no PFO. Carotid Doppler 8/10/2022 showed no significant stenosis of the cervical internal carotid arteries.     MRA of the brain in 2019 showed a 2 mm right internal carotid artery

## 2023-09-20 ENCOUNTER — TELEPHONE (OUTPATIENT)
Dept: INTERNAL MEDICINE CLINIC | Age: 63
End: 2023-09-20

## 2023-09-20 NOTE — TELEPHONE ENCOUNTER
Sheila from [email or web address] is calling to let us know that she has been receiving a fax for this patient regarding their ED visit but unfortunately he is not their patient and would like the faxes to be stopped.  Fax number that they  have been receiving information on is :  NADYA# [phone]

## 2023-09-22 ENCOUNTER — HOSPITAL ENCOUNTER (OUTPATIENT)
Dept: PHYSICAL THERAPY | Age: 63
Setting detail: THERAPIES SERIES
Discharge: HOME | End: 2023-09-22
Payer: COMMERCIAL

## 2023-09-22 PROCEDURE — 97110 THERAPEUTIC EXERCISES: CPT

## 2023-09-22 PROCEDURE — 97530 THERAPEUTIC ACTIVITIES: CPT

## 2023-09-22 NOTE — PLAN OF CARE
balance deficit. Insurance/Certification information:  PT Insurance Information: anthem, no auth, ded met, no copay  Physician Information:  Selam Estrada MD    Plan of care signed (Y/N): [x]  Yes []  No     Date of Patient follow up with Physician:      Progress Report: [x]  Yes  []  No     Date Range for reporting period:  Beginnin2023  PN: 2023  POC: ,   Endin/22    Progress report due (10 Rx/or 30 days whichever is less): visit #63 or     Recertification due (POC duration/ or 90 days whichever is less): visit #28 or     Visit # Insurance Allowable Auth required? Date Range      60 []  Yes  [x]  No      Latex Allergy:  [x]NO      []YES  Preferred Language for Healthcare:   [x]English       []other:    Functional Scale:       Date assessed:  LEFS: raw score = 24; dysfunction = 70%  23  LEFS: raw score = 39; dysfunction = 51.25% 23  LEFS: raw score = 70; dysfunction = 12.5%            23  LEFS: raw score = 70; dysfunction = 12.5%              LEFS: raw score = 80; dysfunction = 0%                Pain level:  0/10     SUBJECTIVE:  Pt is doing well, no complaints. No hip pain since hip mobs. No restrictions.     OBJECTIVE: NEEDS OBJECTIVE MEASURE 1/wk   Hip AROM  R ER 45, IR 10; L ER 30,  IR 40  Strength (0-5) - Left Right   Hip Flexion - supine  5  5   Hip Flexion - seated  5 5   Hip Abduction  5  5   Hip ext 5 5   Hip ER  IR    5  5   Quads    5   Hamstrings    5     9/15: Mild limp through R stance phase of gait cycle  :     No TTP hip flex or lateral hip   30 sec STS 15  Hip AROM  R ER 30, IR 30; L ER 30,  IR 40  Strength (0-5) - Left Right   Hip Flexion - supine  4+  3+   Hip Flexion - seated  5 3+    Hip Abduction  5  4-   Hip ext 5 5   Hip ER  IR    5  5   Quads    5   Hamstrings    5       : No TTP to hip flexor musculature  : 30s STS: 12, SL bridge L: 30  R: 30, AROM hip flex 121  : MMT R knee ext 5/5, flex 5/5,

## 2023-10-02 DIAGNOSIS — I67.9 CEREBROVASCULAR DISEASE: Primary | ICD-10-CM

## 2023-10-02 DIAGNOSIS — E78.5 HYPERLIPIDEMIA, UNSPECIFIED HYPERLIPIDEMIA TYPE: ICD-10-CM

## 2023-10-02 RX ORDER — ATORVASTATIN CALCIUM 80 MG/1
80 TABLET, FILM COATED ORAL DAILY
Qty: 90 TABLET | Refills: 1 | Status: SHIPPED | OUTPATIENT
Start: 2023-10-02 | End: 2023-10-03 | Stop reason: SDUPTHER

## 2023-10-03 DIAGNOSIS — E78.5 HYPERLIPIDEMIA, UNSPECIFIED HYPERLIPIDEMIA TYPE: ICD-10-CM

## 2023-10-03 DIAGNOSIS — I67.9 CEREBROVASCULAR DISEASE: ICD-10-CM

## 2023-10-03 RX ORDER — ATORVASTATIN CALCIUM 80 MG/1
80 TABLET, FILM COATED ORAL DAILY
Qty: 90 TABLET | Refills: 1 | Status: SHIPPED | OUTPATIENT
Start: 2023-10-03

## 2023-10-03 NOTE — TELEPHONE ENCOUNTER
Patient is calling in regards to medication refill request that was sent by  to [facility]. Per patient medication atorvastatin (LIPITOR) 80 MG tablet  Should be sent to : [facility] ([facility]) - [address] [phone] [facility] [phone]    Please cancel the order to [facility] and resend medication to:       [facility] ([facility]) - [facility] [address] [phone] - [address]  [phone]

## 2023-10-27 ENCOUNTER — OFFICE VISIT (OUTPATIENT)
Dept: INTERNAL MEDICINE CLINIC | Age: 63
End: 2023-10-27

## 2023-10-27 VITALS
SYSTOLIC BLOOD PRESSURE: 114 MMHG | WEIGHT: 196 LBS | RESPIRATION RATE: 14 BRPM | OXYGEN SATURATION: 98 % | HEIGHT: 71 IN | DIASTOLIC BLOOD PRESSURE: 72 MMHG | BODY MASS INDEX: 27.44 KG/M2 | HEART RATE: 58 BPM

## 2023-10-27 DIAGNOSIS — I67.9 CEREBROVASCULAR DISEASE: ICD-10-CM

## 2023-10-27 DIAGNOSIS — R90.89 ABNORMAL BRAIN MRI: ICD-10-CM

## 2023-10-27 DIAGNOSIS — Z86.73 HISTORY OF TIA (TRANSIENT ISCHEMIC ATTACK): ICD-10-CM

## 2023-10-27 DIAGNOSIS — Z86.73 HISTORY OF CEREBRAL INFARCTION: ICD-10-CM

## 2023-10-27 DIAGNOSIS — H53.122 TRANSIENT VISUAL LOSS, LEFT EYE: Primary | ICD-10-CM

## 2023-10-27 DIAGNOSIS — I65.21 STENOSIS OF INTRACRANIAL PORTIONS OF RIGHT INTERNAL CAROTID ARTERY: ICD-10-CM

## 2023-10-27 RX ORDER — ASPIRIN 325 MG
325 TABLET ORAL DAILY
Qty: 30 TABLET | Refills: 3 | COMMUNITY
Start: 2023-10-27

## 2023-10-27 NOTE — PROGRESS NOTES
[facility]) Physicians  Internal Medicine  Patient Encounter  Sadia Jesus D.O., [facility]        Chief Complaint   Patient presents with    Follow-up     Monday - walking and lost some of his vision        HPI: 57 y.o. male seen again today for a short interval follow-up regarding episodes of vision loss and cerebrovascular disease. He was seen for hospital follow-up on 9/18/2023. We addressed transient visual loss of the left eye. He was referred back to comprehensive ophthalmology/neuro-ophthalmology. Regarding a right internal carotid artery cavernous aneurysm as well as stenosis of the right internal carotid artery he was referred to Dr. Mari Vazquez. We also ordered an MRI/MRA of his brain as well as an echocardiogram and cardiac event recorder. He has the Echo and Monitor scheduled. We reviewed his MRI of his brain from 9/29/2023. This revealed a tiny remote cortical infarct in the left posterior frontal lobe and a few tiny nonspecific foci in the cerebral white matter consistent with chronic small vessel ischemia. MRA on 9/29/2023 showed Slight reduction in visualization of the distal M1 segment of the LEFT cerebral artery segment   and apparent stenoses at the origins of the left M2 branches. This may be artifactual but   consideration is given to atherosclerotic changes and potentially   sequela of prior or current vasculitis. He is due to see Dr. Catrachita Hunt next week  He saw Dr. Jatin Ewing, Neuro-ophth--She thought this is a vascular problem. He had another episode this past Monday. Vision was partially lost.  Vision did not go \"black\" like the last time. He was walking while vacationing in the [place] and developed blurry vision. This lasted about 5 minutes.       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

## 2023-11-13 ENCOUNTER — OFFICE VISIT (OUTPATIENT)
Dept: ORTHOPEDIC SURGERY | Age: 63
End: 2023-11-13

## 2023-11-13 VITALS — BODY MASS INDEX: 27.44 KG/M2 | HEIGHT: 71 IN | WEIGHT: 196 LBS

## 2023-11-13 DIAGNOSIS — M25.851 FEMOROACETABULAR IMPINGEMENT OF RIGHT HIP: ICD-10-CM

## 2023-11-13 DIAGNOSIS — S73.191D TEAR OF RIGHT ACETABULAR LABRUM, SUBSEQUENT ENCOUNTER: ICD-10-CM

## 2023-11-13 DIAGNOSIS — Z98.890 STATUS POST ARTHROSCOPY OF HIP: Primary | ICD-10-CM

## 2023-11-13 NOTE — PROGRESS NOTES
[id number]  Email: [email or web address]  Office: [phone]    11/13/23  11:14 AM    The encounter with Andrew Carranza was carried out by myself, Dr Ja Sims, who personally examined the patient and reviewed the plan. This dictation was performed with a verbal recognition program (DRAGON) and it was checked for errors. It is possible that there are still dictated errors within this office note. If so, please bring any errors to my attention for an addendum. All efforts were made to ensure that this office note is accurate.

## 2024-02-01 RX ORDER — LISINOPRIL 10 MG/1
10 TABLET ORAL NIGHTLY
Qty: 90 TABLET | Refills: 0 | Status: SHIPPED | OUTPATIENT
Start: 2024-02-01

## 2024-02-01 NOTE — TELEPHONE ENCOUNTER
Last appointment: 10/27/2023  Next appointment: Visit date not found  Last refill: 3/27/2023  Sent APR message to schedule due/overdue appointment.

## 2024-02-10 DIAGNOSIS — E78.5 HYPERLIPIDEMIA, UNSPECIFIED HYPERLIPIDEMIA TYPE: ICD-10-CM

## 2024-02-10 DIAGNOSIS — I67.9 CEREBROVASCULAR DISEASE: ICD-10-CM

## 2024-02-12 RX ORDER — ATORVASTATIN CALCIUM 80 MG/1
80 TABLET, FILM COATED ORAL DAILY
Qty: 90 TABLET | Refills: 3 | Status: SHIPPED | OUTPATIENT
Start: 2024-02-12

## 2024-03-14 RX ORDER — LISINOPRIL 10 MG/1
10 TABLET ORAL NIGHTLY
Qty: 90 TABLET | Refills: 3 | Status: SHIPPED | OUTPATIENT
Start: 2024-03-14

## 2024-04-03 ASSESSMENT — PATIENT HEALTH QUESTIONNAIRE - PHQ9
2. FEELING DOWN, DEPRESSED OR HOPELESS: NOT AT ALL
1. LITTLE INTEREST OR PLEASURE IN DOING THINGS: NOT AT ALL
SUM OF ALL RESPONSES TO PHQ9 QUESTIONS 1 & 2: 0
SUM OF ALL RESPONSES TO PHQ9 QUESTIONS 1 & 2: 0
1. LITTLE INTEREST OR PLEASURE IN DOING THINGS: NOT AT ALL
2. FEELING DOWN, DEPRESSED OR HOPELESS: NOT AT ALL
SUM OF ALL RESPONSES TO PHQ QUESTIONS 1-9: 0

## 2024-04-04 ENCOUNTER — OFFICE VISIT (OUTPATIENT)
Dept: INTERNAL MEDICINE CLINIC | Age: 64
End: 2024-04-04

## 2024-04-04 VITALS
SYSTOLIC BLOOD PRESSURE: 130 MMHG | TEMPERATURE: 98.1 F | OXYGEN SATURATION: 97 % | WEIGHT: 196 LBS | DIASTOLIC BLOOD PRESSURE: 66 MMHG | BODY MASS INDEX: 27.34 KG/M2 | HEART RATE: 59 BPM

## 2024-04-04 DIAGNOSIS — I65.21 STENOSIS OF INTRACRANIAL PORTIONS OF RIGHT INTERNAL CAROTID ARTERY: ICD-10-CM

## 2024-04-04 DIAGNOSIS — Z01.84 IMMUNITY STATUS TESTING: ICD-10-CM

## 2024-04-04 DIAGNOSIS — R73.01 IMPAIRED FASTING GLUCOSE: ICD-10-CM

## 2024-04-04 DIAGNOSIS — H53.122 TRANSIENT VISUAL LOSS, LEFT EYE: Primary | ICD-10-CM

## 2024-04-04 DIAGNOSIS — Z86.73 HISTORY OF STROKE: ICD-10-CM

## 2024-04-04 DIAGNOSIS — I10 BENIGN ESSENTIAL HTN: ICD-10-CM

## 2024-04-04 DIAGNOSIS — Z12.5 SCREENING FOR PROSTATE CANCER: ICD-10-CM

## 2024-04-04 DIAGNOSIS — Z23 NEED FOR PNEUMOCOCCAL VACCINATION: ICD-10-CM

## 2024-04-04 DIAGNOSIS — E78.5 HYPERLIPIDEMIA, UNSPECIFIED HYPERLIPIDEMIA TYPE: ICD-10-CM

## 2024-04-04 DIAGNOSIS — I67.1 ANEURYSM OF CAVERNOUS PORTION OF RIGHT INTERNAL CAROTID ARTERY: ICD-10-CM

## 2024-04-04 DIAGNOSIS — I67.9 CEREBROVASCULAR DISEASE: ICD-10-CM

## 2024-04-04 DIAGNOSIS — Z86.73 HISTORY OF TIA (TRANSIENT ISCHEMIC ATTACK): ICD-10-CM

## 2024-04-04 DIAGNOSIS — M35.3 PMR (POLYMYALGIA RHEUMATICA) (HCC): ICD-10-CM

## 2024-04-04 DIAGNOSIS — I87.2 CHRONIC VENOUS INSUFFICIENCY: ICD-10-CM

## 2024-04-04 DIAGNOSIS — I83.893 VARICOSE VEINS OF BOTH LEGS WITH EDEMA: ICD-10-CM

## 2024-04-04 RX ORDER — PREDNISONE 1 MG/1
4 TABLET ORAL DAILY
COMMUNITY

## 2024-04-04 SDOH — ECONOMIC STABILITY: INCOME INSECURITY: HOW HARD IS IT FOR YOU TO PAY FOR THE VERY BASICS LIKE FOOD, HOUSING, MEDICAL CARE, AND HEATING?: NOT HARD AT ALL

## 2024-04-04 SDOH — ECONOMIC STABILITY: FOOD INSECURITY: WITHIN THE PAST 12 MONTHS, THE FOOD YOU BOUGHT JUST DIDN'T LAST AND YOU DIDN'T HAVE MONEY TO GET MORE.: NEVER TRUE

## 2024-04-04 SDOH — ECONOMIC STABILITY: FOOD INSECURITY: WITHIN THE PAST 12 MONTHS, YOU WORRIED THAT YOUR FOOD WOULD RUN OUT BEFORE YOU GOT MONEY TO BUY MORE.: NEVER TRUE

## 2024-04-04 NOTE — PROGRESS NOTES
[facility]  Internal Medicine  Patient Encounter  Kane Loco D.O., [facility]        Chief Complaint   Patient presents with    Check-Up     Patient rtc for 6 mo check up. Patient has been in [place] x last 4 months- while there he  lost vision in L eye 7 x. Next f/u w/ Dr. Vazquez May '24.        HPI: 63 y.o. male seen today requesting his routine checkup regarding the status of current chronic medical problems listed below along with a medication review and reconciliation.  He just got back from [place].        Meño has multiple medical problems including but not limited to hypertension, hyperlipidemia, impaired fasting glucose, ruptured cerebral aneurysm, hypertensive emergency, venous insufficiency, NEELIMA, impaired fasting glucose, mild to moderate mitral regurgitation    He is now seeing Rheumatology for joint pain--Shoulders, hips, knees.  He was diagnosed with PMR.  He was placed on steroids which eliminate the pain.  He is down to 4 mg daily.  He continues to wean.  He feels like a different man.  He has no pain.  Was last seen by Dr. Zaidi on 4/3/2024.  Her assessment was that of primary osteoarthritis involving multiple joints, PMR and chronic steroids.  DEXA scan was noted to be normal.  The fact that he was diagnosed with PMR raises some concern about vasculitis.    Also, he has additional complaints of Venous insufficiency. He has varicose veins.  He has H/O RF ablation of the Right SFJ and calf  6/22/2015.   Pt would like a different referral.  He reports swelling that worsens as the day progresses and improves by morning.  He denies itching or leg heaviness.      Also, he has additional complaints of vision loss in the left eye.  Pt states he has lost 60-80% vision loss when he has an episode.  He has talked to Dr. Vazquez.  He had an MRA brain, He had a CTA head 9/7/2023 showing severe stenosis of the right supraclinoid internal carotid artery and moderate stenosis of the V4 segment of

## 2024-04-05 DIAGNOSIS — R73.01 IMPAIRED FASTING GLUCOSE: ICD-10-CM

## 2024-04-05 DIAGNOSIS — E78.5 HYPERLIPIDEMIA, UNSPECIFIED HYPERLIPIDEMIA TYPE: ICD-10-CM

## 2024-04-05 DIAGNOSIS — Z12.5 SCREENING FOR PROSTATE CANCER: ICD-10-CM

## 2024-04-05 DIAGNOSIS — I10 BENIGN ESSENTIAL HTN: ICD-10-CM

## 2024-04-05 DIAGNOSIS — Z01.84 IMMUNITY STATUS TESTING: ICD-10-CM

## 2024-04-05 LAB
BASOPHILS # BLD: 0.1 K/UL (ref 0–0.2)
BASOPHILS NFR BLD: 1.3 %
DEPRECATED RDW RBC AUTO: 12.5 % (ref 12.4–15.4)
EOSINOPHIL # BLD: 0.2 K/UL (ref 0–0.6)
EOSINOPHIL NFR BLD: 4 %
HBV SURFACE AB SERPL IA-ACNC: <3.5 MIU/ML
HCT VFR BLD AUTO: 42.2 % (ref 40.5–52.5)
HGB BLD-MCNC: 13.9 G/DL (ref 13.5–17.5)
LYMPHOCYTES # BLD: 1.7 K/UL (ref 1–5.1)
LYMPHOCYTES NFR BLD: 29.9 %
MCH RBC QN AUTO: 31.7 PG (ref 26–34)
MCHC RBC AUTO-ENTMCNC: 32.9 G/DL (ref 31–36)
MCV RBC AUTO: 96.2 FL (ref 80–100)
MONOCYTES # BLD: 0.5 K/UL (ref 0–1.3)
MONOCYTES NFR BLD: 9.1 %
NEUTROPHILS # BLD: 3.2 K/UL (ref 1.7–7.7)
NEUTROPHILS NFR BLD: 55.7 %
PLATELET # BLD AUTO: 295 K/UL (ref 135–450)
PMV BLD AUTO: 7.8 FL (ref 5–10.5)
RBC # BLD AUTO: 4.39 M/UL (ref 4.2–5.9)
WBC # BLD AUTO: 5.7 K/UL (ref 4–11)

## 2024-04-06 LAB
ALBUMIN SERPL-MCNC: 4.5 G/DL (ref 3.4–5)
ALBUMIN/GLOB SERPL: 2.3 {RATIO} (ref 1.1–2.2)
ALP SERPL-CCNC: 45 U/L (ref 40–129)
ALT SERPL-CCNC: 18 U/L (ref 10–40)
ANION GAP SERPL CALCULATED.3IONS-SCNC: 11 MMOL/L (ref 3–16)
AST SERPL-CCNC: 18 U/L (ref 15–37)
BILIRUB SERPL-MCNC: 0.3 MG/DL (ref 0–1)
BUN SERPL-MCNC: 22 MG/DL (ref 7–20)
CALCIUM SERPL-MCNC: 9.4 MG/DL (ref 8.3–10.6)
CHLORIDE SERPL-SCNC: 102 MMOL/L (ref 99–110)
CHOLEST SERPL-MCNC: 155 MG/DL (ref 0–199)
CO2 SERPL-SCNC: 28 MMOL/L (ref 21–32)
CREAT SERPL-MCNC: 0.9 MG/DL (ref 0.8–1.3)
EST. AVERAGE GLUCOSE BLD GHB EST-MCNC: 122.6 MG/DL
GFR SERPLBLD CREATININE-BSD FMLA CKD-EPI: >90 ML/MIN/{1.73_M2}
GLUCOSE SERPL-MCNC: 89 MG/DL (ref 70–99)
HBA1C MFR BLD: 5.9 %
HDLC SERPL-MCNC: 64 MG/DL (ref 40–60)
LDLC SERPL CALC-MCNC: 76 MG/DL
POTASSIUM SERPL-SCNC: 4.5 MMOL/L (ref 3.5–5.1)
PROT SERPL-MCNC: 6.5 G/DL (ref 6.4–8.2)
PSA SERPL DL<=0.01 NG/ML-MCNC: 1.72 NG/ML (ref 0–4)
SODIUM SERPL-SCNC: 141 MMOL/L (ref 136–145)
TRIGL SERPL-MCNC: 77 MG/DL (ref 0–150)
TSH SERPL DL<=0.005 MIU/L-ACNC: 1.78 UIU/ML (ref 0.27–4.2)
VLDLC SERPL CALC-MCNC: 15 MG/DL

## 2024-05-03 NOTE — PROGRESS NOTES
Interventional Cardiology Consultation     Yony Elizabeth  1960    PCP: Kane Loco DO  Referring Physician: Kane Loco DO  Reason for Referral: venous insufficiency   Chief Complaint: \"I have varicose veins\"  Chief Complaint   Patient presents with    New Patient     Pt concerned with veins in both legs. Associated with some swelling.        Subjective:   History of Present Illness: The patient is 63 y.o. male with a past medical history significant for carotid stenosis, HTN, HLD, mitral regurgitation, and NEELIMA. Patient presents today for further management of venous insufficiency. Reports chronic varicose veins with associated pain and mild edema.           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 08/25/2020    Carotid atherosclerosis     Cerebral aneurysm rupture (HCC) 10/01/2017    Colon polyps     Dilated aortic root (HCC)     HTN (hypertension)     Hyperlipidemia     Impaired fasting glucose     Mitral regurgitation     Mitral valve insufficiency     Psoriasis     RBBB (right bundle branch block)     Severe sleep apnea 11/11/2020    has inspire    Subarachnoid hematoma (HCC) 10/01/2017    Varicose veins     Venous insufficiency     Venous reflux     Saphenofemoral     Past Surgical History:   Procedure Laterality Date    COLONOSCOPY  01/01/2001    COLONOSCOPY  01/01/2005    COLONOSCOPY  03/01/2010    Dr. Mitchell    HIP SURGERY Right 5/16/2023    RIGHT HIP DIAGNOSTIC ARTHROSCOPY, LABRAL REPAIR, FEMOROACETABULAR IMPINGEMENT DECOMPRESSION -BLOCK(PENG); LOCAL (ORTHOMIX) performed by Santiago Saez MD at [facility] OR    ROTATOR CUFF REPAIR Right 01/08/2013    Dr. Correa    SHOULDER ARTHROSCOPY Left 03/19/2019    LEFT SHOULDER ARTHROSCOPE WITH DEBRIDEMENT, SUBACROMIAL DECOMPRESSION, DISTAL CLAVICLE EXCISION, CALCIFIC TENDONITIS DEBRIDEMENT WITH ROTATOR CUFF AUGMENTATION performed by Valeriano Correa DO at [facility] OR

## 2024-05-06 ENCOUNTER — OFFICE VISIT (OUTPATIENT)
Dept: CARDIOLOGY CLINIC | Age: 64
End: 2024-05-06
Payer: MEDICARE

## 2024-05-06 VITALS
SYSTOLIC BLOOD PRESSURE: 118 MMHG | HEIGHT: 71 IN | WEIGHT: 195.6 LBS | HEART RATE: 54 BPM | DIASTOLIC BLOOD PRESSURE: 70 MMHG | BODY MASS INDEX: 27.38 KG/M2 | OXYGEN SATURATION: 94 %

## 2024-05-06 DIAGNOSIS — I83.893 VARICOSE VEINS OF BILATERAL LOWER EXTREMITIES WITH OTHER COMPLICATIONS: ICD-10-CM

## 2024-05-06 DIAGNOSIS — I87.2 CHRONIC VENOUS INSUFFICIENCY: Primary | ICD-10-CM

## 2024-05-06 PROCEDURE — G8427 DOCREV CUR MEDS BY ELIG CLIN: HCPCS | Performed by: INTERNAL MEDICINE

## 2024-05-06 PROCEDURE — 3017F COLORECTAL CA SCREEN DOC REV: CPT | Performed by: INTERNAL MEDICINE

## 2024-05-06 PROCEDURE — G8419 CALC BMI OUT NRM PARAM NOF/U: HCPCS | Performed by: INTERNAL MEDICINE

## 2024-05-06 PROCEDURE — 3078F DIAST BP <80 MM HG: CPT | Performed by: INTERNAL MEDICINE

## 2024-05-06 PROCEDURE — 3074F SYST BP LT 130 MM HG: CPT | Performed by: INTERNAL MEDICINE

## 2024-05-06 PROCEDURE — 99204 OFFICE O/P NEW MOD 45 MIN: CPT | Performed by: INTERNAL MEDICINE

## 2024-05-06 PROCEDURE — 1123F ACP DISCUSS/DSCN MKR DOCD: CPT | Performed by: INTERNAL MEDICINE

## 2024-05-06 PROCEDURE — 1036F TOBACCO NON-USER: CPT | Performed by: INTERNAL MEDICINE

## 2024-05-07 ENCOUNTER — HOSPITAL ENCOUNTER (OUTPATIENT)
Dept: VASCULAR LAB | Age: 64
Discharge: HOME | End: 2024-05-09
Attending: INTERNAL MEDICINE
Payer: MEDICARE

## 2024-05-07 DIAGNOSIS — I87.2 CHRONIC VENOUS INSUFFICIENCY: ICD-10-CM

## 2024-05-07 DIAGNOSIS — I83.893 VARICOSE VEINS OF BILATERAL LOWER EXTREMITIES WITH OTHER COMPLICATIONS: ICD-10-CM

## 2024-05-07 LAB
VAS LEFT CFV RFX: 0.9 S
VAS LEFT GSV AT KNEE DIAM: 2.9 MM
VAS LEFT GSV BK DIST DIAM: 4.4 MM
VAS LEFT GSV BK DIST RFX: 5.3 S
VAS LEFT GSV BK MID DIAM: 1.6 MM
VAS LEFT GSV BK MID RFX: 4.1 S
VAS LEFT GSV BK PROX DIAM: 1.4 MM
VAS LEFT GSV JUNC DIAM: 4 MM
VAS LEFT GSV THIGH DIST DIAM: 2.3 MM
VAS LEFT GSV THIGH MID DIAM: 1.8 MM
VAS LEFT GSV THIGH PROX DIAM: 2.1 MM
VAS LEFT GSV THIGH PROX RFX: 0.4 S
VAS LEFT PERFORATOR 2 DIAM: 2.3 MM
VAS LEFT PERFORATOR DIAM: 1.6 MM
VAS LEFT SSV DIST DIAM: 2.3 MM
VAS LEFT SSV DIST RFX: 0.5 S
VAS LEFT SSV JUNCTION DIAM: 1.7 MM
VAS LEFT SSV MID DIAM: 1.7 MM
VAS LEFT SSV PROX DIAM: 2.7 MM
VAS RIGHT CFV RFX: 0.8 S
VAS RIGHT GSV AK RFX: 6.3 S
VAS RIGHT GSV ANKLE DIAM: 2.3 MM
VAS RIGHT GSV ANKLE RFX: 0.6 S
VAS RIGHT GSV AT KNEE DIAM: 3.1 MM
VAS RIGHT GSV BK MID DIAM: 3.6 MM
VAS RIGHT GSV BK MID RFX: 4.1 S
VAS RIGHT GSV BK PROX DIAM: 2.2 MM
VAS RIGHT GSV BK PROX RFX: 5.2 S
VAS RIGHT GSV THIGH DIST DIAM: 4.3 MM
VAS RIGHT GSV THIGH DIST RFX: 6.5 S
VAS RIGHT PERFORATOR 2 DIAM: 2 MM
VAS RIGHT PERFORATOR DIAM: 2.5 MM
VAS RIGHT SSV DIST DIAM: 2.5 MM
VAS RIGHT SSV JUNCTION DIAM: 1.6 MM
VAS RIGHT SSV MID DIAM: 2.2 MM
VAS RIGHT SSV PROX DIAM: 1.4 MM

## 2024-05-07 PROCEDURE — 93970 EXTREMITY STUDY: CPT | Performed by: SURGERY

## 2024-05-07 PROCEDURE — 93970 EXTREMITY STUDY: CPT

## 2024-05-13 ENCOUNTER — OFFICE VISIT (OUTPATIENT)
Dept: ORTHOPEDIC SURGERY | Age: 64
End: 2024-05-13
Payer: MEDICARE

## 2024-05-13 ENCOUNTER — TELEPHONE (OUTPATIENT)
Dept: CARDIOLOGY CLINIC | Age: 64
End: 2024-05-13

## 2024-05-13 VITALS — WEIGHT: 195 LBS | HEIGHT: 71 IN | BODY MASS INDEX: 27.3 KG/M2

## 2024-05-13 DIAGNOSIS — M25.851 FEMOROACETABULAR IMPINGEMENT OF RIGHT HIP: ICD-10-CM

## 2024-05-13 DIAGNOSIS — Z98.890 STATUS POST ARTHROSCOPY OF HIP: Primary | ICD-10-CM

## 2024-05-13 DIAGNOSIS — S73.191D TEAR OF RIGHT ACETABULAR LABRUM, SUBSEQUENT ENCOUNTER: ICD-10-CM

## 2024-05-13 PROCEDURE — G8427 DOCREV CUR MEDS BY ELIG CLIN: HCPCS | Performed by: ORTHOPAEDIC SURGERY

## 2024-05-13 PROCEDURE — 1036F TOBACCO NON-USER: CPT | Performed by: ORTHOPAEDIC SURGERY

## 2024-05-13 PROCEDURE — 99213 OFFICE O/P EST LOW 20 MIN: CPT | Performed by: ORTHOPAEDIC SURGERY

## 2024-05-13 PROCEDURE — 3017F COLORECTAL CA SCREEN DOC REV: CPT | Performed by: ORTHOPAEDIC SURGERY

## 2024-05-13 PROCEDURE — 1123F ACP DISCUSS/DSCN MKR DOCD: CPT | Performed by: ORTHOPAEDIC SURGERY

## 2024-05-13 PROCEDURE — G8419 CALC BMI OUT NRM PARAM NOF/U: HCPCS | Performed by: ORTHOPAEDIC SURGERY

## 2024-05-13 NOTE — TELEPHONE ENCOUNTER
Once results reviewed and finalized by Dr. Gibson, we will call him with results and any new recommendations if applicable.

## 2024-05-13 NOTE — TELEPHONE ENCOUNTER
Romario wanted to check on results of doppler done 5/7. He would like a call with results.     Callback: [phone]

## 2024-05-13 NOTE — PROGRESS NOTES
Chief Complaint  Hip Pain (1 year post op right hip)      History of Present Illness:  Yony Elizabeth is a pleasant 63 y.o. male presenting 1 year status post right hip arthroscopy with labral reconstruction.  He is doing well with no hip complaints. He was able to spend his winter in [place], riding bikes and fishing with no discomfort. Subjective hip score of 95%    Medical History:  Patient's medications, allergies, past medical, surgical, social and family histories were reviewed and updated as appropriate.    Pain Assessment  Location of Pain: Hip  Location Modifiers: Right  Severity of Pain: 0  Aggravating Factors:  (end of day)  Limiting Behavior: No  Relieving Factors: Rest  Result of Injury: Yes  ROS: Review of systems reviewed from Patient History Form completed today and available in the patient's chart under the Media tab.      Pertinent items are noted in HPI  Review of systems reviewed from Patient History Form completed today and available in the patient's chart under the Media tab.       Vital Signs:  Ht 1.803 m (5' 11\")   Wt 88.5 kg (195 lb)   BMI 27.20 kg/m²         Neuro: Alert & oriented x 3,  normal,  no focal deficits noted. Normal affect.  Eyes: sclera clear  Ears: Normal external ear  Mouth:  No perioral lesions  Pulm: Respirations unlabored and regular  Pulse: Extremities well perfused. 2+ peripheral pulses.  Skin: Warm. No ulcerations.      Constitutional: The physical examination finds the patient to be well-developed and well-nourished.  The patient is alert and oriented x3 and was cooperative throughout the visit.      Hip Examination: right    Skin/Inspection: Incisions full healed. No skin lesions, cellulitis, or extreme edema in the lower extremities.     Standing/Walking: normal gait, negative Trendelenburg sign.     Supine Exam: Non tender around the ASIS, AIIS  Flexion arc 0 to 110 deg, IR 15 deg, ER 40 deg slightly diminished range of motion  Special Tests:  negative Deep

## 2024-05-16 ENCOUNTER — TELEPHONE (OUTPATIENT)
Dept: CARDIOLOGY CLINIC | Age: 64
End: 2024-05-16

## 2024-05-17 NOTE — TELEPHONE ENCOUNTER
Date of Procedure: Wednesday 8/21/24 @ [place] Office     Time of arrival: 1:45 pm     Procedure time: 2:00 pm     Called and spoke to Romario and he is agreeable to date and time. I emailed Romario all his RF Ablation information/instructions. Encouraged to call with any questions or concerns.

## 2024-05-29 SDOH — ECONOMIC STABILITY: FOOD INSECURITY: WITHIN THE PAST 12 MONTHS, THE FOOD YOU BOUGHT JUST DIDN'T LAST AND YOU DIDN'T HAVE MONEY TO GET MORE.: PATIENT DECLINED

## 2024-05-29 SDOH — ECONOMIC STABILITY: FOOD INSECURITY: WITHIN THE PAST 12 MONTHS, YOU WORRIED THAT YOUR FOOD WOULD RUN OUT BEFORE YOU GOT MONEY TO BUY MORE.: PATIENT DECLINED

## 2024-05-29 SDOH — ECONOMIC STABILITY: INCOME INSECURITY: HOW HARD IS IT FOR YOU TO PAY FOR THE VERY BASICS LIKE FOOD, HOUSING, MEDICAL CARE, AND HEATING?: PATIENT DECLINED

## 2024-05-29 SDOH — ECONOMIC STABILITY: HOUSING INSECURITY
IN THE LAST 12 MONTHS, WAS THERE A TIME WHEN YOU DID NOT HAVE A STEADY PLACE TO SLEEP OR SLEPT IN A SHELTER (INCLUDING NOW)?: PATIENT DECLINED

## 2024-05-29 SDOH — ECONOMIC STABILITY: TRANSPORTATION INSECURITY
IN THE PAST 12 MONTHS, HAS LACK OF TRANSPORTATION KEPT YOU FROM MEETINGS, WORK, OR FROM GETTING THINGS NEEDED FOR DAILY LIVING?: PATIENT DECLINED

## 2024-05-30 ENCOUNTER — OFFICE VISIT (OUTPATIENT)
Dept: INTERNAL MEDICINE CLINIC | Age: 64
End: 2024-05-30
Payer: MEDICARE

## 2024-05-30 VITALS
BODY MASS INDEX: 27.16 KG/M2 | HEART RATE: 54 BPM | WEIGHT: 194 LBS | TEMPERATURE: 97.9 F | SYSTOLIC BLOOD PRESSURE: 128 MMHG | OXYGEN SATURATION: 97 % | DIASTOLIC BLOOD PRESSURE: 68 MMHG | HEIGHT: 71 IN

## 2024-05-30 DIAGNOSIS — M67.921 BICEPS TENDINOPATHY, RIGHT: ICD-10-CM

## 2024-05-30 DIAGNOSIS — S46.911A STRAIN OF RIGHT ELBOW, INITIAL ENCOUNTER: Primary | ICD-10-CM

## 2024-05-30 PROCEDURE — 1123F ACP DISCUSS/DSCN MKR DOCD: CPT | Performed by: INTERNAL MEDICINE

## 2024-05-30 PROCEDURE — 3078F DIAST BP <80 MM HG: CPT | Performed by: INTERNAL MEDICINE

## 2024-05-30 PROCEDURE — 3017F COLORECTAL CA SCREEN DOC REV: CPT | Performed by: INTERNAL MEDICINE

## 2024-05-30 PROCEDURE — G8419 CALC BMI OUT NRM PARAM NOF/U: HCPCS | Performed by: INTERNAL MEDICINE

## 2024-05-30 PROCEDURE — 1036F TOBACCO NON-USER: CPT | Performed by: INTERNAL MEDICINE

## 2024-05-30 PROCEDURE — G8427 DOCREV CUR MEDS BY ELIG CLIN: HCPCS | Performed by: INTERNAL MEDICINE

## 2024-05-30 PROCEDURE — G2211 COMPLEX E/M VISIT ADD ON: HCPCS | Performed by: INTERNAL MEDICINE

## 2024-05-30 PROCEDURE — 99213 OFFICE O/P EST LOW 20 MIN: CPT | Performed by: INTERNAL MEDICINE

## 2024-05-30 PROCEDURE — 3074F SYST BP LT 130 MM HG: CPT | Performed by: INTERNAL MEDICINE

## 2024-05-30 NOTE — PROGRESS NOTES
[facility]  Internal Medicine  Patient Encounter  Kane Loco D.O., [facility]        Chief Complaint   Patient presents with    Arm Pain     Wednesday last week- Was winching his boat, was cranking with his right arm and once it started to get tight and he started to notice the pain, felt like he was stabbed in the elbow. Was trying to avoid having to use his left hand and be in the water while cranking, but once the pain started he had to stop using his right and use his left.        HPI: 63 y.o. male seen urgently today with acute right elbow pain.  Patient states that last Wednesday (8 days ago) he was winching his boat.  He was utilizing his right upper extremity in a repetitive motion.  He felt a sudden, sharp, stabbing pain.  He describes a \"snap.\"  He was able to move the arm but the pain was severe.  He had increased pain with supination of the forearm.  He locates the pain in the antecubital region.  He denies any pain on the lateral medial portion of the elbow.  He has not noticed any swelling or bruising.  He has good strength.    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 Authorizing Provider   lisinopril (PRINIVIL;ZESTRIL) 10 MG tablet TAKE 1 TABLET BY MOUTH EVERY  NIGHT Yes Kane Loco,    atorvastatin (LIPITOR) 80 MG tablet TAKE 1 TABLET BY MOUTH ONCE  DAILY Yes Kane Loco

## 2024-06-12 ENCOUNTER — OFFICE VISIT (OUTPATIENT)
Dept: ORTHOPEDIC SURGERY | Age: 64
End: 2024-06-12
Payer: MEDICARE

## 2024-06-12 VITALS — BODY MASS INDEX: 27.16 KG/M2 | WEIGHT: 194 LBS | RESPIRATION RATE: 16 BRPM | HEIGHT: 71 IN

## 2024-06-12 DIAGNOSIS — M65.30 TRIGGER FINGER, ACQUIRED: Primary | ICD-10-CM

## 2024-06-12 PROCEDURE — G8427 DOCREV CUR MEDS BY ELIG CLIN: HCPCS | Performed by: PHYSICIAN ASSISTANT

## 2024-06-12 PROCEDURE — 1123F ACP DISCUSS/DSCN MKR DOCD: CPT | Performed by: PHYSICIAN ASSISTANT

## 2024-06-12 PROCEDURE — G8419 CALC BMI OUT NRM PARAM NOF/U: HCPCS | Performed by: PHYSICIAN ASSISTANT

## 2024-06-12 PROCEDURE — 1036F TOBACCO NON-USER: CPT | Performed by: PHYSICIAN ASSISTANT

## 2024-06-12 PROCEDURE — 99213 OFFICE O/P EST LOW 20 MIN: CPT | Performed by: PHYSICIAN ASSISTANT

## 2024-06-12 PROCEDURE — 20550 NJX 1 TENDON SHEATH/LIGAMENT: CPT | Performed by: PHYSICIAN ASSISTANT

## 2024-06-12 PROCEDURE — 3017F COLORECTAL CA SCREEN DOC REV: CPT | Performed by: PHYSICIAN ASSISTANT

## 2024-06-12 RX ORDER — TRIAMCINOLONE ACETONIDE 40 MG/ML
20 INJECTION, SUSPENSION INTRA-ARTICULAR; INTRAMUSCULAR ONCE
Status: COMPLETED | OUTPATIENT
Start: 2024-06-12 | End: 2024-06-12

## 2024-06-12 RX ADMIN — TRIAMCINOLONE ACETONIDE 20 MG: 40 INJECTION, SUSPENSION INTRA-ARTICULAR; INTRAMUSCULAR at 10:57

## 2024-06-12 NOTE — PROGRESS NOTES
Mr. Yony Elizabeth returns today in follow-up of his previously treated  bilateral Middle Finger stenosing tenosynovitis.  He was last seen by Dr. Mykel Gerard in May, 2023 at which time he was treated with Steroid injection to the Bilateral, Middle Finger, Flexor Tendon Sheath.  He experienced no relief of his initial symptoms.  He  has noticed symptom worsening over the last several months.  He returns today with worsened symptoms of bilateral Middle Finger pain and swelling, requesting further treatment.  Due to the dynamic and progressive nature of Stenosing Tenosynovitis, It is clinically necessary to carefully re-evaluate Mr. Yony Elizabeth today, prior to proceeding with any further treatment or intervention, to assure the clinical appropriateness of any previously considered treatment, at this time.      I have today reviewed with Yony Elizabeth the clinically relevant, past medical history, medications, allergies,  family history, social history, and Review Of Systems & I have documented any details relevant to today's presenting complaints in my history above.  Mr. Yony Elizabeth's self-reported past medical history, medications, allergies,  family history, social history, and Review Of Systems have been scanned into the chart under the \"Media\" tab.    Physical Exam:     Mr. Yony Elizabeth appears well, he is in no apparent distress, he demonstrates appropriate mood & affect.      Skin: Normal in appearance, Normal Color, and Free of Lesions Bilaterally   Digital range of motion is without significant limitation bilaterally.  Inducible triggering is noted upon flexion in the bilateral Middle Finger.  There is not an associated flexion contracture of the PIP joint.  No other digit demonstrates evidence for stenosing tenosynovitis bilaterally.  Wrist range of motion is Full bilaterally  There is no evidence of gross joint instability bilaterally.  Sensation is subjectively normal

## 2024-06-12 NOTE — PROGRESS NOTES
Administrations This Visit       triamcinolone acetonide (KENALOG-40) injection 20 mg       Admin Date  06/12/2024  10:57 Action  Given Dose  20 mg Route  Intra-artICUlar Site  Finger (See Comments) Administered By  Ladonna Ferris MA    Ordering Provider: Carmen Clemente PA-C    NDC: 1780-0463-78    Lot#: [id number]    : [employer] (PRIMARY CARE)    Patient Supplied?: No    Comments: Right Middle      Admin Date  06/12/2024  10:57 Action  Given Dose  20 mg Route  Intra-artICUlar Site  Finger (See Comments) Administered By  Ladonna Ferris MA    Ordering Provider: Carmen Clemente PA-C    NDC: 9991-0400-30    Lot#: [id number]    : [employer] (PRIMARY CARE)    Patient Supplied?: No    Comments: Left Middle

## 2024-08-06 ENCOUNTER — TELEPHONE (OUTPATIENT)
Dept: ORTHOPEDIC SURGERY | Age: 64
End: 2024-08-06

## 2024-08-06 NOTE — TELEPHONE ENCOUNTER
General Question     Subject: Patient is calling about his appt on 08/12/24 he stated the body part that he is coming in for is his Buttock on both sides he stated it coming from his Arthritis. Please Advise.  Patient David Elizabethshon JEONG \"Romario\"   Contact Number: [phone]

## 2024-08-06 NOTE — TELEPHONE ENCOUNTER
No need to call patient.  Issues can be discussed at follow up appointment scheduled for 8/12/24.

## 2024-08-12 ENCOUNTER — OFFICE VISIT (OUTPATIENT)
Dept: ORTHOPEDIC SURGERY | Age: 64
End: 2024-08-12
Payer: MEDICARE

## 2024-08-12 VITALS — BODY MASS INDEX: 26.6 KG/M2 | HEIGHT: 71 IN | WEIGHT: 190 LBS

## 2024-08-12 DIAGNOSIS — Z98.890 STATUS POST ARTHROSCOPY OF HIP: Primary | ICD-10-CM

## 2024-08-12 DIAGNOSIS — M25.851 FEMOROACETABULAR IMPINGEMENT OF RIGHT HIP: ICD-10-CM

## 2024-08-12 DIAGNOSIS — S73.191D TEAR OF RIGHT ACETABULAR LABRUM, SUBSEQUENT ENCOUNTER: ICD-10-CM

## 2024-08-12 PROCEDURE — 1036F TOBACCO NON-USER: CPT | Performed by: ORTHOPAEDIC SURGERY

## 2024-08-12 PROCEDURE — 3017F COLORECTAL CA SCREEN DOC REV: CPT | Performed by: ORTHOPAEDIC SURGERY

## 2024-08-12 PROCEDURE — 1123F ACP DISCUSS/DSCN MKR DOCD: CPT | Performed by: ORTHOPAEDIC SURGERY

## 2024-08-12 PROCEDURE — G8419 CALC BMI OUT NRM PARAM NOF/U: HCPCS | Performed by: ORTHOPAEDIC SURGERY

## 2024-08-12 PROCEDURE — 99213 OFFICE O/P EST LOW 20 MIN: CPT | Performed by: ORTHOPAEDIC SURGERY

## 2024-08-12 PROCEDURE — G8427 DOCREV CUR MEDS BY ELIG CLIN: HCPCS | Performed by: ORTHOPAEDIC SURGERY

## 2024-08-12 NOTE — PROGRESS NOTES
Date:  2024    Name:  Yony Elizabeth  Address:  [address] [place] [address]    :  1960      Age:   63 y.o.    SSN:  [id number]      Medical Record Number:  [id number]    Reason for Visit:    Chief Complaint    Hip Pain (Right hip)      DOS:2024     HPI: Yony Elizabeth is a 63 y.o. male here today for  evaluation of bilateral buttock pain that has been on going for 2-3 month(s) after being weaned off of Prednisone by his Rheumatologist Dr Zaidi.  Patient has a history of PMR and has been on prednisone with good response in the past, but was weaned off to 3 months ago.  His bilateral buttock pain and sitting intolerance returned at that time and he is since began prednisone again for the last week.  He states that his pain has improved significantly since going back on prednisone.  Regarding his right hip surgery in , he states that he feels great and has no complaints..  He is over a year post right hip arthroscopy labral reconstruction and impingement decompression.  He sought treatment today just to make sure nothing or help wrong happened with the surgery.  Pain assessment is documented below.     The patient denies any bowel/bladder symptoms, or any numbness, tingling, or weakness down the affected thigh or leg.       Pain Assessment  Location of Pain: Hip  Location Modifiers: Right  Severity of Pain: 2  Quality of Pain: Dull  Frequency of Pain: Constant  Aggravating Factors: Other (Comment) (sitting)  Limiting Behavior: Yes  Relieving Factors: Other (Comment) (movement)  Result of Injury: No  ROS: Review of systems reviewed from Patient History Form completed today and available in the patient's chart under the Media tab.       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

## 2024-08-21 ENCOUNTER — PROCEDURE VISIT (OUTPATIENT)
Dept: CARDIOLOGY CLINIC | Age: 64
End: 2024-08-21

## 2024-08-21 DIAGNOSIS — I87.2 CHRONIC VENOUS INSUFFICIENCY: Primary | ICD-10-CM

## 2024-08-21 DIAGNOSIS — I83.893 VARICOSE VEINS OF BILATERAL LOWER EXTREMITIES WITH OTHER COMPLICATIONS: ICD-10-CM

## 2024-08-21 NOTE — PROGRESS NOTES
and venous access confirmed by aspiration of dark low pressure blood.     The RF probe was placed into the vein through the sheath and positioned at a point just distal (2-2.5 cm.) distal to the sapheno femoral junction using ultrasound guidance. Preferentially, imaging was used to place the catheter tip 1cm inferior to the superficial epigastric vein to preserve normal physiological flow in that vein.    After the RF probe position was verified by ultrasound, tumescent anesthesia was infiltrated, under ultrasound guidance, precisely into the jocelyn venous compartment along the entire length of vein from the entry site to the saphenofemoral junction until a “halo” of fluid was noted around the vein.    The patient was then placed in Trendelenburg position and the superficial venous system was exsanguinated. After RF probe position was again confirmed with ultrasound imaging, RF energy was applied. The probe was withdrawn at a rate of approximately 10 cm at 20 second intervals and monitored to keep the vein wall temperature within 120 ± 5° C and the generator output wattage within normal limits.    Total treatment time was 1min, 3 cycles, 60cm. Total Tumescent 100ml    Repeat ultrasound of the treated vein was performed confirming successful treatment and flow in the common femoral vein.The catheter and sheath were withdrawn and hemostasis established with direct pressure. After assuring hemostasis, the skin incision over the saphenous vein was closed with a bandage and a compression wrap and/or graduated compression stocking was applied from the level of the foot to the most proximal level of the thigh.    The patient ambulated 10 minutes under supervision and was without apparent concerns at time of release. Post care instructions include walking 5 minutes of every waking hour, wearing compression 24 hours per day, taking off only to shower and then reapplying for two weeks. The patient was instructed to take an

## 2024-09-16 NOTE — PROGRESS NOTES
Interventional Cardiology Consultation     Yony Elizabeth  1960    PCP: Kane Loco DO  Referring Physician: Kane Loco DO  Reason for Referral: venous insufficiency   Chief Complaint: \"I have varicose veins\"  Chief Complaint   Patient presents with    Follow-up     Follow up on right leg-states doing well       Subjective:   History of Present Illness: The patient is 64 y.o. male with a past medical history significant for carotid stenosis, HTN, HLD, mitral regurgitation, and NEELIMA. Patient initially seen for further management of venous insufficiency. Reports chronic varicose veins with associated pain and mild edema. s/p RF ablation right GSV 8/21/24. Patient here today for follow up. Reports significant improvement of symptoms post procedure. Denies any new or worsening swelling or leg pain.           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 08/25/2020    Carotid atherosclerosis     Cerebral aneurysm rupture (HCC) 10/01/2017    Colon polyps     Dilated aortic root (HCC)     HTN (hypertension)     Hyperlipidemia     Impaired fasting glucose     Impaired glucose tolerance 10/29/2024    Mitral regurgitation     Mitral valve insufficiency     Psoriasis     RBBB (right bundle branch block)     Severe sleep apnea 11/11/2020    has inspire    Subarachnoid hematoma 10/01/2017    Varicose veins     Venous insufficiency     Venous reflux     Saphenofemoral     Past Surgical History:   Procedure Laterality Date    COLONOSCOPY  01/01/2001    COLONOSCOPY  01/01/2005    COLONOSCOPY  03/01/2010    Dr. Mitchell    HIP SURGERY Right 5/16/2023    RIGHT HIP DIAGNOSTIC ARTHROSCOPY, LABRAL REPAIR, FEMOROACETABULAR IMPINGEMENT DECOMPRESSION -BLOCK(PENG); LOCAL (ORTHOMIX) performed by Santiago Seaz MD at [facility] OR    ROTATOR CUFF REPAIR Right 01/08/2013    Dr. Correa    SHOULDER ARTHROSCOPY Left 03/19/2019    LEFT SHOULDER ARTHROSCOPE

## 2024-09-18 ENCOUNTER — OFFICE VISIT (OUTPATIENT)
Dept: CARDIOLOGY CLINIC | Age: 64
End: 2024-09-18

## 2024-09-18 VITALS
DIASTOLIC BLOOD PRESSURE: 58 MMHG | SYSTOLIC BLOOD PRESSURE: 104 MMHG | HEART RATE: 58 BPM | HEIGHT: 71 IN | BODY MASS INDEX: 27.02 KG/M2 | OXYGEN SATURATION: 97 % | WEIGHT: 193 LBS

## 2024-09-18 DIAGNOSIS — I83.893 VARICOSE VEINS OF BOTH LEGS WITH EDEMA: Primary | ICD-10-CM

## 2024-10-24 SDOH — HEALTH STABILITY: PHYSICAL HEALTH: ON AVERAGE, HOW MANY DAYS PER WEEK DO YOU ENGAGE IN MODERATE TO STRENUOUS EXERCISE (LIKE A BRISK WALK)?: 4 DAYS

## 2024-10-24 SDOH — HEALTH STABILITY: PHYSICAL HEALTH: ON AVERAGE, HOW MANY MINUTES DO YOU ENGAGE IN EXERCISE AT THIS LEVEL?: 30 MIN

## 2024-10-24 ASSESSMENT — PATIENT HEALTH QUESTIONNAIRE - PHQ9
1. LITTLE INTEREST OR PLEASURE IN DOING THINGS: NOT AT ALL
SUM OF ALL RESPONSES TO PHQ QUESTIONS 1-9: 0
SUM OF ALL RESPONSES TO PHQ9 QUESTIONS 1 & 2: 0
2. FEELING DOWN, DEPRESSED OR HOPELESS: NOT AT ALL
SUM OF ALL RESPONSES TO PHQ QUESTIONS 1-9: 0

## 2024-10-24 ASSESSMENT — LIFESTYLE VARIABLES
HOW OFTEN DO YOU HAVE A DRINK CONTAINING ALCOHOL: 4
HOW MANY STANDARD DRINKS CONTAINING ALCOHOL DO YOU HAVE ON A TYPICAL DAY: 1 OR 2
HOW OFTEN DO YOU HAVE A DRINK CONTAINING ALCOHOL: 2-3 TIMES A WEEK
HOW MANY STANDARD DRINKS CONTAINING ALCOHOL DO YOU HAVE ON A TYPICAL DAY: 1
HOW OFTEN DO YOU HAVE SIX OR MORE DRINKS ON ONE OCCASION: 1

## 2024-10-29 ENCOUNTER — OFFICE VISIT (OUTPATIENT)
Dept: INTERNAL MEDICINE CLINIC | Age: 64
End: 2024-10-29

## 2024-10-29 VITALS
SYSTOLIC BLOOD PRESSURE: 128 MMHG | TEMPERATURE: 98.1 F | HEART RATE: 53 BPM | DIASTOLIC BLOOD PRESSURE: 60 MMHG | HEIGHT: 70 IN | OXYGEN SATURATION: 97 % | WEIGHT: 194 LBS | BODY MASS INDEX: 27.77 KG/M2

## 2024-10-29 DIAGNOSIS — Z00.00 WELCOME TO MEDICARE PREVENTIVE VISIT: ICD-10-CM

## 2024-10-29 DIAGNOSIS — I34.0 MITRAL VALVE INSUFFICIENCY, UNSPECIFIED ETIOLOGY: ICD-10-CM

## 2024-10-29 DIAGNOSIS — I10 BENIGN ESSENTIAL HTN: ICD-10-CM

## 2024-10-29 DIAGNOSIS — Z29.11 NEED FOR RSV IMMUNIZATION: ICD-10-CM

## 2024-10-29 DIAGNOSIS — R73.02 IMPAIRED GLUCOSE TOLERANCE: ICD-10-CM

## 2024-10-29 DIAGNOSIS — I67.1 ANEURYSM OF CAVERNOUS PORTION OF RIGHT INTERNAL CAROTID ARTERY: ICD-10-CM

## 2024-10-29 DIAGNOSIS — R73.01 IMPAIRED FASTING GLUCOSE: ICD-10-CM

## 2024-10-29 DIAGNOSIS — Z13.1 SCREENING FOR DIABETES MELLITUS: ICD-10-CM

## 2024-10-29 DIAGNOSIS — Z00.00 WELCOME TO MEDICARE PREVENTIVE VISIT: Primary | ICD-10-CM

## 2024-10-29 DIAGNOSIS — I65.21 STENOSIS OF INTRACRANIAL PORTIONS OF RIGHT INTERNAL CAROTID ARTERY: ICD-10-CM

## 2024-10-29 DIAGNOSIS — E78.5 HYPERLIPIDEMIA, UNSPECIFIED HYPERLIPIDEMIA TYPE: ICD-10-CM

## 2024-10-29 DIAGNOSIS — I87.2 CHRONIC VENOUS INSUFFICIENCY: ICD-10-CM

## 2024-10-29 PROBLEM — M65.20 CALCIFIC TENDINITIS: Status: RESOLVED | Noted: 2019-01-14 | Resolved: 2024-10-29

## 2024-10-29 PROBLEM — H53.122 TRANSIENT VISUAL LOSS, LEFT EYE: Status: RESOLVED | Noted: 2022-07-25 | Resolved: 2024-10-29

## 2024-10-29 PROBLEM — H53.47 HEMIANOPSIA: Status: RESOLVED | Noted: 2023-09-08 | Resolved: 2024-10-29

## 2024-10-29 PROBLEM — I83.893 VARICOSE VEINS OF BILATERAL LOWER EXTREMITIES WITH OTHER COMPLICATIONS: Status: RESOLVED | Noted: 2024-05-06 | Resolved: 2024-10-29

## 2024-10-29 PROBLEM — G45.1 TIA INVOLVING LEFT INTERNAL CAROTID ARTERY: Status: RESOLVED | Noted: 2023-09-08 | Resolved: 2024-10-29

## 2024-10-29 RX ORDER — PREDNISONE 1 MG/1
4 TABLET ORAL DAILY
COMMUNITY

## 2024-10-29 RX ORDER — RESPIRATORY SYNCYTIAL VISUS VACCINE RECOMBINANT, ADJUVANTED 120MCG/0.5
0.5 KIT INTRAMUSCULAR ONCE
Qty: 0.5 ML | Refills: 0 | Status: SHIPPED | OUTPATIENT
Start: 2024-10-29 | End: 2024-10-29

## 2024-10-29 NOTE — PROGRESS NOTES
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 08/25/2020    Carotid atherosclerosis     Cerebral aneurysm rupture (HCC) 10/01/2017    Colon polyps     Dilated aortic root (HCC)     HTN (hypertension)     Hyperlipidemia     Impaired fasting glucose     Impaired glucose tolerance 10/29/2024    Mitral regurgitation     Mitral valve insufficiency     Psoriasis     RBBB (right bundle branch block)     Severe sleep apnea 11/11/2020    has inspire    Subarachnoid hematoma 10/01/2017    Varicose veins     Venous insufficiency     Venous reflux     Saphenofemoral          Past Surgical History:   Procedure Laterality Date    COLONOSCOPY  01/01/2001    COLONOSCOPY  01/01/2005    COLONOSCOPY  03/01/2010    Dr. Mitchell    HIP SURGERY Right 5/16/2023    RIGHT HIP DIAGNOSTIC ARTHROSCOPY, LABRAL REPAIR, FEMOROACETABULAR IMPINGEMENT DECOMPRESSION -BLOCK(PENG); LOCAL (ORTHOMIX) performed by Santiago Saez MD at [facility] OR    ROTATOR CUFF REPAIR Right 01/08/2013    Dr. Correa    SHOULDER ARTHROSCOPY Left 03/19/2019    LEFT SHOULDER ARTHROSCOPE WITH DEBRIDEMENT, SUBACROMIAL DECOMPRESSION, DISTAL CLAVICLE EXCISION, CALCIFIC TENDONITIS DEBRIDEMENT WITH ROTATOR CUFF AUGMENTATION performed by Valeriano Correa DO at [facility] OR    THROAT SURGERY  04/26/2022    [facility]    VEIN SURGERY Right 06/22/2015    RF ablation SFJ, calf , Dr. Dorado    WRIST SURGERY Left     cyst           Medications/Allergies     Current Outpatient Medications   Medication Sig Dispense Refill    predniSONE (DELTASONE) 1 MG tablet Take 4 tablets by mouth daily      respiratory syncytial vaccine, adjuvanted (AREXVY) 120 MCG/0.5ML

## 2024-10-29 NOTE — PATIENT INSTRUCTIONS
Personalized Preventive Plan for Yony Elizabeth - 10/29/2024  Medicare offers a range of preventive health benefits. Some of the tests and screenings are paid in full while other may be subject to a deductible, co-insurance, and/or copay.    Some of these benefits include a comprehensive review of your medical history including lifestyle, illnesses that may run in your family, and various assessments and screenings as appropriate.    After reviewing your medical record and screening and assessments performed today your provider may have ordered immunizations, labs, imaging, and/or referrals for you.  A list of these orders (if applicable) as well as your Preventive Care list are included within your After Visit Summary for your review.    Other Preventive Recommendations:    A preventive eye exam performed by an eye specialist is recommended every year to screen for glaucoma; cataracts, macular degeneration, and other eye disorders.  A preventive dental visit is recommended every 6 months.  Try to get at least 150 minutes of exercise per week or 10,000 steps per day on a pedometer .  Order or download the FREE \"Exercise & Physical Activity: Your Everyday Guide\" from [employer]. Call 1-467.418.4680 or search [employer] online.  You need 1377-3096 mg of calcium and 1420-4934 IU of vitamin D per day. It is possible to meet your calcium requirement with diet alone, but a vitamin D supplement is usually necessary to meet this goal.  When exposed to the sun, use a sunscreen that protects against both UVA and UVB radiation with an SPF of 30 or greater. Reapply every 2 to 3 hours or after sweating, drying off with a towel, or swimming.  Always wear a seat belt when traveling in a car. Always wear a helmet when riding a bicycle or motorcycle.     Substance Use Disorder: Care Instructions  Overview     You can improve your life and health by stopping your use of alcohol or drugs.

## 2024-10-30 LAB
ALBUMIN SERPL-MCNC: 4.4 G/DL (ref 3.4–5)
ALBUMIN/GLOB SERPL: 1.9 {RATIO} (ref 1.1–2.2)
ALP SERPL-CCNC: 55 U/L (ref 40–129)
ALT SERPL-CCNC: 23 U/L (ref 10–40)
ANION GAP SERPL CALCULATED.3IONS-SCNC: 11 MMOL/L (ref 3–16)
AST SERPL-CCNC: 22 U/L (ref 15–37)
BASOPHILS # BLD: 0.1 K/UL (ref 0–0.2)
BASOPHILS NFR BLD: 1 %
BILIRUB SERPL-MCNC: 0.4 MG/DL (ref 0–1)
BUN SERPL-MCNC: 23 MG/DL (ref 7–20)
CALCIUM SERPL-MCNC: 9.9 MG/DL (ref 8.3–10.6)
CHLORIDE SERPL-SCNC: 100 MMOL/L (ref 99–110)
CHOLEST SERPL-MCNC: 161 MG/DL (ref 0–199)
CO2 SERPL-SCNC: 26 MMOL/L (ref 21–32)
CREAT SERPL-MCNC: 1 MG/DL (ref 0.8–1.3)
DEPRECATED RDW RBC AUTO: 13.4 % (ref 12.4–15.4)
EOSINOPHIL # BLD: 0.1 K/UL (ref 0–0.6)
EOSINOPHIL NFR BLD: 1.2 %
EST. AVERAGE GLUCOSE BLD GHB EST-MCNC: 119.8 MG/DL
GFR SERPLBLD CREATININE-BSD FMLA CKD-EPI: 83 ML/MIN/{1.73_M2}
GLUCOSE SERPL-MCNC: 96 MG/DL (ref 70–99)
HBA1C MFR BLD: 5.8 %
HCT VFR BLD AUTO: 40.8 % (ref 40.5–52.5)
HDLC SERPL-MCNC: 62 MG/DL (ref 40–60)
HGB BLD-MCNC: 14 G/DL (ref 13.5–17.5)
LDLC SERPL CALC-MCNC: 81 MG/DL
LYMPHOCYTES # BLD: 1.1 K/UL (ref 1–5.1)
LYMPHOCYTES NFR BLD: 16 %
MCH RBC QN AUTO: 33.3 PG (ref 26–34)
MCHC RBC AUTO-ENTMCNC: 34.2 G/DL (ref 31–36)
MCV RBC AUTO: 97.2 FL (ref 80–100)
MONOCYTES # BLD: 0.5 K/UL (ref 0–1.3)
MONOCYTES NFR BLD: 6.6 %
NEUTROPHILS # BLD: 5.4 K/UL (ref 1.7–7.7)
NEUTROPHILS NFR BLD: 75.2 %
PLATELET # BLD AUTO: 292 K/UL (ref 135–450)
PMV BLD AUTO: 8.1 FL (ref 5–10.5)
POTASSIUM SERPL-SCNC: 4.8 MMOL/L (ref 3.5–5.1)
PROT SERPL-MCNC: 6.7 G/DL (ref 6.4–8.2)
RBC # BLD AUTO: 4.2 M/UL (ref 4.2–5.9)
SODIUM SERPL-SCNC: 137 MMOL/L (ref 136–145)
TRIGL SERPL-MCNC: 88 MG/DL (ref 0–150)
TSH SERPL DL<=0.005 MIU/L-ACNC: 1.19 UIU/ML (ref 0.27–4.2)
VLDLC SERPL CALC-MCNC: 18 MG/DL
WBC # BLD AUTO: 7.2 K/UL (ref 4–11)

## 2024-12-20 SDOH — HEALTH STABILITY: PHYSICAL HEALTH: ON AVERAGE, HOW MANY MINUTES DO YOU ENGAGE IN EXERCISE AT THIS LEVEL?: 30 MIN

## 2024-12-20 SDOH — HEALTH STABILITY: PHYSICAL HEALTH: ON AVERAGE, HOW MANY DAYS PER WEEK DO YOU ENGAGE IN MODERATE TO STRENUOUS EXERCISE (LIKE A BRISK WALK)?: 4 DAYS

## 2024-12-23 ENCOUNTER — OFFICE VISIT (OUTPATIENT)
Dept: ORTHOPEDIC SURGERY | Age: 64
End: 2024-12-23
Payer: MEDICARE

## 2024-12-23 ENCOUNTER — TELEPHONE (OUTPATIENT)
Dept: ORTHOPEDIC SURGERY | Age: 64
End: 2024-12-23

## 2024-12-23 VITALS — WEIGHT: 194 LBS | BODY MASS INDEX: 27.77 KG/M2 | HEIGHT: 70 IN

## 2024-12-23 DIAGNOSIS — S46.011A STRAIN OF RIGHT ROTATOR CUFF CAPSULE, INITIAL ENCOUNTER: Primary | ICD-10-CM

## 2024-12-23 DIAGNOSIS — M25.511 RIGHT SHOULDER PAIN, UNSPECIFIED CHRONICITY: ICD-10-CM

## 2024-12-23 DIAGNOSIS — S46.211A BICEPS TENDON RUPTURE, PROXIMAL, RIGHT, INITIAL ENCOUNTER: ICD-10-CM

## 2024-12-23 PROCEDURE — G8419 CALC BMI OUT NRM PARAM NOF/U: HCPCS | Performed by: ORTHOPAEDIC SURGERY

## 2024-12-23 PROCEDURE — 3017F COLORECTAL CA SCREEN DOC REV: CPT | Performed by: ORTHOPAEDIC SURGERY

## 2024-12-23 PROCEDURE — 99214 OFFICE O/P EST MOD 30 MIN: CPT | Performed by: ORTHOPAEDIC SURGERY

## 2024-12-23 PROCEDURE — 1123F ACP DISCUSS/DSCN MKR DOCD: CPT | Performed by: ORTHOPAEDIC SURGERY

## 2024-12-23 PROCEDURE — G8484 FLU IMMUNIZE NO ADMIN: HCPCS | Performed by: ORTHOPAEDIC SURGERY

## 2024-12-23 PROCEDURE — G8428 CUR MEDS NOT DOCUMENT: HCPCS | Performed by: ORTHOPAEDIC SURGERY

## 2024-12-23 PROCEDURE — 1125F AMNT PAIN NOTED PAIN PRSNT: CPT | Performed by: ORTHOPAEDIC SURGERY

## 2024-12-23 PROCEDURE — 1036F TOBACCO NON-USER: CPT | Performed by: ORTHOPAEDIC SURGERY

## 2024-12-23 NOTE — TELEPHONE ENCOUNTER
S/w Yony Elizabeth  regarding MRI Right Shoulder approval and authorization being valid until 12/23/2025.  Patient was instructed that their MRI needs to be scheduled at [facility] . The patient was instructed to contact the facility to schedule  at [phone].    A follow up appointment will need to be scheduled to review the results and treatment plan.     The patient has elected to contact the office at a later time to schedule a follow up appointment.

## 2024-12-23 NOTE — PROGRESS NOTES
[facility]  Office Visit    Date:  2024    Name:  Yony Elizabeth  Address:  [address] [place] [address]    :  1960      Age:   64 y.o.    SSN:  [id number]      Medical Record Number:  [id number]    HPI:   Yony Elizabeth is a 64 y.o. right hand dominant male who presents for right shoulder pain.  He sustained a fall in his right shoulder in [place] 10 days ago.  He was jumping of 5 feet fence when he landed directly on his right shoulder.  He was seen in [place] where x-rays showed a grade 1 AC joint separation.  He was asked to follow-up with orthopedics once he is back in [place].  He reports the pain is mainly localized to the anterior aspect of his shoulder and AC joint.  The pain has gotten significantly better over the past 10 days.  He has been taking over-the-counter pain medications.  The pain is worse with overhead activities.  He has significant bruising along the upper aspect of his arm and chest area.    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

## 2024-12-24 DIAGNOSIS — I67.9 CEREBROVASCULAR DISEASE: ICD-10-CM

## 2024-12-24 DIAGNOSIS — E78.5 HYPERLIPIDEMIA, UNSPECIFIED HYPERLIPIDEMIA TYPE: ICD-10-CM

## 2024-12-26 ENCOUNTER — TELEPHONE (OUTPATIENT)
Dept: ORTHOPEDIC SURGERY | Age: 64
End: 2024-12-26

## 2024-12-26 RX ORDER — ATORVASTATIN CALCIUM 80 MG/1
80 TABLET, FILM COATED ORAL DAILY
Qty: 90 TABLET | Refills: 3 | Status: SHIPPED | OUTPATIENT
Start: 2024-12-26

## 2024-12-26 NOTE — TELEPHONE ENCOUNTER
Last appointment: 10/29/2024  Next appointment: Visit date not found  Return in about 6 months (around 4/29/2025) for check up for chronic medical problems.      Last refill:   Last ordered: 10 months ago (2/12/2024) by Kane Loco DO           Requested Prescriptions     Pending Prescriptions Disp Refills    atorvastatin (LIPITOR) 80 MG tablet [Pharmacy Med Name: Atorvastatin Calcium 80 MG Oral Tablet] 90 tablet 3     Sig: TAKE 1 TABLET BY MOUTH ONCE  DAILY     Appointment not due for >6 months.

## 2024-12-26 NOTE — TELEPHONE ENCOUNTER
PT IS REQUESTING MRI ORDER BE FAXED TO BOTH FACILITIES.    FAX: [phone]([facility])  FAX:[phone]([place])    PT CONTACT:[phone]

## 2024-12-31 ENCOUNTER — TELEPHONE (OUTPATIENT)
Dept: ORTHOPEDIC SURGERY | Age: 64
End: 2024-12-31

## 2024-12-31 NOTE — TELEPHONE ENCOUNTER
S/w patient.  Appointment for follow up as been made for 01/06/2025.  All questions answered.  MRI results and images will be reviewed at that time.

## 2024-12-31 NOTE — TELEPHONE ENCOUNTER
Test Results     Type of Test: MRI   Date of Test: 12/29  Location of Test: [facility]  Patient Contact Number:  [phone]

## 2025-01-02 ENCOUNTER — TELEPHONE (OUTPATIENT)
Dept: ORTHOPEDIC SURGERY | Age: 65
End: 2025-01-02

## 2025-01-02 NOTE — TELEPHONE ENCOUNTER
Patient already had MRI at another facility and xray orders are not needed.  No need to call central scheduling or the patient.

## 2025-01-02 NOTE — TELEPHONE ENCOUNTER
[facility] SCHEDUL[facility] CALLING REGARDING XRAY ORDER.  IMAGING IS NEEDING AN XRAY OF THE FACE, NECK AND CHEST BECAUSE PATIENT HAS AN \"INSPIRE\" DEVICE.  PLEASE CALL PATIENT WHEN ORDER IS READY.

## 2025-01-06 ENCOUNTER — TELEPHONE (OUTPATIENT)
Dept: ORTHOPEDIC SURGERY | Age: 65
End: 2025-01-06

## 2025-01-06 NOTE — TELEPHONE ENCOUNTER
Appointment Request     Patient requesting earlier appointment: Yes  Appointment offered to patient: NO   Patient Contact Number: [phone]       PATIENT CALLING REGARDING RT SHOULDER MRI RESULTS     PATIENT WILL BE GOING OUT OF TOWN 1/8/25 FOR 3 MONTHS     PATIENT WOULD LIKE TO KNOW IF HE CAN JORDY A VIRTUAL VISIT     PLEASE CALL PATIENT AT THE ABOVE NUMBER

## 2025-01-07 ENCOUNTER — TELEPHONE (OUTPATIENT)
Dept: ORTHOPEDIC SURGERY | Age: 65
End: 2025-01-07

## 2025-01-07 NOTE — TELEPHONE ENCOUNTER
Spoke to patient regarding MRI results.     He has felt much improved in his shoulder discomfort. He was able to shovel snow yesterday with no pain and no residual pain today.   He is planning to go to [place] for the rest of the winter and will return to [place] in march. He will schedule a follow up at that time.     All questions answered.     VIRGINIE Dumont

## 2025-02-17 RX ORDER — LISINOPRIL 10 MG/1
10 TABLET ORAL NIGHTLY
Qty: 90 TABLET | Refills: 1 | Status: SHIPPED | OUTPATIENT
Start: 2025-02-17

## 2025-02-17 NOTE — TELEPHONE ENCOUNTER
Last appointment: 10/29/2024  Next appointment: Visit date not found  Return in about 6 months (around 4/29/2025) for check up for chronic medical problems.      Sent [employer] message to schedule next appointment due in April.    Last refill: 3/14/24

## 2025-04-28 SDOH — ECONOMIC STABILITY: INCOME INSECURITY: IN THE LAST 12 MONTHS, WAS THERE A TIME WHEN YOU WERE NOT ABLE TO PAY THE MORTGAGE OR RENT ON TIME?: PATIENT DECLINED

## 2025-04-28 SDOH — ECONOMIC STABILITY: FOOD INSECURITY: WITHIN THE PAST 12 MONTHS, THE FOOD YOU BOUGHT JUST DIDN'T LAST AND YOU DIDN'T HAVE MONEY TO GET MORE.: PATIENT DECLINED

## 2025-04-28 SDOH — ECONOMIC STABILITY: FOOD INSECURITY: WITHIN THE PAST 12 MONTHS, YOU WORRIED THAT YOUR FOOD WOULD RUN OUT BEFORE YOU GOT MONEY TO BUY MORE.: PATIENT DECLINED

## 2025-04-28 SDOH — ECONOMIC STABILITY: TRANSPORTATION INSECURITY
IN THE PAST 12 MONTHS, HAS THE LACK OF TRANSPORTATION KEPT YOU FROM MEDICAL APPOINTMENTS OR FROM GETTING MEDICATIONS?: PATIENT DECLINED

## 2025-04-28 ASSESSMENT — PATIENT HEALTH QUESTIONNAIRE - PHQ9
SUM OF ALL RESPONSES TO PHQ QUESTIONS 1-9: 0
SUM OF ALL RESPONSES TO PHQ9 QUESTIONS 1 & 2: 0
1. LITTLE INTEREST OR PLEASURE IN DOING THINGS: NOT AT ALL
SUM OF ALL RESPONSES TO PHQ QUESTIONS 1-9: 0
2. FEELING DOWN, DEPRESSED OR HOPELESS: NOT AT ALL
SUM OF ALL RESPONSES TO PHQ QUESTIONS 1-9: 0
SUM OF ALL RESPONSES TO PHQ QUESTIONS 1-9: 0
1. LITTLE INTEREST OR PLEASURE IN DOING THINGS: NOT AT ALL
2. FEELING DOWN, DEPRESSED OR HOPELESS: NOT AT ALL

## 2025-05-01 ENCOUNTER — OFFICE VISIT (OUTPATIENT)
Dept: INTERNAL MEDICINE CLINIC | Age: 65
End: 2025-05-01

## 2025-05-01 VITALS
SYSTOLIC BLOOD PRESSURE: 115 MMHG | TEMPERATURE: 97.5 F | HEART RATE: 57 BPM | DIASTOLIC BLOOD PRESSURE: 60 MMHG | WEIGHT: 193.2 LBS | OXYGEN SATURATION: 99 % | BODY MASS INDEX: 27.72 KG/M2

## 2025-05-01 DIAGNOSIS — I87.2 CHRONIC VENOUS INSUFFICIENCY: ICD-10-CM

## 2025-05-01 DIAGNOSIS — M35.3 PMR (POLYMYALGIA RHEUMATICA): ICD-10-CM

## 2025-05-01 DIAGNOSIS — I34.0 MITRAL VALVE INSUFFICIENCY, UNSPECIFIED ETIOLOGY: ICD-10-CM

## 2025-05-01 DIAGNOSIS — I67.1 ANEURYSM OF CAVERNOUS PORTION OF RIGHT INTERNAL CAROTID ARTERY: ICD-10-CM

## 2025-05-01 DIAGNOSIS — Z86.73 HISTORY OF TIA (TRANSIENT ISCHEMIC ATTACK): ICD-10-CM

## 2025-05-01 DIAGNOSIS — E78.5 HYPERLIPIDEMIA, UNSPECIFIED HYPERLIPIDEMIA TYPE: ICD-10-CM

## 2025-05-01 DIAGNOSIS — I65.21 STENOSIS OF INTRACRANIAL PORTIONS OF RIGHT INTERNAL CAROTID ARTERY: ICD-10-CM

## 2025-05-01 DIAGNOSIS — Z86.73 HISTORY OF STROKE: ICD-10-CM

## 2025-05-01 DIAGNOSIS — Z12.5 SCREENING FOR PROSTATE CANCER: ICD-10-CM

## 2025-05-01 DIAGNOSIS — I10 BENIGN ESSENTIAL HTN: Primary | ICD-10-CM

## 2025-05-01 DIAGNOSIS — H53.122 TRANSIENT VISUAL LOSS, LEFT EYE: ICD-10-CM

## 2025-05-01 DIAGNOSIS — Z01.84 IMMUNITY STATUS TESTING: ICD-10-CM

## 2025-05-01 DIAGNOSIS — R73.01 IMPAIRED FASTING GLUCOSE: ICD-10-CM

## 2025-05-01 SDOH — ECONOMIC STABILITY: FOOD INSECURITY: WITHIN THE PAST 12 MONTHS, YOU WORRIED THAT YOUR FOOD WOULD RUN OUT BEFORE YOU GOT MONEY TO BUY MORE.: NEVER TRUE

## 2025-05-01 SDOH — ECONOMIC STABILITY: FOOD INSECURITY: WITHIN THE PAST 12 MONTHS, THE FOOD YOU BOUGHT JUST DIDN'T LAST AND YOU DIDN'T HAVE MONEY TO GET MORE.: NEVER TRUE

## 2025-05-01 NOTE — PROGRESS NOTES
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 08/25/2020    Carotid atherosclerosis     Cerebral aneurysm rupture (HCC) 10/01/2017    Colon polyps     Dilated aortic root     HTN (hypertension)     Hyperlipidemia     Impaired fasting glucose     Impaired glucose tolerance 10/29/2024    Mitral regurgitation     Mitral valve insufficiency     Psoriasis     RBBB (right bundle branch block)     Severe sleep apnea 11/11/2020    has inspire    Subarachnoid hematoma (HCC) 10/01/2017    Varicose veins     Venous insufficiency     Venous reflux     Saphenofemoral         MEDICATIONS:  Medication Sig   clobetasol (TEMOVATE) 0.05 % cream Apply topically 2 times daily for 10 days as needed.  Do not use more than 10 days.   atorvastatin (LIPITOR) 20 MG tablet TAKE 1 TABLET DAILY   lisinopril (PRINIVIL;ZESTRIL) 10 MG tablet TAKE 1 TABLET DAILY   ASPIRIN LOW DOSE 81 MG EC tablet TAKE 1 TABLET DAILY   Cetirizine HCl (ZYRTEC ALLERGY PO)   Take 10 mg by mouth daily           Review of Systems - As per HPI        OBJECTIVE:  Vitals:    05/01/25 0921   BP: 115/60   BP Site: Right Upper Arm   Patient Position: Sitting   BP Cuff Size: Large Adult   Pulse: 57   Temp: 97.5 °F (36.4 °C)   SpO2: 99%   Weight: 87.6 kg (193 lb 3.2 oz)     Body mass index is 27.72 kg/m².     Wt Readings from Last 3 Encounters:   05/01/25 87.6 kg (193 lb 3.2 oz)   12/23/24 88 kg (194 lb)   10/29/24 88 kg (194 lb)     BP Readings from Last 3 Encounters:   05/01/25 115/60   10/29/24 128/60   09/18/24 (!) 104/58      GEN: NAD, A&O, Non-toxic  HEENT: NC/AT, KRIS, EOMI, Oral cavity Clear, anicteric  NECK: Supple.  No thyromegaly.  No JVD  LYMPH: No C/SC nodes.  CV: Heart rhythm is regular.  Rate normal.  No murmurs.  No ectopy.  PULM: CTA  EXT: + BL LE, Trace  Pitting edema, mostly about the ankles.   Patient does have varicose veins and dilated venules  GI: Abdomen is soft, NT, BS+, No hepatomegaly.  No masses.  NEURO: No focal

## 2025-05-06 DIAGNOSIS — Z12.5 SCREENING FOR PROSTATE CANCER: ICD-10-CM

## 2025-05-06 DIAGNOSIS — Z01.84 IMMUNITY STATUS TESTING: ICD-10-CM

## 2025-05-06 DIAGNOSIS — I10 BENIGN ESSENTIAL HTN: ICD-10-CM

## 2025-05-06 DIAGNOSIS — E78.5 HYPERLIPIDEMIA, UNSPECIFIED HYPERLIPIDEMIA TYPE: ICD-10-CM

## 2025-05-06 DIAGNOSIS — R73.01 IMPAIRED FASTING GLUCOSE: ICD-10-CM

## 2025-05-07 ENCOUNTER — RESULTS FOLLOW-UP (OUTPATIENT)
Dept: INTERNAL MEDICINE CLINIC | Age: 65
End: 2025-05-07

## 2025-05-07 LAB
ALBUMIN SERPL-MCNC: 4 G/DL (ref 3.4–5)
ALBUMIN/GLOB SERPL: 1.9 {RATIO} (ref 1.1–2.2)
ALP SERPL-CCNC: 48 U/L (ref 40–129)
ALT SERPL-CCNC: 23 U/L (ref 10–40)
ANION GAP SERPL CALCULATED.3IONS-SCNC: 7 MMOL/L (ref 3–16)
AST SERPL-CCNC: 20 U/L (ref 15–37)
BASOPHILS # BLD: 0.1 K/UL (ref 0–0.2)
BASOPHILS NFR BLD: 1.3 %
BILIRUB SERPL-MCNC: <0.2 MG/DL (ref 0–1)
BUN SERPL-MCNC: 27 MG/DL (ref 7–20)
CALCIUM SERPL-MCNC: 8.6 MG/DL (ref 8.3–10.6)
CHLORIDE SERPL-SCNC: 106 MMOL/L (ref 99–110)
CHOLEST SERPL-MCNC: 139 MG/DL (ref 0–199)
CO2 SERPL-SCNC: 27 MMOL/L (ref 21–32)
CREAT SERPL-MCNC: 0.9 MG/DL (ref 0.8–1.3)
DEPRECATED RDW RBC AUTO: 12.7 % (ref 12.4–15.4)
EOSINOPHIL # BLD: 0.3 K/UL (ref 0–0.6)
EOSINOPHIL NFR BLD: 5.4 %
EST. AVERAGE GLUCOSE BLD GHB EST-MCNC: 116.9 MG/DL
GFR SERPLBLD CREATININE-BSD FMLA CKD-EPI: >90 ML/MIN/{1.73_M2}
GLUCOSE SERPL-MCNC: 92 MG/DL (ref 70–99)
HBA1C MFR BLD: 5.7 %
HCT VFR BLD AUTO: 40.6 % (ref 40.5–52.5)
HDLC SERPL-MCNC: 53 MG/DL (ref 40–60)
HGB BLD-MCNC: 14 G/DL (ref 13.5–17.5)
LDLC SERPL CALC-MCNC: 71 MG/DL
LYMPHOCYTES # BLD: 1.8 K/UL (ref 1–5.1)
LYMPHOCYTES NFR BLD: 30.5 %
MCH RBC QN AUTO: 32.4 PG (ref 26–34)
MCHC RBC AUTO-ENTMCNC: 34.4 G/DL (ref 31–36)
MCV RBC AUTO: 94.4 FL (ref 80–100)
MEV IGG SER QL IA: NORMAL
MONOCYTES # BLD: 0.6 K/UL (ref 0–1.3)
MONOCYTES NFR BLD: 10.2 %
MUV IGG SER QL IA: NORMAL
NEUTROPHILS # BLD: 3.2 K/UL (ref 1.7–7.7)
NEUTROPHILS NFR BLD: 52.6 %
PLATELET # BLD AUTO: 307 K/UL (ref 135–450)
PMV BLD AUTO: 7.8 FL (ref 5–10.5)
POTASSIUM SERPL-SCNC: 4.6 MMOL/L (ref 3.5–5.1)
PROT SERPL-MCNC: 6.1 G/DL (ref 6.4–8.2)
PSA SERPL DL<=0.01 NG/ML-MCNC: 1.61 NG/ML (ref 0–4)
RBC # BLD AUTO: 4.3 M/UL (ref 4.2–5.9)
RBC MORPH BLD: NORMAL
RUBV IGG SERPL QL IA: NORMAL
SLIDE REVIEW: NORMAL
SODIUM SERPL-SCNC: 140 MMOL/L (ref 136–145)
TRIGL SERPL-MCNC: 77 MG/DL (ref 0–150)
TSH SERPL DL<=0.005 MIU/L-ACNC: 2.41 UIU/ML (ref 0.27–4.2)
VLDLC SERPL CALC-MCNC: 15 MG/DL
VZV IGG SER QL IA: NORMAL
WBC # BLD AUTO: 6 K/UL (ref 4–11)

## (undated) DEVICE — TUBING ENDOSCP RED CROSSFLOW OUTFLO CASS FOR ARTHRO PMP

## (undated) DEVICE — DYONICS 25 DAY TUBE SET MUST BE                                    USED WITH 7211008, 3 PER BOX

## (undated) DEVICE — XL AGGRESSIVE PLUS, ANGLED HIP CUTTER. ARTHROSCOPIC SHAVER BLADE. FOR USE WITH: REF 0375-701-500, 0375-704-500, 0375-708-500. DO NOT USE IF PACKAGE IS DAMAGED, KEEP DRY, KEEP AWAY FROM SUNLIGHT: Brand: FORMULA

## (undated) DEVICE — GAUZE,SPONGE,4"X4",8PLY,STRL,LF,10/TRAY: Brand: MEDLINE

## (undated) DEVICE — DRESSING FOAM W3XL3IN GENTLE SIL FACE BORD ADH PD SUP ABSRB

## (undated) DEVICE — TUBING PMP FOR ARTHRO CROSSFLOW INFLO CASS

## (undated) DEVICE — SUTURE VCRL + SZ 2-0 L18IN ABSRB VLT L26MM SH 1/2 CIR VCP775D

## (undated) DEVICE — BUR SURG HD DIA5.5MM S STL RND LNG HIP 8 FLUT

## (undated) DEVICE — SYRINGE MED 5ML STD CLR PLAS LUERLOCK TIP N CTRL DISP

## (undated) DEVICE — TUBING, SUCTION, 1/4" X 10', STRAIGHT: Brand: MEDLINE

## (undated) DEVICE — MEDI-VAC NON-CONDUCTIVE SUCTION TUBING: Brand: CARDINAL HEALTH

## (undated) DEVICE — INTENT TO BE USED WITH SUTURE MATERIAL FOR TISSUE CLOSURE: Brand: RICHARD-ALLAN® NEEDLE 1/2 CIRCLE TAPER

## (undated) DEVICE — SLING ARM L ABV 13IN DE-ROTATION STRP HOOKS PROVIDE IMMOB

## (undated) DEVICE — 3M™ WARMING BLANKET, LOWER BODY, 10 PER CASE, 42568: Brand: BAIR HUGGER™

## (undated) DEVICE — COVER,MAYO STAND,STERILE: Brand: MEDLINE

## (undated) DEVICE — 5.5 MM ELITE ACROMIOBLASTER                                    STRAIGHT DISPOSABLE BURRS, BRICK                                    RED, 10000 MAXIMUM RPM, PACKAGED 6                                    PER BOX, STERILE

## (undated) DEVICE — HYPODERMIC SAFETY NEEDLE: Brand: MAGELLAN

## (undated) DEVICE — BOWL MED L 32OZ PLAS W/ MOLD GRAD EZ OPN PEEL PCH

## (undated) DEVICE — SHEET,DRAPE,53X77,STERILE: Brand: MEDLINE

## (undated) DEVICE — TAPE SUT MULTIFILAMENT POLYOLEFIN XBRAID TT 3910900064

## (undated) DEVICE — APPLICATOR MEDICATED 26 CC SOLUTION HI LT ORNG CHLORAPREP

## (undated) DEVICE — MAT FLR ABS DISP

## (undated) DEVICE — Z INACTIVE USE 2660664 SOLUTION IRRIG 3000ML 0.9% SOD CHL USP UROMATIC PLAS CONT

## (undated) DEVICE — GLOVE SURG SZ 9 THK91MIL LTX FREE SYN POLYISOPRENE ANTI

## (undated) DEVICE — C-ARM: Brand: UNBRANDED

## (undated) DEVICE — BLADE ARTHSCP CRV HIP SHRP TIP

## (undated) DEVICE — 3M™ STERI-DRAPE™ U-DRAPE 1015: Brand: STERI-DRAPE™

## (undated) DEVICE — INTENDED FOR TISSUE SEPARATION, AND OTHER PROCEDURES THAT REQUIRE A SHARP SURGICAL BLADE TO PUNCTURE OR CUT.: Brand: BARD-PARKER ® STAINLESS STEEL BLADES

## (undated) DEVICE — GLOVE SURG SZ 7 L12IN FNGR THK79MIL GRN LTX FREE

## (undated) DEVICE — INCISOR PLUS PLATINUM 4.5 MM BLADE: Brand: DYONICS INCISOR

## (undated) DEVICE — PACK PROCEDURE SURG SHLDR MFFOP CUST

## (undated) DEVICE — SYRINGE MED 30ML STD CLR PLAS LUERLOCK TIP N CTRL DISP

## (undated) DEVICE — SLINGSHOT 70 UP: Brand: SLINGSHOT

## (undated) DEVICE — GLOVE ORANGE PI 7   MSG9070

## (undated) DEVICE — HIP ACCESS SYSTEM RENTAL KIT

## (undated) DEVICE — T-MAX DISPOSABLE FACE MASK 8 PER BOX

## (undated) DEVICE — DYONICS 25 PATIENT TUBE SET MUST                                    BE USED WITH 7211007, 12 PER BOX

## (undated) DEVICE — GOWN,SIRUS,NON REINFRCD,LARGE,SET IN SL: Brand: MEDLINE

## (undated) DEVICE — DRAPE,SPLIT ,77X120: Brand: MEDLINE

## (undated) DEVICE — PORTAL ENTRY KIT

## (undated) DEVICE — PROBE SERFAS 50 - S SWEEP + XL

## (undated) DEVICE — MAT FLR W28XL40IN STD GRN 60% RECYCL MAT LO ABSRB SGL LAYR

## (undated) DEVICE — POSITIONER ORTHO BOOT LINER PAIR DERMAPROX

## (undated) DEVICE — AMBIENT SUPER TURBOVAC 90: Brand: COBLATION

## (undated) DEVICE — TRANSPORT CANNULA 8MM LENGTH 4, 5, 6: Brand: TRANSPORT

## (undated) DEVICE — SUTURE N ABSRB BRAIDED 2-0 MO-6 39 IN 26 MM 1/2 CIR BLU BLK 3910900023

## (undated) DEVICE — PAD ABSRB W8XL10IN ABD HYDROPHOBIC NONWOVEN THCK LAYR CELOS

## (undated) DEVICE — 6619 2 PTNT ISO SYS INCISE AREA&LT;(&GT;&&LT;)&GT;P: Brand: STERI-DRAPE™ IOBAN™ 2

## (undated) DEVICE — GLOVE ORTHO 7   MSG9470

## (undated) DEVICE — ROOM TURNOVER KIT W/ ARM STRP

## (undated) DEVICE — XL, ARTHROSCOPIC SHAVER BLADES, DIAMOND ROUND BUR.  DO NOT RESTERILIZE, DO NOT USE IF PACKAGE IS DAMAGED, KEEP DRY, KEEP AWAY FROM SUNLIGHT: Brand: CROSSBLADE

## (undated) DEVICE — CODMAN® SURGICAL PATTIES 1/2" X 3" (1.27CM X 7.62CM): Brand: CODMAN®

## (undated) DEVICE — SYRINGE MED 50ML LUERLOCK TIP

## (undated) DEVICE — Device

## (undated) DEVICE — SUTURE N ABSRB BRAIDED 2-0 MO-6 39 IN 26 MM 1/2 CIR WHT BLU 3910900025

## (undated) DEVICE — DRAPE,UTILITY,TAPE,15X26,STERILE: Brand: MEDLINE

## (undated) DEVICE — NANOTACK FLEX DRILL BIT: Brand: NANOTACK FLEX

## (undated) DEVICE — GOWN SIRUS NONREIN XL W/TWL: Brand: MEDLINE INDUSTRIES, INC.

## (undated) DEVICE — COVER,TABLE,77X90,STERILE: Brand: MEDLINE

## (undated) DEVICE — SHOULDER STABILIZATION KIT,                                    DISPOSABLE 12 PER BOX

## (undated) DEVICE — DRAPE SURG UTIL 26X15 IN W/ TAPE N INVASIVE MULT LAYR DISP

## (undated) DEVICE — FLEXIBLE ADHESIVE BANDAGE: Brand: CURITY

## (undated) DEVICE — MAJOR SET UP PK

## (undated) DEVICE — POSITIONER ORTHO HIP KIT POSTLESS PNK

## (undated) DEVICE — SUTURE NONABSORBABLE MONOFILAMENT 4-0 PS-2 18 IN BLK ETHILON 1667G

## (undated) DEVICE — SUTURE N ABSRB BRAIDED 2-0 CT 39 IN 40 MM 1/2 CIR WHT BLK 3910900026

## (undated) DEVICE — 1.8MM Q-FIX DISPOSABLE FLEXIBLE DRILL: Brand: Q-FIX

## (undated) DEVICE — GLOVE ORANGE PI 8 1/2   MSG9085

## (undated) DEVICE — SUTURE NONABSORBABLE MONOFILAMENT 3-0 PS-1 18 IN BLK ETHILON 1663H

## (undated) DEVICE — BLANKET WRM W29.9XL79.1IN UP BODY FORC AIR MISTRAL-AIR

## (undated) DEVICE — GLOVE SURG SZ 6 L12IN FNGR THK79MIL GRN LTX FREE

## (undated) DEVICE — MASC TURNOVER KIT: Brand: MEDLINE INDUSTRIES, INC.